# Patient Record
Sex: FEMALE | Race: OTHER | HISPANIC OR LATINO | Employment: FULL TIME | URBAN - METROPOLITAN AREA
[De-identification: names, ages, dates, MRNs, and addresses within clinical notes are randomized per-mention and may not be internally consistent; named-entity substitution may affect disease eponyms.]

---

## 2019-04-01 ENCOUNTER — APPOINTMENT (EMERGENCY)
Dept: RADIOLOGY | Facility: HOSPITAL | Age: 64
End: 2019-04-01
Payer: COMMERCIAL

## 2019-04-01 ENCOUNTER — HOSPITAL ENCOUNTER (EMERGENCY)
Facility: HOSPITAL | Age: 64
Discharge: HOME | End: 2019-04-01
Attending: EMERGENCY MEDICINE | Admitting: EMERGENCY MEDICINE
Payer: COMMERCIAL

## 2019-04-01 VITALS
TEMPERATURE: 100 F | WEIGHT: 178.57 LBS | HEIGHT: 60 IN | OXYGEN SATURATION: 96 % | BODY MASS INDEX: 35.06 KG/M2 | DIASTOLIC BLOOD PRESSURE: 81 MMHG | SYSTOLIC BLOOD PRESSURE: 197 MMHG | RESPIRATION RATE: 16 BRPM | HEART RATE: 100 BPM

## 2019-04-01 DIAGNOSIS — M75.31 CALCIFIC TENDONITIS OF RIGHT SHOULDER: Primary | ICD-10-CM

## 2019-04-01 PROCEDURE — 99283 EMERGENCY DEPT VISIT LOW MDM: CPT | Performed by: PHYSICIAN ASSISTANT

## 2019-04-01 PROCEDURE — 99283 EMERGENCY DEPT VISIT LOW MDM: CPT

## 2019-04-01 PROCEDURE — 93005 ELECTROCARDIOGRAM TRACING: CPT

## 2019-04-01 PROCEDURE — 73030 X-RAY EXAM OF SHOULDER: CPT

## 2019-04-01 PROCEDURE — 96374 THER/PROPH/DIAG INJ IV PUSH: CPT

## 2019-04-01 RX ORDER — NAPROXEN 500 MG/1
500 TABLET ORAL 2 TIMES DAILY WITH MEALS
Qty: 30 TABLET | Refills: 0 | Status: SHIPPED | OUTPATIENT
Start: 2019-04-01

## 2019-04-01 RX ORDER — KETOROLAC TROMETHAMINE 30 MG/ML
15 INJECTION, SOLUTION INTRAMUSCULAR; INTRAVENOUS ONCE
Status: COMPLETED | OUTPATIENT
Start: 2019-04-01 | End: 2019-04-01

## 2019-04-01 RX ADMIN — KETOROLAC TROMETHAMINE 15 MG: 30 INJECTION, SOLUTION INTRAMUSCULAR; INTRAVENOUS at 22:28

## 2019-04-02 LAB
ATRIAL RATE: 96 BPM
P AXIS: 58 DEGREES
PR INTERVAL: 182 MS
QRS AXIS: 41 DEGREES
QRSD INTERVAL: 80 MS
QT INTERVAL: 380 MS
QTC INTERVAL: 480 MS
T WAVE AXIS: 63 DEGREES
VENTRICULAR RATE: 96 BPM

## 2019-04-02 PROCEDURE — 93010 ELECTROCARDIOGRAM REPORT: CPT | Performed by: INTERNAL MEDICINE

## 2019-04-03 ENCOUNTER — OFFICE VISIT (OUTPATIENT)
Dept: OBGYN CLINIC | Facility: CLINIC | Age: 64
End: 2019-04-03
Payer: COMMERCIAL

## 2019-04-03 VITALS
HEIGHT: 60 IN | SYSTOLIC BLOOD PRESSURE: 141 MMHG | WEIGHT: 177 LBS | BODY MASS INDEX: 34.75 KG/M2 | DIASTOLIC BLOOD PRESSURE: 79 MMHG | HEART RATE: 96 BPM

## 2019-04-03 DIAGNOSIS — M75.81 ROTATOR CUFF TENDINITIS, RIGHT: Primary | ICD-10-CM

## 2019-04-03 DIAGNOSIS — M62.838 TRAPEZIUS MUSCLE SPASM: ICD-10-CM

## 2019-04-03 DIAGNOSIS — M75.21 BICEPS TENDINITIS OF RIGHT SHOULDER: ICD-10-CM

## 2019-04-03 PROCEDURE — 99243 OFF/OP CNSLTJ NEW/EST LOW 30: CPT | Performed by: FAMILY MEDICINE

## 2019-04-03 PROCEDURE — 20610 DRAIN/INJ JOINT/BURSA W/O US: CPT | Performed by: FAMILY MEDICINE

## 2019-04-03 RX ORDER — AZELASTINE HYDROCHLORIDE 0.5 MG/ML
1 SOLUTION/ DROPS OPHTHALMIC
COMMUNITY
Start: 2018-01-29

## 2019-04-03 RX ORDER — LANOLIN ALCOHOL/MO/W.PET/CERES
3 CREAM (GRAM) TOPICAL
COMMUNITY

## 2019-04-03 RX ORDER — ROSUVASTATIN CALCIUM 20 MG/1
TABLET, COATED ORAL
COMMUNITY
Start: 2019-02-13

## 2019-04-03 RX ORDER — LIDOCAINE HYDROCHLORIDE 10 MG/ML
2 INJECTION, SOLUTION INFILTRATION; PERINEURAL
Status: COMPLETED | OUTPATIENT
Start: 2019-04-03 | End: 2019-04-03

## 2019-04-03 RX ORDER — VALSARTAN AND HYDROCHLOROTHIAZIDE 160; 25 MG/1; MG/1
TABLET ORAL
COMMUNITY
Start: 2018-04-04

## 2019-04-03 RX ORDER — CYCLOBENZAPRINE HCL 10 MG
TABLET ORAL
COMMUNITY
Start: 2019-03-04

## 2019-04-03 RX ORDER — LIDOCAINE HYDROCHLORIDE 10 MG/ML
4 INJECTION, SOLUTION INFILTRATION; PERINEURAL
Status: COMPLETED | OUTPATIENT
Start: 2019-04-03 | End: 2019-04-03

## 2019-04-03 RX ORDER — TRIAMCINOLONE ACETONIDE 40 MG/ML
40 INJECTION, SUSPENSION INTRA-ARTICULAR; INTRAMUSCULAR
Status: COMPLETED | OUTPATIENT
Start: 2019-04-03 | End: 2019-04-03

## 2019-04-03 RX ADMIN — LIDOCAINE HYDROCHLORIDE 4 ML: 10 INJECTION, SOLUTION INFILTRATION; PERINEURAL at 10:46

## 2019-04-03 RX ADMIN — LIDOCAINE HYDROCHLORIDE 2 ML: 10 INJECTION, SOLUTION INFILTRATION; PERINEURAL at 10:46

## 2019-04-03 RX ADMIN — TRIAMCINOLONE ACETONIDE 40 MG: 40 INJECTION, SUSPENSION INTRA-ARTICULAR; INTRAMUSCULAR at 10:46

## 2023-04-25 ENCOUNTER — OFFICE VISIT (OUTPATIENT)
Dept: URGENT CARE | Facility: CLINIC | Age: 68
End: 2023-04-25

## 2023-04-25 VITALS
OXYGEN SATURATION: 99 % | BODY MASS INDEX: 34.16 KG/M2 | TEMPERATURE: 98 F | HEART RATE: 100 BPM | HEIGHT: 60 IN | RESPIRATION RATE: 18 BRPM | WEIGHT: 174 LBS

## 2023-04-25 DIAGNOSIS — B35.6 TINEA CRURIS: Primary | ICD-10-CM

## 2023-04-25 DIAGNOSIS — B37.9 CANDIDA ALBICANS INFECTION: ICD-10-CM

## 2023-04-25 DIAGNOSIS — R82.90 MALODOROUS URINE: ICD-10-CM

## 2023-04-25 PROBLEM — M54.2 NECK PAIN: Status: ACTIVE | Noted: 2017-02-01

## 2023-04-25 PROBLEM — I10 HYPERTENSION GOAL BP (BLOOD PRESSURE) < 130/80: Status: ACTIVE | Noted: 2022-02-14

## 2023-04-25 PROBLEM — I10 BENIGN ESSENTIAL HYPERTENSION: Status: ACTIVE | Noted: 2018-06-15

## 2023-04-25 PROBLEM — Z79.4 LONG TERM CURRENT USE OF INSULIN (HCC): Status: ACTIVE | Noted: 2018-06-15

## 2023-04-25 PROBLEM — M48.10 ANKYLOSING HYPEROSTOSIS: Status: ACTIVE | Noted: 2018-06-15

## 2023-04-25 PROBLEM — Z59.41 FOOD INSECURITY: Status: ACTIVE | Noted: 2021-08-09

## 2023-04-25 PROBLEM — M10.9 GOUT: Status: ACTIVE | Noted: 2018-06-15

## 2023-04-25 PROBLEM — M25.519 SHOULDER PAIN: Status: ACTIVE | Noted: 2017-01-19

## 2023-04-25 PROBLEM — M75.40 IMPINGEMENT SYNDROME OF SHOULDER REGION: Status: ACTIVE | Noted: 2017-02-01

## 2023-04-25 PROBLEM — F33.1 MODERATE EPISODE OF RECURRENT MAJOR DEPRESSIVE DISORDER (HCC): Status: ACTIVE | Noted: 2023-02-17

## 2023-04-25 PROBLEM — E78.2 MIXED HYPERLIPIDEMIA: Status: ACTIVE | Noted: 2020-10-12

## 2023-04-25 PROBLEM — F32.1 CURRENT MODERATE EPISODE OF MAJOR DEPRESSIVE DISORDER (HCC): Status: ACTIVE | Noted: 2021-05-25

## 2023-04-25 LAB
SL AMB  POCT GLUCOSE, UA: 2000
SL AMB LEUKOCYTE ESTERASE,UA: NEGATIVE
SL AMB POCT BILIRUBIN,UA: NEGATIVE
SL AMB POCT BLOOD,UA: NEGATIVE
SL AMB POCT CLARITY,UA: NORMAL
SL AMB POCT COLOR,UA: YELLOW
SL AMB POCT KETONES,UA: 15
SL AMB POCT NITRITE,UA: POSITIVE
SL AMB POCT PH,UA: 5
SL AMB POCT SPECIFIC GRAVITY,UA: 1.01
SL AMB POCT URINE PROTEIN: NEGATIVE
SL AMB POCT UROBILINOGEN: 1

## 2023-04-25 RX ORDER — FLUCONAZOLE 150 MG/1
TABLET ORAL
COMMUNITY
Start: 2023-02-27 | End: 2023-04-25 | Stop reason: ALTCHOICE

## 2023-04-25 RX ORDER — MONTELUKAST SODIUM 10 MG/1
10 TABLET ORAL DAILY
COMMUNITY
Start: 2023-02-17

## 2023-04-25 RX ORDER — FLUTICASONE PROPIONATE 50 MCG
2 SPRAY, SUSPENSION (ML) NASAL DAILY
COMMUNITY
Start: 2023-02-17

## 2023-04-25 RX ORDER — ESCITALOPRAM OXALATE 20 MG/1
1 TABLET ORAL DAILY
COMMUNITY
Start: 2023-04-24

## 2023-04-25 RX ORDER — LOTEPREDNOL ETABONATE 5 MG/ML
SUSPENSION/ DROPS OPHTHALMIC
COMMUNITY
Start: 2023-02-22

## 2023-04-25 RX ORDER — ATORVASTATIN CALCIUM 40 MG/1
40 TABLET, FILM COATED ORAL DAILY
COMMUNITY
Start: 2023-02-17

## 2023-04-25 RX ORDER — NEOMYCIN SULFATE, POLYMYXIN B SULFATE AND DEXAMETHASONE 3.5; 10000; 1 MG/ML; [USP'U]/ML; MG/ML
SUSPENSION/ DROPS OPHTHALMIC
COMMUNITY
Start: 2023-01-30

## 2023-04-25 RX ORDER — ALBUTEROL SULFATE 90 UG/1
2 AEROSOL, METERED RESPIRATORY (INHALATION) 4 TIMES DAILY
COMMUNITY
Start: 2023-04-10

## 2023-04-25 RX ORDER — LIFITEGRAST 50 MG/ML
SOLUTION/ DROPS OPHTHALMIC
COMMUNITY
Start: 2023-02-13

## 2023-04-25 RX ORDER — FLUCONAZOLE 150 MG/1
150 TABLET ORAL ONCE
Qty: 1 TABLET | Refills: 0 | Status: SHIPPED | OUTPATIENT
Start: 2023-04-25 | End: 2023-04-25

## 2023-04-25 RX ORDER — EMPAGLIFLOZIN AND METFORMIN HYDROCHLORIDE 5; 500 MG/1; MG/1
1 TABLET ORAL 2 TIMES DAILY
COMMUNITY
Start: 2022-11-07

## 2023-04-25 RX ORDER — ESTRADIOL 0.1 MG/G
2 CREAM VAGINAL DAILY
COMMUNITY
Start: 2023-03-24 | End: 2024-03-23

## 2023-04-25 RX ORDER — NYSTATIN 100000 [USP'U]/G
POWDER TOPICAL 2 TIMES DAILY
Qty: 30 G | Refills: 0 | Status: SHIPPED | OUTPATIENT
Start: 2023-04-25

## 2023-04-25 NOTE — PROGRESS NOTES
[address] [facility]        NAME: Emily Kirk is a 79 y o  female  : 1955    MRN: [id number]  DATE: 2023  TIME: 8:13 AM    Assessment and Plan   Tinea cruris [B35 6]  1  Tinea cruris  nystatin (MYCOSTATIN) powder    nystatin-triamcinolone (MYCOLOG-II) cream      2  Malodorous urine  POCT urine dip    Urine culture      3  Candida albicans infection  fluconazole (DIFLUCAN) 150 mg tablet        POC urine negative for UTI, but large amounts of glucose in urine  Patient does have a prior history of diabetes, managed by her PCP  Will send urine culture and start on Diflucan for presumed yeast infection  Will follow-up with urine culture results if positive  Patient Instructions     Apply cream under breasts and powder between groin folds up to twice daily  Take diflucan once for symptoms of yeast infection, will follow-up with urine culture if results abnormal  Follow-up with PCP in 3-5 days if no improvement of symptoms  Report to ER if symptoms worsen  Chief Complaint     Chief Complaint   Patient presents with   • Rash     4 days rash on chest and privatre area and a severe headache  History of Present Illness       79year old female presents for evaluation of rash under bilateral breasts and between groin folds for the past 3 days  Reports recently changing her detergent and noticing the rash develop shortly after  She denies prior allergies to soaps or detergents  She also reports developing malodorous urine, burning with urination, and a white discharge in the past few days  Denies risk for STDs, she is post-menopausal  She has not yet taken anything for symptoms  Rash  This is a new problem  The current episode started in the past 7 days  The problem is unchanged  The affected locations include the chest and groin  The problem is mild  The rash is characterized by dryness, itchiness and redness  It is unknown if there was an exposure to a precipitant   The rash first occurred at home  Associated symptoms include joint pain  Pertinent negatives include no anorexia, congestion, cough, decreased physical activity, decreased responsiveness, decreased sleep, drinking less, diarrhea, facial edema, fatigue, fever, itching, rhinorrhea, shortness of breath, sore throat or vomiting  Past treatments include nothing  The treatment provided no relief  There is no history of allergies, asthma, eczema or varicella  There were no sick contacts  Review of Systems   Review of Systems   Constitutional: Negative for activity change, appetite change, chills, decreased responsiveness, fatigue and fever  HENT: Negative for congestion, rhinorrhea and sore throat  Respiratory: Negative for cough and shortness of breath  Gastrointestinal: Negative for abdominal pain, anorexia, constipation, diarrhea, nausea and vomiting  Genitourinary: Positive for dysuria and vaginal discharge  Negative for decreased urine volume, urgency, vaginal bleeding and vaginal pain  Musculoskeletal: Positive for joint pain  Skin: Positive for color change and rash  Negative for itching, pallor and wound  Allergic/Immunologic: Negative for environmental allergies and food allergies  Neurological: Negative for dizziness, light-headedness, numbness and headaches           Current Medications       Current Outpatient Medications:   •  cyanocobalamin (VITAMIN B-12) 1000 MCG tablet, TAKE 1 TABLET BY MOUTH EVERY DAY, Disp: , Rfl:   •  Empagliflozin-metFORMIN HCl (Synjardy) 5-500 MG TABS, Take 1 tablet by mouth 2 (two) times a day, Disp: , Rfl:   •  lifitegrast (Xiidra) 5 % op solution, INSTILL 1 DROP INTO BOTH EYES TWICE A DAY, Disp: , Rfl:   •  melatonin 3 mg, Take 3 mg by mouth, Disp: , Rfl:   •  montelukast (SINGULAIR) 10 mg tablet, Take 10 mg by mouth daily, Disp: , Rfl:   •  naproxen (NAPROSYN) 500 mg tablet, Take 1 tablet (500 mg total) by mouth 2 (two) times a day with meals, Disp: 30 tablet, Rfl: 0  • nystatin (MYCOSTATIN) powder, Apply topically 2 (two) times a day, Disp: 30 g, Rfl: 0  •  nystatin-triamcinolone (MYCOLOG-II) cream, Apply topically 2 (two) times a day, Disp: 30 g, Rfl: 0  •  semaglutide, 1 mg/dose, (Ozempic) 4 mg/3 mL injection pen, Inject 1 mg under the skin, Disp: , Rfl:   •  albuterol (PROVENTIL HFA,VENTOLIN HFA) 90 mcg/act inhaler, Inhale 2 puffs 4 (four) times a day (Patient not taking: Reported on 4/25/2023), Disp: , Rfl:   •  atorvastatin (LIPITOR) 40 mg tablet, Take 40 mg by mouth daily (Patient not taking: Reported on 4/25/2023), Disp: , Rfl:   •  azelastine (OPTIVAR) 0 05 % ophthalmic solution, Apply 1 drop to eye (Patient not taking: Reported on 4/25/2023), Disp: , Rfl:   •  cyclobenzaprine (FLEXERIL) 10 mg tablet, TAKE 1 TABLET BY MOUTH AT BEDTIME AS NEEDED FOR MUSCLE SPASM (Patient not taking: Reported on 4/25/2023), Disp: , Rfl:   •  escitalopram (LEXAPRO) 20 mg tablet, Take 1 tablet by mouth daily (Patient not taking: Reported on 4/25/2023), Disp: , Rfl:   •  estradiol (ESTRACE) 0 1 mg/g vaginal cream, Insert 2 g into the vagina daily (Patient not taking: Reported on 4/25/2023), Disp: , Rfl:   •  fluticasone (FLONASE) 50 mcg/act nasal spray, 2 sprays into each nostril daily (Patient not taking: Reported on 4/25/2023), Disp: , Rfl:   •  glyBURIDE (DIABETA PO), glyburide (Patient not taking: Reported on 4/25/2023), Disp: , Rfl:   •  loteprednol etabonate (LOTEMAX) 0 5 % ophthalmic suspension, INSTILL 1 DROP INTO BOTH EYES 4 TIMES A DAY FOR 1 WEEK THEN TWICE A DAY FOR 1 WEEK (Patient not taking: Reported on 4/25/2023), Disp: , Rfl:   •  neomycin-polymyxin-dexamethasone (MAXITROL) ophthalmic suspension, INSTILL 1 DROP INTO BOTH EYES 4 TIMES A DAY FOR 1 WEEK (Patient not taking: Reported on 4/25/2023), Disp: , Rfl:   •  rosuvastatin (CRESTOR) 20 MG tablet, TAKE 1 TABLET (20 MG) BY MOUTH DAILY (Patient not taking: Reported on 4/25/2023), Disp: , Rfl:   •  valsartan-hydrochlorothiazide (DIOVAN-HCT) 160-25 MG per tablet, TAKE 1 TAB BY MOUTH DAILY  (Patient not taking: Reported on 2023), Disp: , Rfl:     Current Allergies     Allergies as of 2023   • (No Known Allergies)            The following portions of the patient's history were reviewed and updated as appropriate: allergies, current medications, past family history, past medical history, past social history, past surgical history and problem list      Past Medical History:   Diagnosis Date   • Diabetes mellitus ([facility] [address] )    • Hyperlipidemia    • Hypertension        Past Surgical History:   Procedure Laterality Date   •  SECTION     • HYSTERECTOMY     • TONSILLECTOMY         History reviewed  No pertinent family history  Medications have been verified  Objective   Pulse 100   Temp 98 °F (36 7 °C)   Resp 18   Ht 5' (1 524 m)   Wt 78 9 kg (174 lb)   SpO2 99%   BMI 33 98 kg/m²        Physical Exam     Physical Exam  Vitals and nursing note reviewed  Constitutional:       General: She is awake  Appearance: Normal appearance  She is overweight  HENT:      Head: Normocephalic and atraumatic  Cardiovascular:      Rate and Rhythm: Regular rhythm  Tachycardia present  Pulses: Normal pulses  Heart sounds: Normal heart sounds  Pulmonary:      Effort: Pulmonary effort is normal       Breath sounds: Normal breath sounds  Skin:     General: Skin is warm and dry  Findings: Erythema and rash present  No abrasion, abscess, acne, bruising, burn, ecchymosis, signs of injury, laceration, lesion, petechiae or wound  Rash is macular  Rash is not crusting, nodular, papular, purpuric, pustular, scaling, urticarial or vesicular  Neurological:      Mental Status: She is alert and oriented to person, place, and time  Psychiatric:         Mood and Affect: Mood normal          Behavior: Behavior normal  Behavior is cooperative  Thought Content:  Thought content normal          Judgment: Judgment normal

## 2023-04-25 NOTE — PATIENT INSTRUCTIONS
Apply cream under breasts and powder between groin folds up to twice daily  Take diflucan once for symptoms of yeast infection, will follow-up with urine culture if results abnormal  Follow-up with PCP in 3-5 days if no improvement of symptoms  Report to ER if symptoms worsen

## 2023-04-27 ENCOUNTER — TELEPHONE (OUTPATIENT)
Age: 68
End: 2023-04-27

## 2023-04-27 DIAGNOSIS — N30.00 ACUTE CYSTITIS WITHOUT HEMATURIA: Primary | ICD-10-CM

## 2023-04-27 LAB — BACTERIA UR CULT: ABNORMAL

## 2023-04-27 RX ORDER — CEPHALEXIN 500 MG/1
500 CAPSULE ORAL EVERY 8 HOURS SCHEDULED
Qty: 21 CAPSULE | Refills: 0 | Status: SHIPPED | OUTPATIENT
Start: 2023-04-27 | End: 2023-05-04

## 2023-12-08 ENCOUNTER — HOSPITAL ENCOUNTER (OUTPATIENT)
Facility: HOSPITAL | Age: 68
Setting detail: OBSERVATION
Discharge: HOME | End: 2023-12-09
Attending: EMERGENCY MEDICINE | Admitting: INTERNAL MEDICINE
Payer: COMMERCIAL

## 2023-12-08 ENCOUNTER — APPOINTMENT (OUTPATIENT)
Dept: NON INVASIVE DIAGNOSTICS | Facility: HOSPITAL | Age: 68
End: 2023-12-08
Payer: COMMERCIAL

## 2023-12-08 ENCOUNTER — APPOINTMENT (EMERGENCY)
Dept: CT IMAGING | Facility: HOSPITAL | Age: 68
End: 2023-12-08
Payer: COMMERCIAL

## 2023-12-08 ENCOUNTER — APPOINTMENT (OUTPATIENT)
Dept: MRI IMAGING | Facility: HOSPITAL | Age: 68
End: 2023-12-08
Payer: COMMERCIAL

## 2023-12-08 DIAGNOSIS — R20.0 NUMBNESS: Primary | ICD-10-CM

## 2023-12-08 DIAGNOSIS — E78.2 MIXED HYPERLIPIDEMIA: ICD-10-CM

## 2023-12-08 DIAGNOSIS — I10 HYPERTENSION GOAL BP (BLOOD PRESSURE) < 130/80: ICD-10-CM

## 2023-12-08 DIAGNOSIS — E11.36 TYPE 2 DIABETES MELLITUS WITH DIABETIC CATARACT (HCC): ICD-10-CM

## 2023-12-08 DIAGNOSIS — I65.23 CAROTID STENOSIS, BILATERAL: ICD-10-CM

## 2023-12-08 DIAGNOSIS — M10.9 GOUT: ICD-10-CM

## 2023-12-08 PROBLEM — R29.90 STROKE-LIKE SYMPTOMS: Status: ACTIVE | Noted: 2023-12-08

## 2023-12-08 LAB
2HR DELTA HS TROPONIN: -1 NG/L
4HR DELTA HS TROPONIN: -4 NG/L
ANION GAP SERPL CALCULATED.3IONS-SCNC: 9 MMOL/L
AORTIC ROOT: 2.2 CM
APICAL FOUR CHAMBER EJECTION FRACTION: 50 %
APTT PPP: 26 SECONDS (ref 23–37)
ASCENDING AORTA: 1.9 CM
ATRIAL RATE: 85 BPM
BUN SERPL-MCNC: 11 MG/DL (ref 5–25)
CALCIUM SERPL-MCNC: 9.4 MG/DL (ref 8.4–10.2)
CARDIAC TROPONIN I PNL SERPL HS: 4 NG/L
CARDIAC TROPONIN I PNL SERPL HS: 7 NG/L
CARDIAC TROPONIN I PNL SERPL HS: 8 NG/L
CHLORIDE SERPL-SCNC: 104 MMOL/L (ref 96–108)
CO2 SERPL-SCNC: 26 MMOL/L (ref 21–32)
CREAT SERPL-MCNC: 0.83 MG/DL (ref 0.6–1.3)
E WAVE DECELERATION TIME: 197 MS
E/A RATIO: 0.62
ERYTHROCYTE [DISTWIDTH] IN BLOOD BY AUTOMATED COUNT: 14.1 % (ref 11.6–15.1)
FLUAV RNA RESP QL NAA+PROBE: NEGATIVE
FLUBV RNA RESP QL NAA+PROBE: NEGATIVE
FRACTIONAL SHORTENING: 27 (ref 28–44)
GFR SERPL CREATININE-BSD FRML MDRD: 72 ML/MIN/1.73SQ M
GLUCOSE SERPL-MCNC: 127 MG/DL (ref 65–140)
GLUCOSE SERPL-MCNC: 161 MG/DL (ref 65–140)
GLUCOSE SERPL-MCNC: 178 MG/DL (ref 65–140)
GLUCOSE SERPL-MCNC: 184 MG/DL (ref 65–140)
HCT VFR BLD AUTO: 45.8 % (ref 34.8–46.1)
HGB BLD-MCNC: 14 G/DL (ref 11.5–15.4)
INR PPP: 0.96 (ref 0.84–1.19)
INTERVENTRICULAR SEPTUM IN DIASTOLE (PARASTERNAL SHORT AXIS VIEW): 0.9 CM
INTERVENTRICULAR SEPTUM: 0.9 CM (ref 0.6–1.1)
LA/AORTA RATIO 2D: 1.41
LAAS-AP2: 13.5 CM2
LAAS-AP4: 17.9 CM2
LEFT ATRIUM SIZE: 3.1 CM
LEFT ATRIUM VOLUME (MOD BIPLANE): 38 ML
LEFT ATRIUM VOLUME INDEX (MOD BIPLANE): 21.6 ML/M2
LEFT INTERNAL DIMENSION IN SYSTOLE: 3.3 CM (ref 2.1–4)
LEFT VENTRICULAR INTERNAL DIMENSION IN DIASTOLE: 4.5 CM (ref 3.5–6)
LEFT VENTRICULAR POSTERIOR WALL IN END DIASTOLE: 0.9 CM
LEFT VENTRICULAR STROKE VOLUME: 46 ML
LVSV (TEICH): 46 ML
MCH RBC QN AUTO: 25.4 PG (ref 26.8–34.3)
MCHC RBC AUTO-ENTMCNC: 30.6 G/DL (ref 31.4–37.4)
MCV RBC AUTO: 83 FL (ref 82–98)
MV E'TISSUE VEL-SEP: 8 CM/S
MV PEAK A VEL: 1.38 M/S
MV PEAK E VEL: 85 CM/S
MV STENOSIS PRESSURE HALF TIME: 58 MS
MV VALVE AREA P 1/2 METHOD: 3.79
P AXIS: 57 DEGREES
PLATELET # BLD AUTO: 353 THOUSANDS/UL (ref 149–390)
PMV BLD AUTO: 9.8 FL (ref 8.9–12.7)
POTASSIUM SERPL-SCNC: 4.3 MMOL/L (ref 3.5–5.3)
PR INTERVAL: 190 MS
PROTHROMBIN TIME: 13.3 SECONDS (ref 11.6–14.5)
QRS AXIS: 102 DEGREES
QRSD INTERVAL: 76 MS
QT INTERVAL: 388 MS
QTC INTERVAL: 461 MS
RBC # BLD AUTO: 5.52 MILLION/UL (ref 3.81–5.12)
RIGHT VENTRICLE ID DIMENSION: 3.3 CM
RSV RNA RESP QL NAA+PROBE: NEGATIVE
SARS-COV-2 RNA RESP QL NAA+PROBE: NEGATIVE
SL CV LV EF: 45
SL CV PED ECHO LEFT VENTRICLE DIASTOLIC VOLUME (MOD BIPLANE) 2D: 91 ML
SL CV PED ECHO LEFT VENTRICLE SYSTOLIC VOLUME (MOD BIPLANE) 2D: 45 ML
SODIUM SERPL-SCNC: 139 MMOL/L (ref 135–147)
T WAVE AXIS: 57 DEGREES
TRICUSPID ANNULAR PLANE SYSTOLIC EXCURSION: 2 CM
VENTRICULAR RATE: 85 BPM
WBC # BLD AUTO: 6.82 THOUSAND/UL (ref 4.31–10.16)

## 2023-12-08 PROCEDURE — 70496 CT ANGIOGRAPHY HEAD: CPT

## 2023-12-08 PROCEDURE — 99285 EMERGENCY DEPT VISIT HI MDM: CPT

## 2023-12-08 PROCEDURE — 82948 REAGENT STRIP/BLOOD GLUCOSE: CPT

## 2023-12-08 PROCEDURE — 85027 COMPLETE CBC AUTOMATED: CPT | Performed by: NURSE PRACTITIONER

## 2023-12-08 PROCEDURE — 85610 PROTHROMBIN TIME: CPT | Performed by: NURSE PRACTITIONER

## 2023-12-08 PROCEDURE — 93306 TTE W/DOPPLER COMPLETE: CPT | Performed by: INTERNAL MEDICINE

## 2023-12-08 PROCEDURE — 84484 ASSAY OF TROPONIN QUANT: CPT | Performed by: NURSE PRACTITIONER

## 2023-12-08 PROCEDURE — 36415 COLL VENOUS BLD VENIPUNCTURE: CPT | Performed by: NURSE PRACTITIONER

## 2023-12-08 PROCEDURE — 70498 CT ANGIOGRAPHY NECK: CPT

## 2023-12-08 PROCEDURE — C8929 TTE W OR WO FOL WCON,DOPPLER: HCPCS

## 2023-12-08 PROCEDURE — 80048 BASIC METABOLIC PNL TOTAL CA: CPT | Performed by: NURSE PRACTITIONER

## 2023-12-08 PROCEDURE — 0241U HB NFCT DS VIR RESP RNA 4 TRGT: CPT | Performed by: NURSE PRACTITIONER

## 2023-12-08 PROCEDURE — 99291 CRITICAL CARE FIRST HOUR: CPT | Performed by: STUDENT IN AN ORGANIZED HEALTH CARE EDUCATION/TRAINING PROGRAM

## 2023-12-08 PROCEDURE — 84484 ASSAY OF TROPONIN QUANT: CPT | Performed by: INTERNAL MEDICINE

## 2023-12-08 PROCEDURE — 99223 1ST HOSP IP/OBS HIGH 75: CPT | Performed by: INTERNAL MEDICINE

## 2023-12-08 PROCEDURE — 93005 ELECTROCARDIOGRAM TRACING: CPT

## 2023-12-08 PROCEDURE — 85730 THROMBOPLASTIN TIME PARTIAL: CPT | Performed by: NURSE PRACTITIONER

## 2023-12-08 PROCEDURE — 99285 EMERGENCY DEPT VISIT HI MDM: CPT | Performed by: NURSE PRACTITIONER

## 2023-12-08 RX ORDER — HYDROXYZINE HYDROCHLORIDE 25 MG/1
50 TABLET, FILM COATED ORAL
Status: DISCONTINUED | OUTPATIENT
Start: 2023-12-08 | End: 2023-12-09 | Stop reason: HOSPADM

## 2023-12-08 RX ORDER — INSULIN LISPRO 100 [IU]/ML
1-5 INJECTION, SOLUTION INTRAVENOUS; SUBCUTANEOUS
Status: DISCONTINUED | OUTPATIENT
Start: 2023-12-08 | End: 2023-12-09 | Stop reason: HOSPADM

## 2023-12-08 RX ORDER — MAGNESIUM HYDROXIDE/ALUMINUM HYDROXICE/SIMETHICONE 120; 1200; 1200 MG/30ML; MG/30ML; MG/30ML
30 SUSPENSION ORAL EVERY 6 HOURS PRN
Status: DISCONTINUED | OUTPATIENT
Start: 2023-12-08 | End: 2023-12-09 | Stop reason: HOSPADM

## 2023-12-08 RX ORDER — ASPIRIN 81 MG/1
81 TABLET, CHEWABLE ORAL DAILY
Status: DISCONTINUED | OUTPATIENT
Start: 2023-12-09 | End: 2023-12-09 | Stop reason: HOSPADM

## 2023-12-08 RX ORDER — ATORVASTATIN CALCIUM 40 MG/1
40 TABLET, FILM COATED ORAL EVERY EVENING
Status: DISCONTINUED | OUTPATIENT
Start: 2023-12-08 | End: 2023-12-09 | Stop reason: HOSPADM

## 2023-12-08 RX ORDER — ONDANSETRON 2 MG/ML
4 INJECTION INTRAMUSCULAR; INTRAVENOUS EVERY 6 HOURS PRN
Status: DISCONTINUED | OUTPATIENT
Start: 2023-12-08 | End: 2023-12-09 | Stop reason: HOSPADM

## 2023-12-08 RX ORDER — ENOXAPARIN SODIUM 100 MG/ML
40 INJECTION SUBCUTANEOUS DAILY
Status: DISCONTINUED | OUTPATIENT
Start: 2023-12-08 | End: 2023-12-09 | Stop reason: HOSPADM

## 2023-12-08 RX ORDER — CLOPIDOGREL BISULFATE 75 MG/1
75 TABLET ORAL DAILY
Status: DISCONTINUED | OUTPATIENT
Start: 2023-12-09 | End: 2023-12-09

## 2023-12-08 RX ORDER — ACETAMINOPHEN 325 MG/1
650 TABLET ORAL EVERY 6 HOURS PRN
Status: DISCONTINUED | OUTPATIENT
Start: 2023-12-08 | End: 2023-12-09 | Stop reason: HOSPADM

## 2023-12-08 RX ORDER — CLOPIDOGREL BISULFATE 75 MG/1
300 TABLET ORAL ONCE
Status: COMPLETED | OUTPATIENT
Start: 2023-12-08 | End: 2023-12-08

## 2023-12-08 RX ORDER — ASPIRIN 325 MG
325 TABLET ORAL ONCE
Status: COMPLETED | OUTPATIENT
Start: 2023-12-08 | End: 2023-12-08

## 2023-12-08 RX ORDER — HYDROXYZINE 50 MG/1
50 TABLET, FILM COATED ORAL
COMMUNITY
Start: 2023-10-20

## 2023-12-08 RX ADMIN — ENOXAPARIN SODIUM 40 MG: 40 INJECTION SUBCUTANEOUS at 13:26

## 2023-12-08 RX ADMIN — CLOPIDOGREL 300 MG: 75 TABLET ORAL at 10:57

## 2023-12-08 RX ADMIN — INSULIN LISPRO 1 UNITS: 100 INJECTION, SOLUTION INTRAVENOUS; SUBCUTANEOUS at 22:35

## 2023-12-08 RX ADMIN — PERFLUTREN 2 ML/MIN: 6.52 INJECTION, SUSPENSION INTRAVENOUS at 15:53

## 2023-12-08 RX ADMIN — ASPIRIN 325 MG ORAL TABLET 325 MG: 325 PILL ORAL at 10:57

## 2023-12-08 RX ADMIN — IOHEXOL 85 ML: 350 INJECTION, SOLUTION INTRAVENOUS at 09:59

## 2023-12-08 RX ADMIN — ATORVASTATIN CALCIUM 40 MG: 40 TABLET, FILM COATED ORAL at 17:23

## 2023-12-08 RX ADMIN — HYDROXYZINE HYDROCHLORIDE 50 MG: 25 TABLET, FILM COATED ORAL at 22:36

## 2023-12-08 RX ADMIN — CYANOCOBALAMIN TAB 500 MCG 1000 MCG: 500 TAB at 13:26

## 2023-12-08 NOTE — ED NOTES
Waiting a bed assignment pt aware none yet   Caro Porter, RN  12/08/23 [address]  12/08/23 [id number]

## 2023-12-08 NOTE — ASSESSMENT & PLAN NOTE
This is a 71-year-old female with significant history of hypertension, diabetes and dyslipidemia came in with significant history of pins and needle sensation on the left side of the face when she got up this morning around 8 AM.  The numbness in her legs and left hand seem better however she still continues to have mild numbness in the left upper part of her hand and on the left side of the face  Initially had some slurring of speech for about a minute but this resolved totally  No significant motor weakness according to the patient  Hospitalized in observation status with NIH stroke scale of 1  CT head and CT angiogram are negative for any acute infarct  Stroke pathway initiated and the patient is on dual antiplatelet therapy for 21 days, with plan for monotherapy after that and high intensity statins

## 2023-12-08 NOTE — ASSESSMENT & PLAN NOTE
Lab Results   Component Value Date    HGBA1C 7.2 (H) 02/13/2023       Recent Labs     12/08/23  0944   POCGLU 178*       Blood Sugar Average: Last 72 hrs:  (P) 178    Hold oral medications and GLP-1 analog injections, will give insulin sliding scale while hospitalized

## 2023-12-08 NOTE — ASSESSMENT & PLAN NOTE
Patient was on valsartan hydrochlorothiazide at home  Given concern for acute stroke, permissive hypertension being allowed and will hold off oral antihypertensive medications at this point of time  However if the symptoms resolve and the MRI is negative, will need to reinitiate antihypertensive medications on 12/9/2023    Blood pressure (!) 177/93, pulse 99, temperature 98.4 °F (36.9 °C), temperature source Oral, resp. rate 20, SpO2 98 %.

## 2023-12-08 NOTE — H&P
[address]  H&P  Name: Chirag Doyle 76 y.o. female I MRN: [id number]  Unit/Bed#: ED 29 I Date of Admission: 12/8/2023   Date of Service: 12/8/2023 I Hospital Day: 0      Assessment/Plan   * Stroke-like symptoms  Assessment & Plan  This is a 61-year-old female with significant history of hypertension, diabetes and dyslipidemia came in with significant history of pins and needle sensation on the left side of the face when she got up this morning around 8 AM.  The numbness in her legs and left hand seem better however she still continues to have mild numbness in the left upper part of her hand and on the left side of the face  Initially had some slurring of speech for about a minute but this resolved totally  No significant motor weakness according to the patient  Hospitalized in observation status with NIH stroke scale of 1  CT head and CT angiogram are negative for any acute infarct  Stroke pathway initiated and the patient is on dual antiplatelet therapy for 21 days, with plan for monotherapy after that and high intensity statins    Benign essential hypertension  Assessment & Plan  Patient was on valsartan hydrochlorothiazide at home  Given concern for acute stroke, permissive hypertension being allowed and will hold off oral antihypertensive medications at this point of time  However if the symptoms resolve and the MRI is negative, will need to reinitiate antihypertensive medications on 12/9/2023    Blood pressure (!) 177/93, pulse 99, temperature 98.4 °F (36.9 °C), temperature source Oral, resp. rate 20, SpO2 98 %.       Type 2 diabetes mellitus with diabetic cataract [facility])  Assessment & Plan  Lab Results   Component Value Date    HGBA1C 7.2 (H) 02/13/2023       Recent Labs     12/08/23  0944   POCGLU 178*       Blood Sugar Average: Last 72 hrs:  (P) 178    Hold oral medications and GLP-1 analog injections, will give insulin sliding scale while hospitalized    Gout  Assessment & Plan  Patient does have a history of gout and arthritis but patient states that she is on no medications for the same and she feels quite okay  Will give Tylenol as needed             VTE Prophylaxis: Enoxaparin (Lovenox)  Code Status: Level 1 - Full Code  Anticipated Length of Stay:  Patient will be admitted on an Observation basis with an anticipated length of stay of  less than 2 midnights. Justification for Hospital Stay: Stroke-like symptoms  Total Time for Visit, including Counseling / Coordination of Care: 75 mins. Greater than 50% of this total time spent on direct patient counseling and coordination of care. Chief Complaint:     Chief Complaint   Patient presents with    Numbness     Pt c/o pins and needles feeling on her left arm which started this morning, pt also c/o lightheadedness      History of Present Illness:    Hardeep Michelle is a 76 y.o. female who presents with with left-sided numbness. Patient states that she got up from sleep at 8 AM this morning and the left side of her face left hand and left leg felt numb. She has past medical history of hypertension, diabetes and dyslipidemia and has not been taking her medications properly. Patient states that over time the numbness in the leg got better and the hands decreased but is still present but the face numbness persists. She did have slurring of speech for 1 minute. She had no motor deficits. Patient has history of noncompliance with her medication regimen. Patient does not have any chest pain, palpitations or diaphoresis.   No fever or chills at this point of time    Review of Systems:  Negative except as above  History obtained from the patient  General ROS: positive for  - fatigue  Psychological ROS: negative  Ophthalmic ROS: negative  Respiratory ROS: no cough, shortness of breath, or wheezing  Cardiovascular ROS: no chest pain or dyspnea on exertion  Gastrointestinal ROS: no abdominal pain, change in bowel habits, or black or bloody stools  Genito-Urinary ROS: no dysuria, trouble voiding, or hematuria  Musculoskeletal ROS: negative  Neurological ROS: positive for -left hemisensory symptoms  Hematological ROS- No active bleeding  Otherwise, all other 12 point review of systems normal.        Past Medical and Surgical History:   Past Medical History:   Diagnosis Date    Diabetes mellitus ([address])     Hyperlipidemia     Hypertension      Past Surgical History:   Procedure Laterality Date     SECTION      HYSTERECTOMY      TONSILLECTOMY       Meds/Allergies: Allergies: No Known Allergies  Prior to Admission Medications   Prescriptions Last Dose Informant Patient Reported? Taking?    Empagliflozin-metFORMIN HCl (Synjardy) 5-500 MG TABS   Yes No   Sig: Take 1 tablet by mouth 2 (two) times a day   albuterol (PROVENTIL HFA,VENTOLIN HFA) 90 mcg/act inhaler   Yes No   Sig: Inhale 2 puffs 4 (four) times a day   Patient not taking: Reported on 2023   atorvastatin (LIPITOR) 40 mg tablet   Yes No   Sig: Take 40 mg by mouth daily   Patient not taking: Reported on 2023   azelastine (OPTIVAR) 0.05 % ophthalmic solution  Self Yes No   Sig: Apply 1 drop to eye   Patient not taking: Reported on 2023   cyanocobalamin (VITAMIN B-12) 1000 MCG tablet  Self Yes No   Sig: TAKE 1 TABLET BY MOUTH EVERY DAY   cyclobenzaprine (FLEXERIL) 10 mg tablet  Self Yes No   Sig: TAKE 1 TABLET BY MOUTH AT BEDTIME AS NEEDED FOR MUSCLE SPASM   Patient not taking: Reported on 2023   escitalopram (LEXAPRO) 20 mg tablet   Yes No   Sig: Take 1 tablet by mouth daily   Patient not taking: Reported on 2023   estradiol (ESTRACE) 0.1 mg/g vaginal cream   Yes No   Sig: Insert 2 g into the vagina daily   Patient not taking: Reported on 2023   fluticasone (FLONASE) 50 mcg/act nasal spray   Yes No   Si sprays into each nostril daily   Patient not taking: Reported on 2023   glyBURIDE (DIABETA PO)   Yes No   Sig: glyburide   Patient not taking: Reported on 4/25/2023   lifitegrast (Xiidra) 5 % op solution   Yes No   Sig: INSTILL 1 DROP INTO BOTH EYES TWICE A DAY   loteprednol etabonate (LOTEMAX) 0.5 % ophthalmic suspension   Yes No   Sig: INSTILL 1 DROP INTO BOTH EYES 4 TIMES A DAY FOR 1 WEEK THEN TWICE A DAY FOR 1 WEEK   Patient not taking: Reported on 4/25/2023   melatonin 3 mg  Self Yes No   Sig: Take 3 mg by mouth   montelukast (SINGULAIR) 10 mg tablet   Yes No   Sig: Take 10 mg by mouth daily   naproxen (NAPROSYN) 500 mg tablet  Self No No   Sig: Take 1 tablet (500 mg total) by mouth 2 (two) times a day with meals   neomycin-polymyxin-dexamethasone (MAXITROL) ophthalmic suspension   Yes No   Sig: INSTILL 1 DROP INTO BOTH EYES 4 TIMES A DAY FOR 1 WEEK   Patient not taking: Reported on 4/25/2023   nystatin (MYCOSTATIN) powder   No No   Sig: Apply topically 2 (two) times a day   nystatin-triamcinolone (MYCOLOG-II) cream   No No   Sig: Apply topically 2 (two) times a day   rosuvastatin (CRESTOR) 20 MG tablet  Self Yes No   Sig: TAKE 1 TABLET (20 MG) BY MOUTH DAILY   Patient not taking: Reported on 4/25/2023   semaglutide, 1 mg/dose, (Ozempic) 4 mg/3 mL injection pen   Yes No   Sig: Inject 1 mg under the skin   valsartan-hydrochlorothiazide (DIOVAN-HCT) 160-25 MG per tablet  Self Yes No   Sig: TAKE 1 TAB BY MOUTH DAILY.    Patient not taking: Reported on 4/25/2023      Facility-Administered Medications: None     Social History:     Social History     Socioeconomic History    Marital status: /Civil Union     Spouse name: Not on file    Number of children: Not on file    Years of education: Not on file    Highest education level: Not on file   Occupational History    Not on file   Tobacco Use    Smoking status: Never    Smokeless tobacco: Never   Substance and Sexual Activity    Alcohol use: Not Currently    Drug use: Never    Sexual activity: Not on file   Other Topics Concern    Not on file   Social History Narrative    Not on file     Social Determinants of Health     Financial Resource Strain: Not on file   Food Insecurity: Not on file   Transportation Needs: Not on file   Physical Activity: Not on file   Stress: Not on file   Social Connections: Not on file   Intimate Partner Violence: Not on file   Housing Stability: Not on file     Patient Pre-hospital Living Situation: Lives at home  Patient Pre-hospital Level of Mobility: Was independent and mobile  Patient Pre-hospital Diet Restrictions: No restriction    Family History:  History reviewed. No pertinent family history. Physical Exam:   Vitals:   Blood Pressure: (!) 177/93 (12/08/23 1430)  Pulse: 99 (12/08/23 1430)  Temperature: 98.4 °F (36.9 °C) (12/08/23 0934)  Temp Source: Oral (12/08/23 0934)  Respirations: 20 (12/08/23 1430)  SpO2: 98 % (12/08/23 1507)    General appearance: alert, fatigued, appears stated age, and cooperative  Constitutional- looks a little weak  HEENT - atraumatic and normocephalic  Neck- supple  Skin - no fresh rash  Extremities no fresh focal deformities  Cardiovascular- S1-S2 heard  Respiratory- bilateral air entry present, no crackles or rhonchi  Skin - no fresh rash  Abdomen - normal bowel sounds present, no rebound tenderness  CNS- No fresh focal deficits except for left facial hemisensory loss and some numbness in the left hand  Psych- no acute psychosis     Lab Results: I have personally reviewed pertinent reports.     Results from last 7 days   Lab Units 12/08/23  1006   WBC Thousand/uL 6.82   HEMOGLOBIN g/dL 14.0   HEMATOCRIT % 45.8   PLATELETS Thousands/uL 353     Results from last 7 days   Lab Units 12/08/23  1006   SODIUM mmol/L 139   POTASSIUM mmol/L 4.3   CHLORIDE mmol/L 104   CO2 mmol/L 26   ANION GAP mmol/L 9   BUN mg/dL 11   CREATININE mg/dL 0.83   CALCIUM mg/dL 9.4   EGFR ml/min/1.73sq m 72   GLUCOSE RANDOM mg/dL 184*     Results from last 7 days   Lab Units 12/08/23  1006   INR  0.96                                Results from last 7 days   Lab Units 12/08/23  1006   INFLUENZA A PCR  Negative   INFLUENZA B PCR  Negative   RSV PCR  Negative     Imaging: I have personally reviewed pertinent reports. CT stroke alert brain    Result Date: 12/8/2023  Impression: No acute intracranial CT abnormality. Findings were directly discussed with Waldo Dykes at 10:06 a.m. Workstation performed: [id number]     CTA stroke alert (head/neck)    Result Date: 12/8/2023  Impression: 1. No intracranial large vessel occlusion. 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 2.  Anatomic variant separate origins of left internal and external carotid arteries. Also normal variant retropharyngeal course of internal carotid arteries. 3.  No hemodynamically significant stenosis of the cervical carotid and vertebral arteries. Findings were directly discussed with Waldo Dykes at 10:06 a.m. Workstation performed: [id number]       EKG, Pathology, and Other Studies Reviewed on Admission:   EKG  Result Date: 12/08/23  Personally reviewed strips with impression of: No acute ST or T wave changes    Epic Records Reviewed: Yes    Tasneem Cortés MD  [facility]    ** Please Note: This note has been constructed using a voice recognition system.  **

## 2023-12-08 NOTE — ED PROVIDER NOTES
History  Chief Complaint   Patient presents with    Numbness     Pt c/o pins and needles feeling on her left arm which started this morning, pt also c/o lightheadedness      55-year-old female presenting here with a chief complaint of waking up with left-sided numbness and tingling sensation involving her entire left side of her body including her face. She reports she went up to walk felt unsteady and lightheaded. She presents here with interval improvement in her symptoms but not complete resolution. Will initiate stroke alert initial NIH of 1 with some mild sensory deficit on the left          Prior to Admission Medications   Prescriptions Last Dose Informant Patient Reported? Taking?    Empagliflozin-metFORMIN HCl (Synjardy) 5-500 MG TABS   Yes No   Sig: Take 1 tablet by mouth 2 (two) times a day   albuterol (PROVENTIL HFA,VENTOLIN HFA) 90 mcg/act inhaler   Yes No   Sig: Inhale 2 puffs 4 (four) times a day   Patient not taking: Reported on 2023   atorvastatin (LIPITOR) 40 mg tablet   Yes No   Sig: Take 40 mg by mouth daily   Patient not taking: Reported on 2023   azelastine (OPTIVAR) 0.05 % ophthalmic solution  Self Yes No   Sig: Apply 1 drop to eye   Patient not taking: Reported on 2023   cyanocobalamin (VITAMIN B-12) 1000 MCG tablet  Self Yes No   Sig: TAKE 1 TABLET BY MOUTH EVERY DAY   cyclobenzaprine (FLEXERIL) 10 mg tablet  Self Yes No   Sig: TAKE 1 TABLET BY MOUTH AT BEDTIME AS NEEDED FOR MUSCLE SPASM   Patient not taking: Reported on 2023   escitalopram (LEXAPRO) 20 mg tablet   Yes No   Sig: Take 1 tablet by mouth daily   Patient not taking: Reported on 2023   estradiol (ESTRACE) 0.1 mg/g vaginal cream   Yes No   Sig: Insert 2 g into the vagina daily   Patient not taking: Reported on 2023   fluticasone (FLONASE) 50 mcg/act nasal spray   Yes No   Si sprays into each nostril daily   Patient not taking: Reported on 2023   glyBURIDE (DIABETA PO)   Yes No   Sig: glyburide   Patient not taking: Reported on 2023   lifitegrast (Laurance Axton) 5 % op solution   Yes No   Sig: INSTILL 1 DROP INTO BOTH EYES TWICE A DAY   loteprednol etabonate (LOTEMAX) 0.5 % ophthalmic suspension   Yes No   Sig: INSTILL 1 DROP INTO BOTH EYES 4 TIMES A DAY FOR 1 WEEK THEN TWICE A DAY FOR 1 WEEK   Patient not taking: Reported on 2023   melatonin 3 mg  Self Yes No   Sig: Take 3 mg by mouth   montelukast (SINGULAIR) 10 mg tablet   Yes No   Sig: Take 10 mg by mouth daily   naproxen (NAPROSYN) 500 mg tablet  Self No No   Sig: Take 1 tablet (500 mg total) by mouth 2 (two) times a day with meals   neomycin-polymyxin-dexamethasone (MAXITROL) ophthalmic suspension   Yes No   Sig: INSTILL 1 DROP INTO BOTH EYES 4 TIMES A DAY FOR 1 WEEK   Patient not taking: Reported on 2023   nystatin (MYCOSTATIN) powder   No No   Sig: Apply topically 2 (two) times a day   nystatin-triamcinolone (MYCOLOG-II) cream   No No   Sig: Apply topically 2 (two) times a day   rosuvastatin (CRESTOR) 20 MG tablet  Self Yes No   Sig: TAKE 1 TABLET (20 MG) BY MOUTH DAILY   Patient not taking: Reported on 2023   semaglutide, 1 mg/dose, (Ozempic) 4 mg/3 mL injection pen   Yes No   Sig: Inject 1 mg under the skin   valsartan-hydrochlorothiazide (DIOVAN-HCT) 160-25 MG per tablet  Self Yes No   Sig: TAKE 1 TAB BY MOUTH DAILY. Patient not taking: Reported on 2023      Facility-Administered Medications: None       Past Medical History:   Diagnosis Date    Diabetes mellitus ([address])     Hyperlipidemia     Hypertension        Past Surgical History:   Procedure Laterality Date     SECTION      HYSTERECTOMY      TONSILLECTOMY         History reviewed. No pertinent family history. I have reviewed and agree with the history as documented.     E-Cigarette/Vaping     E-Cigarette/Vaping Substances     Social History     Tobacco Use    Smoking status: Never    Smokeless tobacco: Never   Substance Use Topics    Alcohol use: Not Currently    Drug use: Never       Review of Systems   Constitutional:  Negative for diaphoresis, fatigue and fever. HENT:  Negative for congestion, ear pain, nosebleeds and sore throat. Eyes:  Negative for photophobia, pain, discharge and visual disturbance. Respiratory:  Negative for cough, choking, chest tightness, shortness of breath and wheezing. Cardiovascular:  Negative for chest pain and palpitations. Gastrointestinal:  Negative for abdominal distention, abdominal pain, diarrhea and vomiting. Genitourinary:  Negative for dysuria, flank pain and frequency. Musculoskeletal:  Negative for back pain, gait problem and joint swelling. Skin:  Negative for color change and rash. Neurological:  Positive for numbness. Negative for dizziness, syncope and headaches. Psychiatric/Behavioral:  Negative for behavioral problems and confusion. The patient is not nervous/anxious. All other systems reviewed and are negative. Physical Exam  Physical Exam  Vitals and nursing note reviewed. Constitutional:       General: She is not in acute distress. Appearance: She is well-developed. She is not ill-appearing or toxic-appearing. HENT:      Head: Normocephalic and atraumatic. Nose: No rhinorrhea. Mouth/Throat:      Mouth: Mucous membranes are moist.      Dentition: Normal dentition. Eyes:      General: No visual field deficit. Right eye: No discharge. Left eye: No discharge. Cardiovascular:      Rate and Rhythm: Normal rate and regular rhythm. Pulmonary:      Effort: Pulmonary effort is normal. No accessory muscle usage or respiratory distress. Abdominal:      General: There is no distension. Tenderness: There is no guarding. Musculoskeletal:         General: Normal range of motion. Cervical back: Normal range of motion and neck supple. No rigidity. Skin:     General: Skin is warm and dry.    Neurological:      Mental Status: She is alert and oriented to person, place, and time. GCS: GCS eye subscore is 4. GCS verbal subscore is 5. GCS motor subscore is 6. Cranial Nerves: No dysarthria or facial asymmetry. Motor: No weakness or pronator drift. Coordination: Coordination normal. Heel to Shin Test normal.   Psychiatric:         Behavior: Behavior is cooperative.          Vital Signs  ED Triage Vitals [12/08/23 0934]   Temperature Pulse Respirations Blood Pressure SpO2   98.4 °F (36.9 °C) 90 18 (!) 181/86 97 %      Temp Source Heart Rate Source Patient Position - Orthostatic VS BP Location FiO2 (%)   Oral Monitor Sitting Left arm --      Pain Score       --           Vitals:    12/08/23 0934 12/08/23 0950 12/08/23 1000 12/08/23 1015   BP: (!) 181/86 (!) 177/80 131/59 167/73   Pulse: 90 85 93 93   Patient Position - Orthostatic VS: Sitting Lying Lying Lying         Visual Acuity  Visual Acuity      Flowsheet Row Most Recent Value   L Pupil Size (mm) 3   R Pupil Size (mm) 3            ED Medications  Medications   aspirin tablet 325 mg (has no administration in time range)   clopidogrel (PLAVIX) tablet 300 mg (has no administration in time range)   iohexol (OMNIPAQUE) 350 MG/ML injection (MULTI-DOSE) 85 mL (85 mL Intravenous Given 12/8/23 0959)       Diagnostic Studies  Results Reviewed       Procedure Component Value Units Date/Time    HS Troponin I 2hr [[id number]]     Lab Status: No result Specimen: Blood     HS Troponin 0hr (reflex protocol) [[id number]]  (Normal) Collected: 12/08/23 1006    Lab Status: Final result Specimen: Blood from Arm, Right Updated: 12/08/23 1040     hs TnI 0hr 8 ng/L     Basic metabolic panel [[id number]]  (Abnormal) Collected: 12/08/23 1006    Lab Status: Final result Specimen: Blood from Arm, Right Updated: 12/08/23 1031     Sodium 139 mmol/L      Potassium 4.3 mmol/L      Chloride 104 mmol/L      CO2 26 mmol/L      ANION GAP 9 mmol/L      BUN 11 mg/dL      Creatinine 0.83 mg/dL      Glucose 184 mg/dL Calcium 9.4 mg/dL      eGFR 72 ml/min/1.73sq m     Narrative:      [facility] guidelines for Chronic Kidney Disease (CKD):     Stage 1 with normal or high GFR (GFR > 90 mL/min/1.73 square meters)    Stage 2 Mild CKD (GFR = 60-89 mL/min/1.73 square meters)    Stage 3A Moderate CKD (GFR = 45-59 mL/min/1.73 square meters)    Stage 3B Moderate CKD (GFR = 30-44 mL/min/1.73 square meters)    Stage 4 Severe CKD (GFR = 15-29 mL/min/1.73 square meters)    Stage 5 End Stage CKD (GFR <15 mL/min/1.73 square meters)  Note: GFR calculation is accurate only with a steady state creatinine    Protime-INR [[id number]]  (Normal) Collected: 12/08/23 1006    Lab Status: Final result Specimen: Blood from Arm, Right Updated: 12/08/23 1026     Protime 13.3 seconds      INR 0.96    APTT [[id number]]  (Normal) Collected: 12/08/23 1006    Lab Status: Final result Specimen: Blood from Arm, Right Updated: 12/08/23 1026     PTT 26 seconds     CBC and Platelet [[id number]]  (Abnormal) Collected: 12/08/23 1006    Lab Status: Final result Specimen: Blood from Arm, Right Updated: 12/08/23 1011     WBC 6.82 Thousand/uL      RBC 5.52 Million/uL      Hemoglobin 14.0 g/dL      Hematocrit 45.8 %      MCV 83 fL      MCH 25.4 pg      MCHC 30.6 g/dL      RDW 14.1 %      Platelets 831 Thousands/uL      MPV 9.8 fL     FLU/RSV/COVID - if FLU/RSV clinically relevant [[id number]] Collected: 12/08/23 1006    Lab Status: In process Specimen: Nares from Nose Updated: 12/08/23 1008    Fingerstick Glucose (POCT) [[id number]]  (Abnormal) Collected: 12/08/23 0944    Lab Status: Final result Updated: 12/08/23 0945     POC Glucose 178 mg/dl                    CTA stroke alert (head/neck)   Final Result by Harvinder Holden MD (12/08 1034)      1. No intracranial large vessel occlusion. 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 2.  Anatomic variant separate origins of left internal and external carotid arteries.  Also normal variant retropharyngeal course of internal carotid arteries. 3.  No hemodynamically significant stenosis of the cervical carotid and vertebral arteries. Findings were directly discussed with Rexie Cheadle at 10:06 a.m. Workstation performed: [id number]         CT stroke alert brain   Final Result by Ela Anedrson MD (12/08 1034)      No acute intracranial CT abnormality. Findings were directly discussed with Rexie Cheadle at 10:06 a.m. Workstation performed: [id number]                    Procedures  Procedures         ED Course                  Stroke Assessment       Row Name 12/08/23 7579             NIH Stroke Scale    Interval Baseline      Level of Consciousness (1a.) 0      LOC Questions (1b.) 0      LOC Commands (1c.) 0      Best Gaze (2.) 0      Visual (3.) 0      Facial Palsy (4.) 0      Motor Arm, Left (5a.) 0      Motor Arm, Right (5b.) 0      Motor Leg, Left (6a.) 0      Motor Leg, Right (6b.) 0      Limb Ataxia (7.) 0      Sensory (8.) 1      Best Language (9.) 0      Dysarthria (10.) 0      Extinction and Inattention (11.) (Formerly Neglect) 0      Total 1                    Flowsheet Row Most Recent Value   Thrombolytic Decision Options    Thrombolytic Decision Patient not a candidate. Patient is not a candidate options Symptoms resolved/clearly non disabling. SBIRT 22yo+      Flowsheet Row Most Recent Value   Initial Alcohol Screen: US AUDIT-C     1. How often do you have a drink containing alcohol? 0 Filed at: 12/08/2023 0934   2. How many drinks containing alcohol do you have on a typical day you are drinking? 0 Filed at: 12/08/2023 0934   3b. FEMALE Any Age, or MALE 65+: How often do you have 4 or more drinks on one occassion? 0 Filed at: 12/08/2023 0934   Audit-C Score 0 Filed at: 12/08/2023 9867   JESSICA: How many times in the past year have you. ..     Used an illegal drug or used a prescription medication for non-medical reasons? Never Filed at: 12/08/2023 4052                      Medical Decision Making  Patient evaluated by neurology. Stroke symptoms are improving and are clearly not disabling at this point. Will bring in along stroke pathway    Amount and/or Complexity of Data Reviewed  Labs: ordered. Radiology: ordered. Risk  Prescription drug management. Disposition  Final diagnoses:   Numbness     Time reflects when diagnosis was documented in both MDM as applicable and the Disposition within this note       Time User Action Codes Description Comment    12/8/2023  9:50 AM Dave Carrasco Add [R20.0] Numbness           ED Disposition       ED Disposition   Admit    Condition   Stable    Date/Time   Fri Dec 8, 2023 1040    Comment   Case was discussed with John Spear and the patient's admission status was agreed to be Admission Status: observation status to the service of Dr. John Spear . Follow-up Information    None         Patient's Medications   Discharge Prescriptions    No medications on file       No discharge procedures on file.     PDMP Review       None            ED Provider  Electronically Signed by             JOCELIN Fortune  12/08/23 1043

## 2023-12-08 NOTE — SPEECH THERAPY NOTE
Speech Language/Pathology      Patient passed nursing dysphagia screen; presently tolerating oral diet. Need for formal evaluation of swallow function is not indicated at this time. Please re-order should status change or concerns arise.  D/w RN via TT     Jana Carpenter, [address] [facility], [address] [place] [address]  Speech-Language Pathologist  [place] #[id number]  [place] #[id number]

## 2023-12-08 NOTE — ASSESSMENT & PLAN NOTE
Patient does have a history of gout and arthritis but patient states that she is on no medications for the same and she feels quite okay  Will give Tylenol as needed

## 2023-12-08 NOTE — CONSULTS
Consultation - Stroke   Jordin Zaidi 76 y.o. female MRN: [id number]  Unit/Bed#: ED 28 Encounter: [id number]      Assessment/Plan     Stroke-like symptoms  Assessment & Plan  Jordin Zaidi is a 76 y.o.  female with HTN, DM, HLD, and medication non-compliance who presented to [facility] ED 12/8/23 with stroke like symptoms. Patient reports that she woke up this morning around 0800 with "pins and needles" on her entire left side; face, arm, and leg. She additionally felt lightheaded and off balance without focal weakness. She also felt that her speech was slurred for about 1 minute and resolved spontaneously without intervention. No vision changes reported. Stroke alert initiated with NIHSS 1 for left sided sensory deficit. /80, . CTH/CTA without acute intracranial findings. Not a candidate for TNK or thrombectomy. Admitted on stroke pathway for additional work-up    Imaging:  CTH:No acute intracranial CT abnormality. CTA:1. No intracranial large vessel occlusion. 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 2.  Anatomic variant separate origins of left internal and external carotid arteries. Also normal variant retropharyngeal course of internal carotid arteries. 3.  No hemodynamically significant stenosis of the cervical carotid and vertebral arteries. Recommendations:  Stroke vs TIA  Admit on stroke pathway  Load with asa 325mg and plavix 300mg x1 now followed by asa 81mg and plavix 75mg daily starting 12/9/23  MRI brain  Echo  Lipid Panel  Hemoglobin A1c  TSH  DAPT with asa 81mg and plavix 75mg daily for 21 days then asa monotherapy  Atorvastatin 40 mg daily  Permissive HTN, labetalol if SBP >200  Euglycemic, normothermic goal  Continue telemetry  PT/OT/ST  Stroke education  Frequent neuro checks. Continue to monitor and notify neurology with any changes.   STAT CT head for any acute change in neuro exam  - Medical management and supportive care per primary team. Correction of any metabolic or infectious disturbances. Thrombolytic Decision: Patient not a candidate. Unclear time of onset outside appropriate time window. and Symptoms resolved/clearly non disabling. Recommendations for outpatient neurological follow up have yet to be determined. Reason for Consult / Principal Problem: stroke alert  Hx and PE limited by: na  Patient last known well: 2023 pm  Stroke alert called: [facility]  Neurology time of arrival: 0942  HPI: Stephanie Ddoge is a 76 y.o.  female with HTN, DM, HLD, and medication non-compliance who presented to [facility] ED 23 with stroke like symptoms. Patient reports that she woke up this morning around 0800 with "pins and needles" on her entire left side; face, arm, and leg. She additionally felt lightheaded and off balance without focal weakness. She also felt that her speech was slurred for about 1 minute and resolved spontaneously without intervention. No vision changes reported. Stroke alert initiated with NIHSS 1 for left sided sensory deficit. /80, . CTH/CTA without acute intracranial findings. Not a candidate for TNK or thrombectomy.  Admitted on stroke pathway for additional work-up    At present subjective pins/needles left face/arm/leg improving, but not resolved    Inpatient consult to Neurology  Consult performed by: JOCELIN Hartmann  Consult ordered by: JOCELIN Sherman      Review of Systems  See HPI    Historical Information   Past Medical History:   Diagnosis Date    Diabetes mellitus ([address])     Hyperlipidemia     Hypertension      Past Surgical History:   Procedure Laterality Date     SECTION      HYSTERECTOMY      TONSILLECTOMY       Social History   Social History     Substance and Sexual Activity   Alcohol Use Not Currently     Social History     Substance and Sexual Activity   Drug Use Never     E-Cigarette/Vaping     E-Cigarette/Vaping Substances     Social History     Tobacco Use   Smoking Status Never   Smokeless Tobacco Never     Family History: History reviewed. No pertinent family history. Review of previous medical records was completed. Meds/Allergies   all current active meds have been reviewed    No Known Allergies    Objective   Vitals:Blood pressure (!) 177/93, pulse 99, temperature 98.4 °F (36.9 °C), temperature source Oral, resp. rate 20, SpO2 98 %. ,There is no height or weight on file to calculate BMI. No intake or output data in the 24 hours ending 12/08/23 1514    Invasive Devices: Invasive Devices       Peripheral Intravenous Line  Duration             Peripheral IV 12/08/23 Proximal;Right;Ventral (anterior) Forearm <1 day                    Physical Exam  Vitals reviewed. Constitutional:       General: She is not in acute distress. HENT:      Head: Normocephalic and atraumatic. Pulmonary:      Effort: Pulmonary effort is normal.   Neurological:      Mental Status: She is alert and oriented to person, place, and time. Cranial Nerves: Cranial nerves 2-12 are intact. Motor: Motor strength is normal.  Psychiatric:         Speech: Speech normal.       Neurologic Exam     Mental Status   Oriented to person, place, and time. Attention: normal. Concentration: normal.   Speech: speech is normal   Level of consciousness: alert  Able to name object. Able to read. Able to repeat. Cranial Nerves   Cranial nerves II through XII intact. Motor Exam     Strength   Strength 5/5 throughout. LUE pronation without drift     Sensory Exam   Increased sharp left arm/leg; hyperasthesia on left with all sensory modalities tested  Vibration equal     Gait, Coordination, and Reflexes     Tremor   Resting tremor: absent    NIHSS:  1a.Level of Consciousness: 0 = Alert   1b. LOC Questions: 0 = Answers both correctly   1c. LOC Commands: 0 = Obeys both correctly   2. Best Gaze: 0 = Normal   3. Visual: 0 = No visual field loss   4. Facial Palsy: 0=Normal symmetric movement   5a.  Motor Right Arm: 0=No drift, limb holds 90 (or 45) degrees for full 10 seconds   5b. Motor Left Arm: 0=No drift, limb holds 90 (or 45) degrees for full 10 seconds   6a. Motor Right Le=No drift, limb holds 90 (or 45) degrees for full 10 seconds   6b. Motor Left Le=No drift, limb holds 90 (or 45) degrees for full 10 seconds   7. Limb Ataxia:  0=Absent   8. Sensory: 1=Mild to moderate sensory loss; patient feels pinprick is less sharp or is dull on the affected side; there is a loss of superficial pain with pinprick but patient is aware She is being touched   9. Best Language:  0=No aphasia, normal   10. Dysarthria: 0=Normal articulation   11. Extinction and Inattention (formerly Neglect): 0=No abnormality   Total Score: 1        Time NIHSS was completed: 1015    Modified [place] Score:  0 (No baseline symptoms/disability)    Lab Results: I have personally reviewed pertinent reports. Imaging Studies: I have personally reviewed pertinent reports. and I have personally reviewed pertinent films in PACS  EKG, Pathology, and Other Studies: I have personally reviewed pertinent reports. Code Status: Level 1 - Full Code    Counseling / Coordination of Care  CC time 30 minutes. Exam completed by Dr. Froylan Tineo. Images and plan reviewed with Dr. Froylan Tineo.  Pland discussed with patient,  at bedside and primary service

## 2023-12-08 NOTE — ASSESSMENT & PLAN NOTE
12/9/2023: Seen again today by same neurology team is this right-hand-dominant 76 y.o. female with no prior neurologic history however she does have a traditional history of HTN, DM, HLD, and self-reported medication non-compliance who presented to [facility] ED 12/8/23 with stroke like symptoms. Patient reports that she woke up this morning around 0800 with "pins and needles" on her entire left side; face, arm, and leg. She additionally felt lightheaded and off balance without focal weakness. She also felt that her speech was slurred for about 1 minute and resolved spontaneously without intervention. No vision changes reported. Stroke alert initiated with NIHSS 1 for left sided sensory deficit. /80, . CTH/CTA without acute intracranial findings. Not a candidate for TNK or thrombectomy. Admitted on stroke pathway for additional work-up  On exam today she reports a diminished asymmetry in her face for the sensory symptoms however she reports she still has much of the same dysesthesia in the left arm and the left leg and foot. She otherwise reports that she feels well and her exam was otherwise nonfocal.  Workup and imaging:  CTH:No acute intracranial CT abnormality. CTA:1. No intracranial large vessel occlusion. 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 2.  Anatomic variant separate origins of left internal and external carotid arteries. Also normal variant retropharyngeal course of internal carotid arteries. 3.  No hemodynamically significant stenosis of the cervical carotid and vertebral arteries. MRI: Notes minimal small vessel disease she had a small right midline pontine infarct appreciated. Recommendations:  Loaded with asa 325mg and plavix 300mg, now to be on daily asa 81mg and 21 days only of plavix 75mg daily   Medical management and supportive care per primary outpatient team.   She needs to schedule an outpatient neurology appointment.   She can be seen by our [facility] neurology office nonurgently. I also discussed with this patient in a home blood pressure management program as well as maintaining her prescribed meds on a daily basis.

## 2023-12-09 VITALS
DIASTOLIC BLOOD PRESSURE: 90 MMHG | SYSTOLIC BLOOD PRESSURE: 166 MMHG | TEMPERATURE: 98.3 F | OXYGEN SATURATION: 94 % | WEIGHT: 174 LBS | HEART RATE: 92 BPM | RESPIRATION RATE: 17 BRPM | HEIGHT: 60 IN | BODY MASS INDEX: 34.16 KG/M2

## 2023-12-09 PROBLEM — I63.50 RIGHT PONTINE STROKE (HCC): Status: ACTIVE | Noted: 2023-12-08

## 2023-12-09 LAB
ANION GAP SERPL CALCULATED.3IONS-SCNC: 8 MMOL/L
BASOPHILS # BLD AUTO: 0.07 THOUSANDS/ÂΜL (ref 0–0.1)
BASOPHILS NFR BLD AUTO: 1 % (ref 0–1)
BUN SERPL-MCNC: 16 MG/DL (ref 5–25)
CALCIUM SERPL-MCNC: 8.9 MG/DL (ref 8.4–10.2)
CHLORIDE SERPL-SCNC: 105 MMOL/L (ref 96–108)
CHOLEST SERPL-MCNC: 225 MG/DL
CO2 SERPL-SCNC: 24 MMOL/L (ref 21–32)
CREAT SERPL-MCNC: 0.78 MG/DL (ref 0.6–1.3)
EOSINOPHIL # BLD AUTO: 0.19 THOUSAND/ÂΜL (ref 0–0.61)
EOSINOPHIL NFR BLD AUTO: 3 % (ref 0–6)
ERYTHROCYTE [DISTWIDTH] IN BLOOD BY AUTOMATED COUNT: 14.1 % (ref 11.6–15.1)
EST. AVERAGE GLUCOSE BLD GHB EST-MCNC: 189 MG/DL
GFR SERPL CREATININE-BSD FRML MDRD: 78 ML/MIN/1.73SQ M
GLUCOSE P FAST SERPL-MCNC: 164 MG/DL (ref 65–99)
GLUCOSE SERPL-MCNC: 164 MG/DL (ref 65–140)
GLUCOSE SERPL-MCNC: 181 MG/DL (ref 65–140)
GLUCOSE SERPL-MCNC: 286 MG/DL (ref 65–140)
HBA1C MFR BLD: 8.2 %
HCT VFR BLD AUTO: 39.9 % (ref 34.8–46.1)
HDLC SERPL-MCNC: 40 MG/DL
HGB BLD-MCNC: 12.4 G/DL (ref 11.5–15.4)
IMM GRANULOCYTES # BLD AUTO: 0.02 THOUSAND/UL (ref 0–0.2)
IMM GRANULOCYTES NFR BLD AUTO: 0 % (ref 0–2)
LDLC SERPL CALC-MCNC: 152 MG/DL (ref 0–100)
LYMPHOCYTES # BLD AUTO: 2.39 THOUSANDS/ÂΜL (ref 0.6–4.47)
LYMPHOCYTES NFR BLD AUTO: 34 % (ref 14–44)
MCH RBC QN AUTO: 25.7 PG (ref 26.8–34.3)
MCHC RBC AUTO-ENTMCNC: 31.1 G/DL (ref 31.4–37.4)
MCV RBC AUTO: 83 FL (ref 82–98)
MONOCYTES # BLD AUTO: 0.51 THOUSAND/ÂΜL (ref 0.17–1.22)
MONOCYTES NFR BLD AUTO: 7 % (ref 4–12)
NEUTROPHILS # BLD AUTO: 3.85 THOUSANDS/ÂΜL (ref 1.85–7.62)
NEUTS SEG NFR BLD AUTO: 55 % (ref 43–75)
NRBC BLD AUTO-RTO: 0 /100 WBCS
PLATELET # BLD AUTO: 297 THOUSANDS/UL (ref 149–390)
PMV BLD AUTO: 9.7 FL (ref 8.9–12.7)
POTASSIUM SERPL-SCNC: 3.7 MMOL/L (ref 3.5–5.3)
RBC # BLD AUTO: 4.83 MILLION/UL (ref 3.81–5.12)
SODIUM SERPL-SCNC: 137 MMOL/L (ref 135–147)
TRIGL SERPL-MCNC: 165 MG/DL
URATE SERPL-MCNC: 5.7 MG/DL (ref 2–7.5)
WBC # BLD AUTO: 7.03 THOUSAND/UL (ref 4.31–10.16)

## 2023-12-09 PROCEDURE — 85025 COMPLETE CBC W/AUTO DIFF WBC: CPT | Performed by: INTERNAL MEDICINE

## 2023-12-09 PROCEDURE — 97166 OT EVAL MOD COMPLEX 45 MIN: CPT

## 2023-12-09 PROCEDURE — 80048 BASIC METABOLIC PNL TOTAL CA: CPT | Performed by: INTERNAL MEDICINE

## 2023-12-09 PROCEDURE — 84550 ASSAY OF BLOOD/URIC ACID: CPT | Performed by: INTERNAL MEDICINE

## 2023-12-09 PROCEDURE — 83036 HEMOGLOBIN GLYCOSYLATED A1C: CPT | Performed by: INTERNAL MEDICINE

## 2023-12-09 PROCEDURE — NC001 PR NO CHARGE: Performed by: STUDENT IN AN ORGANIZED HEALTH CARE EDUCATION/TRAINING PROGRAM

## 2023-12-09 PROCEDURE — 99239 HOSP IP/OBS DSCHRG MGMT >30: CPT | Performed by: NURSE PRACTITIONER

## 2023-12-09 PROCEDURE — 80061 LIPID PANEL: CPT | Performed by: INTERNAL MEDICINE

## 2023-12-09 PROCEDURE — 99215 OFFICE O/P EST HI 40 MIN: CPT | Performed by: STUDENT IN AN ORGANIZED HEALTH CARE EDUCATION/TRAINING PROGRAM

## 2023-12-09 PROCEDURE — 82948 REAGENT STRIP/BLOOD GLUCOSE: CPT

## 2023-12-09 PROCEDURE — 97162 PT EVAL MOD COMPLEX 30 MIN: CPT

## 2023-12-09 RX ORDER — CLOPIDOGREL BISULFATE 75 MG/1
75 TABLET ORAL DAILY
Qty: 21 TABLET | Refills: 0 | Status: SHIPPED | OUTPATIENT
Start: 2023-12-10 | End: 2023-12-31

## 2023-12-09 RX ORDER — LISINOPRIL 10 MG/1
10 TABLET ORAL DAILY
Status: DISCONTINUED | OUTPATIENT
Start: 2023-12-09 | End: 2023-12-09

## 2023-12-09 RX ORDER — ATORVASTATIN CALCIUM 40 MG/1
40 TABLET, FILM COATED ORAL DAILY
Qty: 40 TABLET | Refills: 1 | Status: SHIPPED | OUTPATIENT
Start: 2023-12-09

## 2023-12-09 RX ORDER — LOSARTAN POTASSIUM 50 MG/1
100 TABLET ORAL DAILY
Status: DISCONTINUED | OUTPATIENT
Start: 2023-12-09 | End: 2023-12-09 | Stop reason: HOSPADM

## 2023-12-09 RX ORDER — CLOPIDOGREL BISULFATE 75 MG/1
75 TABLET ORAL DAILY
Status: DISCONTINUED | OUTPATIENT
Start: 2023-12-10 | End: 2023-12-09 | Stop reason: HOSPADM

## 2023-12-09 RX ORDER — ASPIRIN 81 MG/1
81 TABLET, CHEWABLE ORAL DAILY
Refills: 0
Start: 2023-12-10

## 2023-12-09 RX ORDER — VALSARTAN AND HYDROCHLOROTHIAZIDE 160; 25 MG/1; MG/1
1 TABLET ORAL DAILY
Qty: 30 TABLET | Refills: 0 | Status: SHIPPED | OUTPATIENT
Start: 2023-12-09

## 2023-12-09 RX ADMIN — INSULIN LISPRO 2 UNITS: 100 INJECTION, SOLUTION INTRAVENOUS; SUBCUTANEOUS at 13:37

## 2023-12-09 RX ADMIN — INSULIN LISPRO 1 UNITS: 100 INJECTION, SOLUTION INTRAVENOUS; SUBCUTANEOUS at 08:22

## 2023-12-09 RX ADMIN — ASPIRIN 81 MG: 81 TABLET, CHEWABLE ORAL at 08:21

## 2023-12-09 RX ADMIN — ENOXAPARIN SODIUM 40 MG: 40 INJECTION SUBCUTANEOUS at 08:22

## 2023-12-09 RX ADMIN — CLOPIDOGREL 75 MG: 75 TABLET ORAL at 08:21

## 2023-12-09 RX ADMIN — LOSARTAN POTASSIUM 100 MG: 50 TABLET, FILM COATED ORAL at 13:54

## 2023-12-09 RX ADMIN — CYANOCOBALAMIN TAB 500 MCG 1000 MCG: 500 TAB at 08:21

## 2023-12-09 NOTE — DISCHARGE SUMMARY
[address]  Discharge- Stephanie Dodge 1955, 76 y.o. female MRN: [id number]  Unit/Bed#: [facility] Encounter: [id number]  Primary Care Provider: Raheem Black MD   Date and time admitted to hospital: 12/8/2023  9:38 AM    * Small right pontine stroke with residual dysesthesias  Assessment & Plan  This is a 35-year-old female with significant history of hypertension, diabetes and dyslipidemia came in with significant history of pins and needle sensation on the left side of the face when she got up this morning around 8 AM.    Symptoms of numbness and slurred speech present on admission improving  Initiated on stroke pathway  CTA head/neck noting moderate to severe stenosis of bilateral ICA; no evidence of stroke CT head negative  Neurology consulted  Continue DAPT and statin; 21 days asa and plavix 21 days then asa continue statin; tight bp control.  Resume home bp med and follow up with pcp   NIH scale of 1  MRI brain pending per neurology reviewed imaging patient stable from their perspective for discharge  Recommend follow up with pcp, vascular,and neurology   PT/OT/ST evaluation good for discharge home with out patient PT/OT    Type 2 diabetes mellitus with diabetic cataract [facility])  Assessment & Plan  Lab Results   Component Value Date    HGBA1C 7.2 (H) 02/13/2023       Recent Labs     12/08/23  0944 12/08/23  1619 12/08/23  2141 12/09/23  0740   POCGLU 178* 127 161* 181*         Blood Sugar Average: Last 72 hrs:  (P) 161.75    Hold oral medications and GLP-1 analog injections,   Continue SSI and blood glucose monitoring while in hospital     Gout  Assessment & Plan  Patient does have a history of gout and arthritis but patient states that she is on no medications for the same and she feels quite okay  Continue Tylenol as needed    Benign essential hypertension  Assessment & Plan  Patient was on valsartan hydrochlorothiazide at home  Resume home diovan will give dose of auto sub losartan 100 mg prior to discharge  Out patient follow up with pcp     Blood pressure 164/69, pulse 95, temperature 98.3 °F (36.8 °C), resp. rate 17, height 5' (1.524 m), weight 78.9 kg (174 lb), SpO2 94 %. Medical Problems       Resolved Problems  Date Reviewed: 12/9/2023   None       Discharging Physician / Practitioner: JOCELIN Mayes  PCP: Clarice Vicente MD  Admission Date:   Admission Orders (From admission, onward)       Ordered        12/08/23 1041  Place in Observation  Once                          Discharge Date: 12/09/23    Consultations During Hospital Stay:  Neurology    Procedures Performed:   None    Significant Findings / Test Results:   Strokelike symptoms per neurology reviewed MRI imaging okay to discharge from their perspective    Incidental Findings:   Bilateral ICA moderate to severe stenosis  I reviewed the above mentioned incidental findings with the patient and/or family and they expressed understanding. Test Results Pending at Discharge (will require follow up):   Pending final read for MRI brain read by neurology prior to discharge     Outpatient Tests Requested:  Prior neurology  Per vascular    Complications: None    Reason for Admission: Strokelike symptoms    Hospital Course:   Olivia Wasserman is a 76 y.o. female patient who originally presented to the hospital on 12/8/2023 due to past medical history hypertension gout diabetes senting with numbness tingling of left side of her face arm and leg started yesterday morning around 8 AM prompting her to come to the ED where she was called as a stroke alert CTA head and neck no evidence of acute infarct have evidence to moderate to severe bilateral ICA stenosis. CTA stroke alert head negative. Is admitted under stroke pathway per neurology started on dual antiplatelet therapy and a statin.   RI brain that was ordered reviewed by neurology cleared patient for discharge recommended dual antiplatelet therapy for 21 days then aspirin alone thereafter continue with statin. Patient will need outpatient follow-up with PCP for tight blood pressure control, vascular and neurology. Patient deemed medically stable for discharge. Please see above list of diagnoses and related plan for additional information. Condition at Discharge: good    Discharge Day Visit / Exam:   Subjective: Patient reporting improvement of her symptoms however does continue to have some mild numbness on the left side of her body when walking states she it is like sandpaper. Vitals: Blood Pressure: 164/69 (12/09/23 0741)  Pulse: 95 (12/09/23 0741)  Temperature: 98.3 °F (36.8 °C) (12/09/23 0741)  Temp Source: Oral (12/09/23 0300)  Respirations: 17 (12/09/23 0300)  Height: 5' (152.4 cm) (12/08/23 1552)  Weight - Scale: 78.9 kg (174 lb) (12/08/23 1552)  SpO2: 94 % (12/09/23 0741)  Exam:   Physical Exam  Vitals and nursing note reviewed. Constitutional:       General: She is not in acute distress. Appearance: She is well-developed. HENT:      Head: Normocephalic and atraumatic. Eyes:      Conjunctiva/sclera: Conjunctivae normal.   Cardiovascular:      Rate and Rhythm: Normal rate and regular rhythm. Heart sounds: No murmur heard. Pulmonary:      Effort: Pulmonary effort is normal. No respiratory distress. Breath sounds: Normal breath sounds. Abdominal:      Palpations: Abdomen is soft. Tenderness: There is no abdominal tenderness. Musculoskeletal:         General: No swelling. Cervical back: Neck supple. Skin:     General: Skin is warm and dry. Capillary Refill: Capillary refill takes less than 2 seconds. Neurological:      Mental Status: She is alert and oriented to person, place, and time. Psychiatric:         Mood and Affect: Mood normal.          Discussion with Family: Updated  () at bedside.     Discharge instructions/Information to patient and family:   See after visit summary for information provided to patient and family. Provisions for Follow-Up Care:  See after visit summary for information related to follow-up care and any pertinent home health orders. Mobility at time of Discharge:   Basic Mobility Inpatient Raw Score: 23  JH-HLM Goal: 7: Walk 25 feet or more  JH-HLM Achieved: 7: Walk 25 feet or more  HLM Goal achieved. Continue to encourage appropriate mobility. Disposition:   Home    Planned Readmission: none     Discharge Statement:  I spent 40 minutes discharging the patient. This time was spent on the day of discharge. I had direct contact with the patient on the day of discharge. Greater than 50% of the total time was spent examining patient, answering all patient questions, arranging and discussing plan of care with patient as well as directly providing post-discharge instructions. Additional time then spent on discharge activities. Discharge Medications:  See after visit summary for reconciled discharge medications provided to patient and/or family.       **Please Note: This note may have been constructed using a voice recognition system**

## 2023-12-09 NOTE — ASSESSMENT & PLAN NOTE
Patient does have a history of gout and arthritis but patient states that she is on no medications for the same and she feels quite okay  Continue Tylenol as needed

## 2023-12-09 NOTE — ASSESSMENT & PLAN NOTE
I also discussed with this patient starting a home blood pressure managing program on a daily basis. She can discuss her readings with her PCP our goal is 140/80 or under for the most part.

## 2023-12-09 NOTE — ASSESSMENT & PLAN NOTE
Patient was on valsartan hydrochlorothiazide at home  Resume home diovan will give dose of auto sub losartan 100 mg prior to discharge  Out patient follow up with pcp     Blood pressure 164/69, pulse 95, temperature 98.3 °F (36.8 °C), resp. rate 17, height 5' (1.524 m), weight 78.9 kg (174 lb), SpO2 94 %.

## 2023-12-09 NOTE — PLAN OF CARE
Problem: Neurological Deficit  Goal: Neurological status is stable or improving  Description: Interventions:  - Monitor and assess patient's level of consciousness, motor function, sensory function, and level of assistance needed for ADLs. - Monitor and report changes from baseline. Collaborate with interdisciplinary team to initiate plan and implement interventions as ordered. - Provide and maintain a safe environment. - Consider seizure precautions. - Consider fall precautions. - Consider aspiration precautions. - Consider bleeding precautions.   Outcome: Progressing     Problem: SAFETY ADULT  Goal: Patient will remain free of falls  Description: INTERVENTIONS:  - Educate patient/family on patient safety including physical limitations  - Instruct patient to call for assistance with activity   - Consult OT/PT to assist with strengthening/mobility   - Keep Call bell within reach  - Keep bed low and locked with side rails adjusted as appropriate  - Keep care items and personal belongings within reach  - Initiate and maintain comfort rounds  - Make Fall Risk Sign visible to staff  - Offer Toileting every  Hours, in advance of need  - Initiate/Maintain alarm  - Obtain necessary fall risk management equipment:   - Apply yellow socks and bracelet for high fall risk patients  - Consider moving patient to room near nurses station  Outcome: Progressing     Problem: INFECTION - ADULT  Goal: Absence or prevention of progression during hospitalization  Description: INTERVENTIONS:  - Assess and monitor for signs and symptoms of infection  - Monitor lab/diagnostic results  - Monitor all insertion sites, i.e. indwelling lines, tubes, and drains  - Monitor endotracheal if appropriate and nasal secretions for changes in amount and color  - Monument appropriate cooling/warming therapies per order  - Administer medications as ordered  - Instruct and encourage patient and family to use good hand hygiene technique  - Identify and instruct in appropriate isolation precautions for identified infection/condition  Outcome: Progressing     Problem: PAIN - ADULT  Goal: Verbalizes/displays adequate comfort level or baseline comfort level  Description: Interventions:  - Encourage patient to monitor pain and request assistance  - Assess pain using appropriate pain scale  - Administer analgesics based on type and severity of pain and evaluate response  - Implement non-pharmacological measures as appropriate and evaluate response  - Consider cultural and social influences on pain and pain management  - Notify physician/advanced practitioner if interventions unsuccessful or patient reports new pain  Outcome: Progressing

## 2023-12-09 NOTE — PLAN OF CARE
Problem: OCCUPATIONAL THERAPY ADULT  Goal: Performs self-care activities at highest level of function for planned discharge setting. See evaluation for individualized goals. Description: Treatment Interventions: ADL retraining, Functional transfer training, UE strengthening/ROM, Endurance training, Patient/family training, Neuromuscular reeducation          See flowsheet documentation for full assessment, interventions and recommendations. Note: Limitation: Decreased ADL status, Decreased UE strength, Decreased endurance, Decreased self-care trans, Decreased high-level ADLs, Decreased sensation  Prognosis: Good  Assessment: Patient is a 76 y.o. female seen for OT evaluation s/p admit to [facility]  on 12/8/2023 w/Stroke-like symptoms (numbness). Commorbidities affecting patient's functional performance at time of assessment include: HTN, gout, and DM2. Orders placed for OT evaluation and treatment and up and OOB as tolerated . Performed at least two patient identifiers during session including name and wristband. Prior to admission, Patient reporting being independent with ADLs/IADLs, ambulatory with no AD, and lives with . Personal factors affecting patient at time of initial evaluation include: steps to enter, difficulty performing ADLs, and difficulty performing IADLs. Upon evaluation, patient requires supervision assist for UB ADLs, minimal  assist for LB ADLs, transfers and functional ambulation in room and bathroom with supervision and contact guard assist, with the use of  no AD . Patient is oriented x 4. Occupational performance is affected by the following deficits: decreased activity tolerance, impaired sensation, increased pain, and delayed righting and equilibrium reactions. Patient to benefit from continued Occupational Therapy treatment while in the hospital to address deficits as defined above and maximize level of functional independence with ADLs and functional mobility. Occupational Performance areas to address include: bathing/ shower, dressing, toilet hygiene, transfer to all surfaces, functional ambulation, medication routine/ management, IADLS: Household maintenance, IADLs: safety procedures, and IADLs: meal prep/ clean up. From OT standpoint, recommendation at time of d/c would be Level III (minimim resource intensity).      Rehab Resource Intensity Level, OT: III (Minimum Resource Intensity)        Erin WHITEHEAD, OTR/L

## 2023-12-09 NOTE — UTILIZATION REVIEW
Initial Clinical Review    Admission: Date/Time/Statement:   Admission Orders (From admission, onward)       Ordered        12/08/23 1041  Place in Observation  Once                          Orders Placed This Encounter   Procedures    Place in Observation     Standing Status:   Standing     Number of Occurrences:   1     Order Specific Question:   Level of Care     Answer:   Med Surg [16]     ED Arrival Information       Expected   -    Arrival   12/8/2023 09:24    Acuity   Emergent              Means of arrival   Wheelchair    Escorted by   Family Member    Service   Hospitalist    Admission type   Emergency              Arrival complaint   NUMBNESS             Chief Complaint   Patient presents with    Numbness     Pt c/o pins and needles feeling on her left arm which started this morning, pt also c/o lightheadedness        Initial Presentation: 76 y.o. female to ED presents for Left-sided numbness. Pt states that she got up from sleep at 8 AM this morning and the left side of her face left hand and left leg felt numb. States  that over time the numbness in the leg got better and the hands decreased but is still present but the face numbness persists. She did have slurring of speech for 1 minute. PMH for HTN, DM, and dyslipidemia and has not been taking her medications properly. Noncompliance with her med regimen. Admit Observation level of care for Stroke-like symptoms. NIH of 1. Stroke workup. Pt on dual antiplatelet therapy for 21 days, with plan for monotherapy after that and high intensity statins. MRI Brain. Neurology consult. 12/8  Neurology cons; Stroke-like symptoms. Per pt, she woke this am around 0800 with "pins and needles" on her entire left side; face, arm, and leg. She additionally felt lightheaded and off balance without focal weakness. She also felt that her speech was slurred for about 1 minute and resolved spontaneously without intervention. NIHSS 1 for left sided sensory deficit.  BP 177/80. Not a candidate for TNK or thrombectomy. Stroke workup. Stroke vs TIA. MRI Brain. Echo. Lipid Panel. HgbA1c. TSH. DAPT with asa 81mg and plavix 75mg daily for 21 days then asa monotherapy. Statin daily. Tele monitoring. Permissive HTN, labetalol if SBP >200. Frequent neuro checks. ED Triage Vitals   Temperature Pulse Respirations Blood Pressure SpO2   12/08/23 0934 12/08/23 0934 12/08/23 0934 12/08/23 0934 12/08/23 0934   98.4 °F (36.9 °C) 90 18 (!) 181/86 97 %      Temp Source Heart Rate Source Patient Position - Orthostatic VS BP Location FiO2 (%)   12/08/23 0934 12/08/23 0934 12/08/23 0934 12/08/23 0934 --   Oral Monitor Sitting Left arm       Pain Score       12/08/23 1127       No Pain          Wt Readings from Last 1 Encounters:   12/08/23 78.9 kg (174 lb)     Additional Vital Signs:   12/09/23 0836 -- -- -- -- -- -- None (Room air) --   12/09/23 07:41:16 98.3 °F (36.8 °C) 95 -- 164/69 101 94 % None (Room air) --   12/09/23 0700 -- -- -- -- -- -- None (Room air) --   12/09/23 0300 98.2 °F (36.8 °C) 90 17 175/77 Abnormal  110 93 % None (Room air) Lying   12/09/23 0100 97.5 °F (36.4 °C) 83 18 163/80 108 94 % None (Room air)      12/08/23 20:32:55 98.1 °F (36.7 °C) 98 19 157/112 Abnormal  127 95 % None (Room air) Lying   12/08/23 2000 -- -- -- 195/87 Abnormal  125 -- -- --   12/08/23 1930 -- 96 22 170/76 109 94 % -- --   12/08/23 1900 -- 89 21 138/65 93 93 % None (Room air) Lying   12/08/23 1836 -- 102 18 145/96 -- 97 % None (Room air)      Pertinent Labs/Diagnostic Test Results:   CTA stroke alert (head/neck)   Final Result by Harvinder Holden MD (12/08 1034)      1. No intracranial large vessel occlusion. 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 2.  Anatomic variant separate origins of left internal and external carotid arteries. Also normal variant retropharyngeal course of internal carotid arteries.    3.  No hemodynamically significant stenosis of the cervical carotid and vertebral arteries. Findings were directly discussed with Tess Martínez at 10:06 a.m. Workstation performed: [id number]         CT stroke alert brain   Final Result by Harvinder Holden MD (12/08 1034)      No acute intracranial CT abnormality. Findings were directly discussed with Tess Martínez at 10:06 a.m.          Workstation performed: [id number]         MRI brain wo contrast    (Results Pending)     Results from last 7 days   Lab Units 12/08/23  1006   SARS-COV-2  Negative     Results from last 7 days   Lab Units 12/09/23  0449 12/08/23  1006   WBC Thousand/uL 7.03 6.82   HEMOGLOBIN g/dL 12.4 14.0   HEMATOCRIT % 39.9 45.8   PLATELETS Thousands/uL 297 353   NEUTROS ABS Thousands/µL 3.85  --          Results from last 7 days   Lab Units 12/09/23  0449 12/08/23  1006   SODIUM mmol/L 137 139   POTASSIUM mmol/L 3.7 4.3   CHLORIDE mmol/L 105 104   CO2 mmol/L 24 26   ANION GAP mmol/L 8 9   BUN mg/dL 16 11   CREATININE mg/dL 0.78 0.83   EGFR ml/min/1.73sq m 78 72   CALCIUM mg/dL 8.9 9.4         Results from last 7 days   Lab Units 12/09/23  0740 12/08/23  2141 12/08/23  1619 12/08/23  0944   POC GLUCOSE mg/dl 181* 161* 127 178*     Results from last 7 days   Lab Units 12/09/23  0449 12/08/23  1006   GLUCOSE RANDOM mg/dL 164* 184*       Results from last 7 days   Lab Units 12/08/23  1458 12/08/23  1224 12/08/23  1006   HS TNI 0HR ng/L  --   --  8   HS TNI 2HR ng/L  --  7  --    HSTNI D2 ng/L  --  -1  --    HS TNI 4HR ng/L 4  --   --    HSTNI D4 ng/L -4  --   --          Results from last 7 days   Lab Units 12/08/23  1006   PROTIME seconds 13.3   INR  0.96   PTT seconds 26       Results from last 7 days   Lab Units 12/08/23  1006   INFLUENZA A PCR  Negative   INFLUENZA B PCR  Negative   RSV PCR  Negative       ED Treatment:   Medication Administration from 12/08/2023 0924 to 12/08/2023 2032         Date/Time Order Dose Route Action     12/08/2023 0959 EST iohexol (OMNIPAQUE) 350 MG/ML injection (MULTI-DOSE) 85 mL 85 mL Intravenous Given     12/08/2023 1057 EST aspirin tablet 325 mg 325 mg Oral Given     12/08/2023 1057 EST clopidogrel (PLAVIX) tablet 300 mg 300 mg Oral Given     12/08/2023 1326 EST cyanocobalamin (VITAMIN B-12) tablet 1,000 mcg 1,000 mcg Oral Given     12/08/2023 1723 EST atorvastatin (LIPITOR) tablet 40 mg 40 mg Oral Given     12/08/2023 1326 EST enoxaparin (LOVENOX) subcutaneous injection 40 mg 40 mg Subcutaneous Given     12/08/2023 1553 EST perflutren lipid microsphere (DEFINITY) injection 2 mL/min Intravenous Given          Past Medical History:   Diagnosis Date    Diabetes mellitus ([address])     Hyperlipidemia     Hypertension      Present on Admission:   Stroke-like symptoms   Benign essential hypertension   Gout   Type 2 diabetes mellitus with diabetic cataract ([facility])      Admitting Diagnosis: Numbness [R20.0]  Gout [M10.9]  Type 2 diabetes mellitus with diabetic cataract ([address]) [E11.36]  Age/Sex: 76 y.o. female    Admission Orders:  Scheduled Medications:  aspirin, 81 mg, Oral, Daily  atorvastatin, 40 mg, Oral, QPM  clopidogrel, 75 mg, Oral, Daily  cyanocobalamin, 1,000 mcg, Oral, Daily  enoxaparin, 40 mg, Subcutaneous, Daily  hydrOXYzine HCL, 50 mg, Oral, HS  insulin lispro, 1-5 Units, Subcutaneous, TID AC  insulin lispro, 1-5 Units, Subcutaneous, HS      Continuous IV Infusions: None     PRN Meds:  acetaminophen, 650 mg, Oral, Q6H PRN  aluminum-magnesium hydroxide-simethicone, 30 mL, Oral, Q6H PRN  magnesium hydroxide, 30 mL, Oral, Daily PRN  ondansetron, 4 mg, Intravenous, Q6H PRN        IP CONSULT TO NEUROLOGY  IP CONSULT TO CASE MANAGEMENT  IP CONSULT TO NUTRITION SERVICES    Network Utilization Review Department  ATTENTION: Please call with any questions or concerns to [phone] and carefully listen to the prompts so that you are directed to the right person.  All voicemails are confidential.   For Discharge needs, contact Care Management DC Support Team at [phone] opt. [phone]  Send all requests for admission clinical reviews, approved or denied determinations and any other requests to dedicated fax number below belonging to the campus where the patient is receiving treatment.  List of dedicated fax numbers for the Facilities:  [place] DENIALS (Administrative/Medical Necessity) [phone]   DISCHARGE SUPPORT TEAM (NETWORK) [id number] [facility] ([facility]/P[facility]s) [phone]   [address] [facility] [address] [facility] [phone]   [facility] [address] [place] [address] [address] [address] [facility] [address] [phone]

## 2023-12-09 NOTE — OCCUPATIONAL THERAPY NOTE
Occupational Therapy Evaluation      Misa Sheth    2023    Patient Active Problem List   Diagnosis    Calcific tendonitis of right shoulder    Ankylosing hyperostosis    Benign essential hypertension    Current moderate episode of major depressive disorder (HCC)    Food insecurity    Gout    Hypertension goal BP (blood pressure) < 130/80    Impingement syndrome of shoulder region    Long term current use of insulin (HCC)    Mixed hyperlipidemia    Moderate episode of recurrent major depressive disorder (HCC)    Neck pain    Shoulder pain    Type 2 diabetes mellitus with diabetic cataract ([address])    Stroke-like symptoms       Past Medical History:   Diagnosis Date    Diabetes mellitus ([address])     Hyperlipidemia     Hypertension        Past Surgical History:   Procedure Laterality Date     SECTION      HYSTERECTOMY      TONSILLECTOMY          23   OT Last Visit   OT Visit Date 23   Note Type   Note type Evaluation   Additional Comments Pt agreeable to OT eval. Upon arrival pt supine in bed with HOB elevated. Pain Assessment   Pain Assessment Tool 0-10   Pain Score 4   Pain Location/Orientation Location: Head   Pain Onset/Description Onset: Ongoing; Descriptor: Headache   Hospital Pain Intervention(s) Ambulation/increased activity;Repositioned;Medication (See MAR)   Restrictions/Precautions   Weight Bearing Precautions Per Order No   Other Precautions Telemetry; Fall Risk;Pain   Home Living   Type of [address] [place] [address] Two level;Bed/bath upstairs;Stairs to enter without rails  (1 MARKO; FOS to 2nd floor)   Bathroom Shower/Tub Walk-in shower   Bathroom Toilet Standard   [address]   (no AD used at baseline)   Prior Function   Level of [place] Independent with ADLs; Independent with functional mobility; Independent with IADLS   Lives With Spouse   Receives Help From Family   IADLs Independent with driving; Independent with medication management; Independent with meal prep   Falls in the last 6 months 0   Vocational Part time employment   Lifestyle   Autonomy Patient reporting being independent with ADLs/IADLs, ambulatory with no AD, and lives with    Reciprocal Relationships    Service to Others Works at [address] 6  Modified independent   [address] [facility] 6  [address] [place]   [address]  [address] [facility] 4  [address] [facility] 5  [address] [facility] 4  [address]  5  Supervision/Setup   Additional Comments ADL levels based on functional performance during OT eval.   Bed Mobility   Supine to Sit 5  Supervision   Additional items Assist x 1;HOB elevated; Bedrails   Sit to Supine   (DNT: pt seated at EOB at end of session)   Additional Comments Pt denied lightheaded/dizziness with sitting at EOB   Transfers   Sit to Stand 5  Supervision   Additional items Assist x 1; Increased time required;Verbal cues   Stand to Sit 5  Supervision   Additional items Assist x 1;Bedrails; Increased time required   Functional Mobility   Functional Mobility   (CGA)   Additional Comments Pt ambulated community distance in hallway with no overt SOB. Pt grossly unsteady d/t LLE sensation impairment. Pt reported (+) dizziness naviagating stairs but symptoms passed quickly   Balance   Static Sitting Good   Dynamic Sitting Fair +   Static Standing Fair +   Dynamic Standing Fair   Activity Tolerance   Activity Tolerance Patient tolerated treatment well   Medical Staff Made Aware Pt seen as a co-eval with PT due to the patient's co-morbidities and clinically unstable presentation indicated by chart review.    RUE Assessment   RUE Assessment WFL  (full AROM, 4+/5 MMT)   LUE Assessment   LUE Assessment WFL  (full AROM, 4+/5 MMT)   Hand Function   Gross Motor Coordination Functional Fine Motor Coordination Functional   Sensation   Light Touch Partial deficits in the LUE;Severe deficits in the LLE  ("pins & needles" and "yucky" feeling throughout L side)   Vision-Basic Assessment   Current Vision Wears glasses only for reading   Patient Visual Report   (no acute visual changes reported)   Vision - Complex Assessment   Acuity Able to read clock/calendar on wall without difficulty; Able to read employee name badge without difficulty   Cognition   Overall Cognitive Status [facility]   Arousal/Participation Alert; Responsive; Cooperative   Attention Within functional limits   Orientation Level Oriented X4   Memory Within functional limits   Following Commands Follows all commands and directions without difficulty   Assessment   Limitation Decreased ADL status; Decreased UE strength;Decreased endurance;Decreased self-care trans;Decreased high-level ADLs; Decreased sensation   Prognosis Good   Assessment Patient is a 76 y.o. female seen for OT evaluation s/p admit to [facility]  on 12/8/2023 w/Stroke-like symptoms (numbness). Commorbidities affecting patient's functional performance at time of assessment include: HTN, gout, and DM2. Orders placed for OT evaluation and treatment and up and OOB as tolerated . Performed at least two patient identifiers during session including name and wristband. Prior to admission, Patient reporting being independent with ADLs/IADLs, ambulatory with no AD, and lives with . Personal factors affecting patient at time of initial evaluation include: steps to enter, difficulty performing ADLs, and difficulty performing IADLs. Upon evaluation, patient requires supervision assist for UB ADLs, minimal  assist for LB ADLs, transfers and functional ambulation in room and bathroom with supervision and contact guard assist, with the use of  no AD . Patient is oriented x 4.   Occupational performance is affected by the following deficits: decreased activity tolerance, impaired sensation, increased pain, and delayed righting and equilibrium reactions. Patient to benefit from continued Occupational Therapy treatment while in the hospital to address deficits as defined above and maximize level of functional independence with ADLs and functional mobility. Occupational Performance areas to address include: bathing/ shower, dressing, toilet hygiene, transfer to all surfaces, functional ambulation, medication routine/ management, IADLS: Household maintenance, IADLs: safety procedures, and IADLs: meal prep/ clean up. From OT standpoint, recommendation at time of d/c would be Level III (minimim resource intensity). Goals   Patient Goals to get better   Plan   Treatment Interventions ADL retraining;Functional transfer training;UE strengthening/ROM; Endurance training;Patient/family training;Neuromuscular reeducation   Goal Expiration Date 12/19/23   OT Treatment Day 0   OT Frequency 1-2x/wk   Discharge Recommendation   Rehab Resource Intensity Level, OT III (Minimum Resource Intensity)   AM-PAC Daily Activity Inpatient   Lower Body Dressing 3   Bathing 3   Toileting 3   Upper Body Dressing 4   Grooming 4   Eating 4   Daily Activity Raw Score 21   Daily Activity Standardized Score (Calc for Raw Score >=11) 44.27   AM-PAC Applied Cognition Inpatient   Following a Speech/Presentation 4   Understanding Ordinary Conversation 4   Taking Medications 4   Remembering Where Things Are Placed or Put Away 4   Remembering List of 4-5 Errands 4   Taking Care of Complicated Tasks 4   Applied Cognition Raw Score 24   Applied Cognition Standardized Score 62.21   Modified Murdock Scale   Modified Murdock Scale 3   End of Consult   Patient Position at End of Consult Seated edge of bed; All needs within reach   Nurse Communication Nurse aware of consult     GOALS:    *ADL transfers with (I) for inc'd independence with ADLs/purposeful tasks    *UB ADL with (I) for inc'd independence with self cares    *LB ADL with (I) using AE prn for inc'd independence with self cares    *Toileting with (I) for clothing management and hygiene for return to PLOF with personal care    *Increase stand tolerance x 5  m for inc'd tolerance with standing purposeful tasks    *Participate in 10m UE therex to increase overall stamina/activity tolerance for purposeful tasks    *Bed mobility- (I) for inc'd independence to manage own comfort and initiate EOB & OOB purposeful tasks    *Patient will verbalize 3 safety awareness/ principles to prevent falls in the home setting. *Patient will increase OOB/sitting tolerance to 2-4 hours per day to increase participation in self-care and leisure tasks with no s/s of exertion.      Camelia Woo, OTD, OTR/L

## 2023-12-09 NOTE — ASSESSMENT & PLAN NOTE
Lab Results   Component Value Date    HGBA1C 7.2 (H) 02/13/2023       Recent Labs     12/08/23  0944 12/08/23  1619 12/08/23  2141 12/09/23  0740   POCGLU 178* 127 161* 181*         Blood Sugar Average: Last 72 hrs:  (P) 161.75    Hold oral medications and GLP-1 analog injections,   Continue SSI and blood glucose monitoring while in hospital

## 2023-12-09 NOTE — PLAN OF CARE
Problem: PHYSICAL THERAPY ADULT  Goal: Performs mobility at highest level of function for planned discharge setting. See evaluation for individualized goals. Description: Treatment/Interventions: Functional transfer training, LE strengthening/ROM, Elevations, Therapeutic exercise, Endurance training, Gait training, Patient/family training, Spoke to nursing, OT  Equipment Recommended: Alexandru Parker       See flowsheet documentation for full assessment, interventions and recommendations. Outcome: Progressing  Note: Prognosis: Good  Problem List: Impaired balance, Decreased mobility, Impaired sensation, Pain  Assessment: Pt is 76 y.o. female seen for PT evaluation s/p admit to [address] [place] on 12/8/2023 w/ Right pontine stroke ([address] PT consulted to assess pt's functional mobility and d/c needs. Order placed for PT eval and tx, w/  activity  order. Comorbidities affecting pt's physical performance at time of assessment include: HTN, T2 DM, small R pontine stroke with residual dysesthesias. PTA, pt was independent w/ all functional mobility w/ no AD . Personal factors affecting pt at time of IE include: lives in one story house, stairs to enter home, and good support at home . Please find objective findings from PT assessment regarding body systems outlined above with impairments and limitations including weakness, impaired balance, gait deviations, altered sensation, and impairment with stair navigation recommending step to pattern on descent. The following objective measures performed on IE also reveal limitations: -PAC 6-Clicks: 24/98. Pt's clinical presentation is currently evolving seen in pt's presentation. Pt to benefit from continued PT tx to address deficits as defined above and maximize level of functional independent mobility and consistency. From PT/mobility standpoint, recommendation at time of d/c would be Minimum Resource Intensity pending progress in order to facilitate return to PLOF.   Barriers to Discharge: Inaccessible home environment, Decreased caregiver support     Rehab Resource Intensity Level, PT: III (Minimum Resource Intensity)    See flowsheet documentation for full assessment.

## 2023-12-09 NOTE — ASSESSMENT & PLAN NOTE
This is a 80-year-old female with significant history of hypertension, diabetes and dyslipidemia came in with significant history of pins and needle sensation on the left side of the face when she got up this morning around 8 AM.    Symptoms of numbness and slurred speech present on admission improving  Initiated on stroke pathway  CTA head/neck noting moderate to severe stenosis of bilateral ICA; no evidence of stroke CT head negative  Neurology consulted  Continue DAPT and statin; 21 days asa and plavix 21 days then asa continue statin; tight bp control.  Resume home bp med and follow up with pcp   NIH scale of 1  MRI brain pending per neurology reviewed imaging patient stable from their perspective for discharge  Recommend follow up with pcp, vascular,and neurology   PT/OT/ST evaluation good for discharge home with out patient PT/OT

## 2023-12-09 NOTE — PROGRESS NOTES
[address] [facility]  Neurology progress Note - Name: Cande Valencia  MRN: [id number] Unit/Bed#: -01 I   Date of Admission: 12/8/2023  Date of Service: 12/9/2023 I Hospital Day: 0    Assessment/Plan   * Small right pontine stroke with residual dysesthesias  Assessment & Plan  12/9/2023: Seen again today by same neurology team is this right-hand-dominant 76 y.o. female with no prior neurologic history however she does have a traditional history of HTN, DM, HLD, and self-reported medication non-compliance who presented to [facility] ED 12/8/23 with stroke like symptoms. Patient reports that she woke up this morning around 0800 with "pins and needles" on her entire left side; face, arm, and leg. She additionally felt lightheaded and off balance without focal weakness. She also felt that her speech was slurred for about 1 minute and resolved spontaneously without intervention. No vision changes reported. Stroke alert initiated with NIHSS 1 for left sided sensory deficit. /80, . CTH/CTA without acute intracranial findings. Not a candidate for TNK or thrombectomy. Admitted on stroke pathway for additional work-up  On exam today she reports a diminished asymmetry in her face for the sensory symptoms however she reports she still has much of the same dysesthesia in the left arm and the left leg and foot. She otherwise reports that she feels well and her exam was otherwise nonfocal.  Workup and imaging:  CTH:No acute intracranial CT abnormality. CTA:1. No intracranial large vessel occlusion. 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 2.  Anatomic variant separate origins of left internal and external carotid arteries. Also normal variant retropharyngeal course of internal carotid arteries. 3.  No hemodynamically significant stenosis of the cervical carotid and vertebral arteries.   MRI: Notes minimal small vessel disease she had a small right midline pontine infarct appreciated. Recommendations:  Loaded with asa 325mg and plavix 300mg, now to be on daily asa 81mg and 21 days only of plavix 75mg daily   Medical management and supportive care per primary outpatient team.   She needs to schedule an outpatient neurology appointment. She can be seen by our [facility] neurology office nonurgently. I also discussed with this patient in a home blood pressure management program as well as maintaining her prescribed meds on a daily basis. Type 2 diabetes mellitus with diabetic cataract ([address])  Assessment & Plan  Her A1c is mildly elevated. We discussed with her risk factor control medication and management. Benign essential hypertension  Assessment & Plan  I also discussed with this patient starting a home blood pressure managing program on a daily basis. She can discuss her readings with her PCP our goal is 140/80 or under for the most part. This patient needs to be seen by our outpatient neurology service. She can be seen here in our [place] office by any of our providers at that location within the next month or 2. She should remain on her aspirin and Lipitor daily with antihypertensives as well. She should also be on 21 days of Plavix. She should go back and see her PCP as well as make an appointment with vascular surgery as well. Subjective/Objective     Subjective: I feel really good today in the funny sensation has left my face but it still weird when I walk and I can still feel it in my arm    ROS: As noted above the patient reports she still has the dysesthesia in the left leg arm as well as only minimally in the face. She reports she otherwise feels well and denied all with a guided query.     Current Facility-Administered Medications   Medication Dose Route Frequency    acetaminophen  650 mg Oral Q6H PRN    aluminum-magnesium hydroxide-simethicone  30 mL Oral Q6H PRN    aspirin  81 mg Oral Daily    atorvastatin  40 mg Oral QPM clopidogrel  75 mg Oral Daily    cyanocobalamin  1,000 mcg Oral Daily    enoxaparin  40 mg Subcutaneous Daily    hydrOXYzine HCL  50 mg Oral HS    insulin lispro  1-5 Units Subcutaneous TID AC    insulin lispro  1-5 Units Subcutaneous HS    losartan  100 mg Oral Daily    magnesium hydroxide  30 mL Oral Daily PRN    ondansetron  4 mg Intravenous Q6H PRN       acetaminophen    aluminum-magnesium hydroxide-simethicone    magnesium hydroxide    ondansetron    Vitals: Blood pressure 164/69, pulse 95, temperature 98.3 °F (36.8 °C), resp. rate 17, height 5' (1.524 m), weight 78.9 kg (174 lb), SpO2 94 %. ,Body mass index is 33.98 kg/m². Physical Exam:     Erlinda Reagan seen in: in bed,  at the bedside as well  General appearance: alert,   Neck, Lungs, Heart, & abdomen: WNL  Extremities: atraumatic, no cyanosis or edema    Neurologic:   Mental status: Alert, oriented, thought content appropriate, she spontaneously conversant no language or speech dysfunction. CN: exam EOM's I, Gaze conjugate. Non lateralizing sensory & motor exam, (PP not tested on face). Reminder CNVIII-XII normal.   Motor: full power age appropriate x 4 limbs  Sensory: grossly intact  X 4 limbs, PP tested symmetrically however she still has the overlay of the sandpaper particularly more in her left leg than her left arm and she reports he dysesthesia also in her face has resolved. Cerebellar: no ataxia or past pointing w pronation from a modified Romberg position. Finger taps age appropriate. Gait: Fluid smooth, no LOB w cadance or directional change. DTR's: Age appropriate,  Plantars: Not tested on today's exam      Lab Results: I have personally reviewed pertinent reports.   , CBC:   Results from last 7 days   Lab Units 12/09/23  0449 12/08/23  1006   WBC Thousand/uL 7.03 6.82   RBC Million/uL 4.83 5.52*   HEMOGLOBIN g/dL 12.4 14.0   HEMATOCRIT % 39.9 45.8   MCV fL 83 83   PLATELETS Thousands/uL 297 353   , BMP/CMP:   Results from last 7 days Lab Units 12/09/23  0449 12/08/23  1006   SODIUM mmol/L 137 139   POTASSIUM mmol/L 3.7 4.3   CHLORIDE mmol/L 105 104   CO2 mmol/L 24 26   BUN mg/dL 16 11   CREATININE mg/dL 0.78 0.83   CALCIUM mg/dL 8.9 9.4   EGFR ml/min/1.73sq m 78 72   , Vitamin B12:   , HgBA1C:   , TSH:   , Coagulation:   Results from last 7 days   Lab Units 12/08/23  1006   INR  0.96   , Lipid Profile:   Results from last 7 days   Lab Units 12/09/23  0449   HDL mg/dL 40*   LDL CALC mg/dL 152*   TRIGLYCERIDES mg/dL 165*        Imaging Studies: I have personally reviewed pertinent films in PACS and and note the small right pontine infarct appreciated on the diffusion weighted imaging. She has very little vessel disease noted on the other films. This was reviewed also with the patient at the bedside. We also did discuss her carotid stenosis and she will be following up with vascular surgery as an outpatient    EEG, Echo, Pathology, and Other Studies: I have personally reviewed pertinent films in PACS she is noted to have an ejection fraction of only 45%. Counseling / Coordination of Care  Total time spent today approximately 50-60 total minutes. Greater than 50% of total time was spent with the patient and / or family counseling and / or coordination of care. A description of the counseling / coordination of care: All of the above was discussed and reviewed with this patient at the bedside as well as her . They both had several questions about medications follow-up care blood pressure readings at home etc.  I believe all of her questions were answered at this time.

## 2023-12-09 NOTE — UTILIZATION REVIEW
NOTIFICATION OF OBSERVATION ADMISSION   AUTHORIZATION REQUEST   SERVICING FACILITY:   [address] [place], [place] [address]Nasim Lord[address]  Tax ID: [id number]  NPI: [id number] ATTENDING PROVIDER:  Attending Name and NPI#: Juan Coats, [id number] [place] [address] [[id number]]  Address: Sindi Ochoa[address]  Phone: [phone]     ADMISSION INFORMATION:  Place of Service: On [facility] Code: 22 CPT Code:   Admitting Diagnosis Code/Description:  Numbness [R20.0]  Gout [M10.9]  Type 2 diabetes mellitus with diabetic cataract [facility]) [E11.36]  Observation Admission Date/Time: 12/8/23 @ 10:41 am  Discharge Date/Time: No discharge date for patient encounter. UTILIZATION REVIEW CONTACT:  Surya Langley Utilization   Network Utilization Review Department  Phone: [phone]  Fax [phone]  Email: [email or web address]  Contact for approvals/pending authorizations, clinical reviews, and discharge. PHYSICIAN ADVISORY SERVICES:  Medical Necessity Denial & Nrbg-ck-Ey[facility] Review  Phone: [phone]  Fax: [phone]  Email: [email or web address]     DISCHARGE SUPPORT TEAM:  For Patients Discharge Needs & Updates  Phone: [phone] opt. 2 Fax: [phone]  Email: [email or web address]

## 2023-12-09 NOTE — PHYSICAL THERAPY NOTE
Physical Therapy Evaluation     Patient's Name: Cezar Simeon    Admitting Diagnosis  Numbness [R20.0]  Gout [M10.9]  Type 2 diabetes mellitus with diabetic cataract ([address]) [E11.36]    Problem List  Patient Active Problem List   Diagnosis    Calcific tendonitis of right shoulder    Ankylosing hyperostosis    Benign essential hypertension    Current moderate episode of major depressive disorder (HCC)    Food insecurity    Gout    Hypertension goal BP (blood pressure) < 130/80    Impingement syndrome of shoulder region    Long term current use of insulin (HCC)    Mixed hyperlipidemia    Moderate episode of recurrent major depressive disorder (HCC)    Neck pain    Shoulder pain    Type 2 diabetes mellitus with diabetic cataract ([address])    Small right pontine stroke with residual dysesthesias       Past Medical History  Past Medical History:   Diagnosis Date    Diabetes mellitus ([address])     Hyperlipidemia     Hypertension        Past Surgical History  Past Surgical History:   Procedure Laterality Date     SECTION      HYSTERECTOMY      TONSILLECTOMY              23 09   PT Last Visit   PT Visit Date 23   Note Type   Note type Evaluation   Pain Assessment   Pain Assessment Tool 0-10   Pain Score 4   Pain Location/Orientation Location: Head   Restrictions/Precautions   Weight Bearing Precautions Per Order No   Other Precautions Telemetry; Fall Risk;Pain   Home Living   Type of [address] [place] [address] Two level;Bed/bath upstairs;Stairs to enter without rails  (1 MARKO)   Bathroom Shower/Tub Walk-in shower   Bathroom Toilet Standard   Bathroom Equipment Built-in shower seat   Additional Comments no AD at baseline   Prior Function   Level of [place] Independent with ADLs; Independent with functional mobility; Independent with IADLS   Lives With Spouse   Receives Help From Family   IADLs Independent with driving; Independent with medication management; Independent with meal prep   Falls in the last 6 months 0   Vocational Part time employment  ([employer])   Comments no changes in vision, equilibrium was off   Cognition   Overall Cognitive Status WFL   Arousal/Participation Alert   Attention Within functional limits   Orientation Level Oriented X4   Memory Within functional limits   Following Commands Follows all commands and directions without difficulty   Subjective   Subjective My leg just feels Yucky . RLE Assessment   RLE Assessment X   Strength RLE   R Hip Flexion 4+/5   R Knee Extension 4+/5   R Ankle Dorsiflexion 4+/5   LLE Assessment   LLE Assessment X   Strength LLE   L Hip Flexion 4+/5   L Knee Extension 4+/5   L Ankle Dorsiflexion 4+/5   Coordination   Sensation   (numbness in finger tips and LLE, "yucky scratchy feeling")   Light Touch   RLE Light Touch Grossly intact   LLE Light Touch Impaired   Bed Mobility   Supine to Sit 5  Supervision   Additional items HOB elevated   Additional Comments Pt denied lightheadedness/dizziness with sitting at EOB or walking. Justed noted impaired sensation on LLE that felt like it was throwing her off   Transfers   Sit to Stand 5  Supervision   Additional items Assist x 1; Increased time required;Verbal cues   Stand to Sit 5  Supervision   Additional items Assist x 1; Increased time required;Verbal cues   Additional Comments Pt. ambulated without AD, Decreased L stance during ambulation. Otherwise no s/sx of LOB througout IE. Ambulation/Elevation   Gait pattern Decreased foot clearance;Decreased L stance;Decreased heel strike;Decreased toe off;Step through pattern   Gait Assistance 5  Supervision   Additional items Assist x 1;Verbal cues   Assistive Device   (no AD)   Distance 90', 90'   Stair Management Assistance 5  Supervision   Additional items Assist x 1;Verbal cues; Tactile cues   Stair Management Technique One rail R  (alternating ascent, step to descent)   Number of Stairs 12   Balance   Static Sitting Good   Dynamic Sitting Fair +   Static Standing Fair + Dynamic Standing Fair   Ambulatory Fair   Activity Tolerance   Activity Tolerance Patient tolerated treatment well   Medical Staff Made Aware Pt seen as a co-eval with OT due to the patient's co-morbidities and clinically unstable presentation indicated by chart review. Assessment   Prognosis Good   Problem List Impaired balance;Decreased mobility; Impaired sensation;Pain   Assessment Pt is 76 y.o. female seen for PT evaluation s/p admit to [address] on 12/8/2023 w/ Right pontine stroke ([address] PT consulted to assess pt's functional mobility and d/c needs. Order placed for PT eval and tx, w/  activity  order. Comorbidities affecting pt's physical performance at time of assessment include: HTN, T2 DM, small R pontine stroke with residual dysesthesias. PTA, pt was independent w/ all functional mobility w/ no AD . Personal factors affecting pt at time of IE include: lives in one story house, stairs to enter home, and good support at home . Please find objective findings from PT assessment regarding body systems outlined above with impairments and limitations including weakness, impaired balance, gait deviations, altered sensation, and impairment with stair navigation recommending step to pattern on descent. The following objective measures performed on IE also reveal limitations: AM-PAC 6-Clicks: 56/02. Pt's clinical presentation is currently evolving seen in pt's presentation. Pt to benefit from continued PT tx to address deficits as defined above and maximize level of functional independent mobility and consistency. From PT/mobility standpoint, recommendation at time of d/c would be Minimum Resource Intensity pending progress in order to facilitate return to PLOF. Barriers to Discharge Inaccessible home environment;Decreased caregiver support   Goals   Patient Goals to get better   STG Expiration Date 12/23/23   Short Term Goal #1 1.  Pt will complete bed mobility with Mod I to increase functional mobility. 2. Pt will complete sit to stand transfers with Mod I to increase functional mobility. 3. Pt will ambulate 150 ft with no AD with Mod I without LOB 4. Pt will increase B/L LE strength by 1 grade to facilitate improved functional mobility with decreased risk of falls. 5. Pt will increase standing balance to fair in order to decrease risk of falls. 6. Pt. Will navigate stairs with Mod I with good sequencing and safety without cues. PT Treatment Day 0   Plan   Treatment/Interventions Functional transfer training;LE strengthening/ROM; Elevations; Therapeutic exercise; Endurance training;Gait training;Patient/family training;Spoke to nursing;OT   PT Frequency 2-3x/wk   Discharge Recommendation   Rehab Resource Intensity Level, PT III (Minimum Resource Intensity)   Equipment Recommended Walker   AM-PAC Basic Mobility Inpatient   Turning in Flat Bed Without Bedrails 4   Lying on Back to Sitting on Edge of Flat Bed Without Bedrails 4   Moving Bed to Chair 4   Standing Up From Chair Using Arms 4   Walk in Room 4   Climb 3-5 Stairs With Railing 3   Basic Mobility Inpatient Raw Score 23   Basic Mobility Standardized Score 50.88   Highest Level Of Mobility   JH-HLM Goal 7: Walk 25 feet or more   JH-HLM Achieved 7: Walk 25 feet or more       Dre Johnson, PT

## 2023-12-09 NOTE — CASE MANAGEMENT
Case Management Assessment & Discharge Planning Note    Patient name Chirag Doyle  Location [address] [facility] [place] 205/-83 MRN [id number]  : 1955 Date 2023       Current Admission Date: 2023  Current Admission Diagnosis:Stroke-like symptoms   Patient Active Problem List    Diagnosis Date Noted    Stroke-like symptoms 2023    Moderate episode of recurrent major depressive disorder ([address]) 2023    Hypertension goal BP (blood pressure) < 130/80 2022    Food insecurity 2021    Current moderate episode of major depressive disorder ([address]) 2021    Mixed hyperlipidemia 10/12/2020    Calcific tendonitis of right shoulder 2019    Ankylosing hyperostosis 06/15/2018    Benign essential hypertension 06/15/2018    Gout 06/15/2018    Long term current use of insulin ([address]) 06/15/2018    Impingement syndrome of shoulder region 2017    Neck pain 2017    Shoulder pain 2017    Type 2 diabetes mellitus with diabetic cataract ([address]) 10/20/2015      LOS (days): 0  Geometric Mean LOS (GMLOS) (days):   Days to GMLOS:     OBJECTIVE:              Current admission status: Observation       Preferred Pharmacy:   [facility] [address] [place] [address]  Phone: [phone] Fax: [phone]    Primary Care Provider: Flora Rey MD    Primary Insurance: [facility]  Secondary Insurance:     ASSESSMENT:  Alex Proxies    There are no active Health Care Proxies on file.        Advance Directives  Does patient have a Health Care POA?: No  Was patient offered paperwork?: Yes (CM supplied info)  Does patient currently have a Health Care decision maker?: Yes, please see Health Care Proxy section  Does patient have Advance Directives?: No  Was patient offered paperwork?: Yes (CM supplied info)  Primary Contact:  Saul Tian Notice Signed:  (not on workqueue)    Readmission Root Cause  30 Day Readmission: No    Patient Information  Admitted from[de-identified] Home  Mental Status: Alert  During Assessment patient was accompanied by: Spouse  Assessment information provided by[de-identified] Patient, Spouse  Primary Caregiver: Self  Support Systems: Spouse/significant other  [place] of Residence: [address] [place] [address] do you live in?: [facility] entry access options. Select all that apply.: Stairs  Number of steps to enter home. : 1  Do the steps have railings?: No  Type of Current Residence: 2 story home  Upon entering residence, is there a bedroom on the main floor (no further steps)?: No  A bedroom is located on the following floor levels of residence (select all that apply):: 2nd Floor  Upon entering residence, is there a bathroom on the main floor (no further steps)?: Yes  Number of steps to 2nd floor from main floor: One Flight  Living Arrangements: Lives w/ Spouse/significant other  Is patient a ?: No    Activities of Daily Living Prior to Admission  Functional Status: Independent  Completes ADLs independently?: Yes  Ambulates independently?: Yes  Does patient use assisted devices?: No  Does patient currently own DME?: No  Does patient have a history of Outpatient Therapy (PT/OT)?: No  Does the patient have a history of Short-Term Rehab?: No  Does patient have a history of HHC?: No  Does patient currently have [address] [facility]?: No         Patient Information Continued  Income Source: Employed  Does patient have prescription coverage?: Yes  Does patient receive dialysis treatments?: No  Does patient have a history of substance abuse?: No  Does patient have a history of Mental Health Diagnosis?: No    PHQ 2/9 Screening   Reviewed PHQ 2/9 Depression Screening Score?: No    Means of Transportation  Means of Transport to [facility][de-identified] Drives Self      Housing Stability: Not on file   Food Insecurity: Not on file   Transportation Needs: Not on file   Utilities: Not on file       DISCHARGE DETAILS:    Discharge planning discussed with[de-identified] patient and  Jacquelyn Davis  Freedom of Choice: Yes  Comments - Freedom of Choice: Pt works and does not plan on being on being homebound-no anticipated d/c needs  CM contacted family/caregiver?: Yes  Were Treatment Team discharge recommendations reviewed with patient/caregiver?: Yes  Did patient/caregiver verbalize understanding of patient care needs?: Yes  Were patient/caregiver advised of the risks associated with not following Treatment Team discharge recommendations?: Yes    Contacts  Patient Contacts: Jacquelyn Davis  Relationship to Patient[de-identified] Family  Contact Method:  In Person  Reason/Outcome: Continuity of Care, Discharge [facility]         Is the patient interested in [address] at discharge?: No    DME Referral Provided  Referral made for DME?: No    Other Referral/Resources/Interventions Provided:  Referral Comments: No anticipated d/c needs    Would you like to participate in our [facility] service program?  : No - Declined    Treatment Team Recommendation: Home  Discharge Destination Plan[de-identified] Home  Transport at Discharge : Family

## 2023-12-12 ENCOUNTER — TELEPHONE (OUTPATIENT)
Dept: NEUROLOGY | Facility: CLINIC | Age: 68
End: 2023-12-12

## 2023-12-12 NOTE — TELEPHONE ENCOUNTER
Hello,     Can you please advise which Speciality/Team and if patient can be seen by an Resident, AP and or Attending  only? Thank you for your time,     Richard Molina           She needs to schedule an outpatient neurology appointment.   She can be seen by our [facility] neurology office nonurgently       HFU/ SL / Small right pontine stroke with residual dysesthesias     DC- HOME- 12/09/2023

## 2023-12-12 NOTE — UTILIZATION REVIEW
NOTIFICATION OF ADMISSION DISCHARGE   This is a Notification of Discharge from [address] [facility]. Please be advised that this patient has been discharge from our facility. Below you will find the admission and discharge date and time including the patient’s disposition. UTILIZATION REVIEW CONTACT:  Dilan Simons  Utilization   Network Utilization Review Department  Phone: [phone] x carefully listen to the prompts. All voicemails are confidential.  Email: [email or web address]     ADMISSION INFORMATION  PRESENTATION DATE: 12/8/2023  9:38 AM  OBERVATION ADMISSION DATE: 12/8/23  INPATIENT ADMISSION DATE: N/A N/A   DISCHARGE DATE: 12/9/2023  5:15 PM   DISPOSITION:Home/Self Care    Network Utilization Review Department  ATTENTION: Please call with any questions or concerns to [phone] and carefully listen to the prompts so that you are directed to the right person. All voicemails are confidential.   For Discharge needs, contact Care Management DC Support Team at [phone] opt. 2  Send all requests for admission clinical reviews, approved or denied determinations and any other requests to dedicated fax number below belonging to the campus where the patient is receiving treatment.  List of dedicated fax numbers for the Facilities:  [place] DENIALS (Administrative/Medical Necessity) [phone]   DISCHARGE SUPPORT TEAM (Network) [phone] [facility] (Maternity/NICU/Pediatrics) [phone] [address] [facility] [address] [facility] [address] [place] [address] [address] [phone]

## 2023-12-12 NOTE — TELEPHONE ENCOUNTER
Patient is now scheduled with DR. Mari Estrada, HFU Slot 1 pn, 4/5/2024. Placed on the waiting list .[address], OFFERED A SOONER APPT BUT WAS ON A TUESDAY DECLINED         Olivia Palomino PA-C   to Me       12/12/23  2:19 PM   Patient can followup with neurovascular AP, resident, or attending physician.

## 2023-12-22 ENCOUNTER — EVALUATION (OUTPATIENT)
Age: 68
End: 2023-12-22
Payer: COMMERCIAL

## 2023-12-22 DIAGNOSIS — R20.0 NUMBNESS: Primary | ICD-10-CM

## 2023-12-22 PROCEDURE — 97530 THERAPEUTIC ACTIVITIES: CPT

## 2023-12-22 PROCEDURE — 97166 OT EVAL MOD COMPLEX 45 MIN: CPT

## 2023-12-22 NOTE — PROGRESS NOTES
"OCCUPATIONAL THERAPY INITIAL EVALUATION    Today's Date: 2023  Patient Name: Theresa Valerio  : 1955  MRN: [id number]  Referring Provider: Sherri Ochoa CRNP  Dx: Numbness [R20.0]    SKILLED ANALYSIS:  Pt is a right hand dominant 68 year old female presenting to OP OT s/p numbness secondary to stroke like symptoms.  Pt currently reports difficulty with sensation in face, L UE and LLE as well as distal weakness, memory difficulties and unsteady balance. Pt is demonstrating deficits based on clinical observation and the following assessments: JOHN hand  strength, pinch strength, MoCA, 9 hole peg test and L UE MMT. Post assessments pt demonstrating the following: decreased hand  strength, pincer, tripod and lateral pinch strength, L UE strength, fine motor coordination and cognitive skills. Recommend OP OT 1-2x/wk for 12 weeks with focus on aforementioned deficits to maximize functional performance and improve QOL.  Findings and recommendations discussed with pt, and they are in agreement. Educated pt on charges of insurance, POC, goal creation, and OP OT services.     POC expires Auth Status Total   Visits  Start date  Expiration date PT/OT + Visit Limit? Co-Insurance   3/22/24 required  23  BOMN No                                           Visit/Unit Tracking  AUTH Status:  Date               Visits  Authed:  Used 1               Remaining                  PLAN OF CARE START:23  PLAN OF CARE END: 3/22/2024  PROGRESS NOTE DUE:   FREQUENCY: 1-2 x's per week  PRECAUTIONS   DIAGNOSIS: Numbness (stroke like symptoms)  VISITS: 1 eval    Subjective    Mechanism of Injury  As per hospital neurology: \"2023: Seen again today by same neurology team is this right-hand-dominant 68 y.o. female with no prior neurologic history however she does have a traditional history of HTN, DM, HLD, and self-reported medication non-compliance who presented to [facility] ED 23 with stroke like " "symptoms. Patient reports that she woke up this morning around 0800 with \"pins and needles\" on her entire left side; face, arm, and leg. She additionally felt lightheaded and off balance without focal weakness. She also felt that her speech was slurred for about 1 minute and resolved spontaneously without intervention. No vision changes reported. Stroke alert initiated with NIHSS 1 for left sided sensory deficit. /80, . CTH/CTA without acute intracranial findings. Not a candidate for TNK or thrombectomy. Admitted on stroke pathway for additional work-up  On exam today she reports a diminished asymmetry in her face for the sensory symptoms however she reports she still has much of the same dysesthesia in the left arm and the left leg and foot.  She otherwise reports that she feels well and her exam was otherwise nonfocal.\"    Occupational Profile    Pt reports difficulties with strength, losing train of thought and balance. Pt is scheduled to return to work next week at [employer] as a  although hasn't driven since being in the hospital. Pt is independent in ADLs and iADL's. Pt reports sporadic back pain and left scapular discomfort.     PATIENT GOAL: \"To strengthen left side and gain confidence\"    HISTORY OF PRESENT ILLNESS:   Pt is a 68 y.o. female who was referred to Occupational Therapy s/p  Numbness [R20.0].     PMH:   Past Medical History:   Diagnosis Date    Diabetes mellitus (HCC)     Hyperlipidemia     Hypertension        Past Surgical Hx:   Past Surgical History:   Procedure Laterality Date     SECTION      HYSTERECTOMY      TONSILLECTOMY          Pain: Discomfort 2/10  Location:       Objective    Impairment Observations:    SARA WESTON Comments Status on IE           UPPER EXTREMITY FUNCTION   Intact Intact Dominant Hand: right include date     /PINCH STRENGTH              Dynamometer     - Gross Grasp 35 lbs 20 lbs abnormal  R UE NORM 50 lbs  L UE NORM 41 lbs    Pinch " Meter      - Pincer 6 lbs 5 lbs abnormal  UE NORM 11lbs     - Tripod 7 lbs 6 lbs abnormal  UE NORM 14lbs     - Lateral 6 lbs  8 lbs abnormal  UE NORM 14lbs      AROM (Seated)         WFL      MMT              Shoulder FF 5/5 5/5     Shoulder Ext 5/5 4-/5     Shoulder Abduction 5/5 4+/5     Shoulder Adduction 5/5 4+/5     Elbow Flex 5/5 4+/5     Elbow Ext 5/5 4+/5     Wrist Flex 5/5 4-/5     Wrist Ext 5/5 4+/5     Gross Grasp 4/5 4-/5       SENSATION         COORDINATION       Opposition WFL WFL     Finger to Nose WFL WFL     Rapid Alternating Movement WFL WFL     9 Hole Peg Test-  assesses dexterity/fine motor coordination  23 seconds 28 seconds abnormal    Pt demonstrates decreased FMC compared to norms for age/sex (20 seconds)     Fxnl Dexterity Test-assesses patient's ability to use the hand for daily tasks requiring a 3-jaw carlos prehension between the fingers and the thumb  32 seconds 35 seconds normal    Pt demonstrates decreased dexterity compared to norms for age/sex (35 seconds)          [place] Cognitive Assessment (MoCA)       SCORE Comments STATUS ON IE                                         COGNITIVE FXN                       MOCA (8.1)        Education Level = 12 years      Visuospatial/executive functioning 4/5      Naming 3/3      Memory: 1st trial:       Memory: 2nd trial:       Attention/concentration 2/2      List of letters:        Serial Seven Subtraction: 1/3      Language/sentence repetition: 1/2      Language Fluency:        Abstract/Correlational Thinkin/      Delayed Recall:       Orientation:       Memory Index Score 7/15                                         Total Score 30        MoCA Scoring        Normal: 26+         Mild Cognitive Impairment: 18-25          Moderate Cognitive Impairment: 10-17         Severe Cognitive Impairment: <10   Mild Cognitive Impairment          SHORT TERM GOALS (6-8 weeks)    GOAL  STATUS ON IE  GOAL STATUS    Pt will improve LUE   strength from 20 lbs to 25 lbs as evidenced by the JOHN hand  assessment to improve performance within daily routines and life roles.    20 lbs Established    Pt will improve L UE tripod pinch strength from 6 lbs to 8 lbs as evidenced by the JOHN pinch strength assessment to improve performance within daily routines and life roles.    6 lbs Estblished    Pt will improve R UE tripod pinch strength from 7 lbs to 9 lbs as evidenced by the JOHN pinch strength assessment to improve performance within daily routines and life roles.  7 lbs Established    Pt will improve L UE  fine motor coordination from 28s to WFLs as evidenced by the 9HPT to improve performance with with ADL and iADL occupations.    28 s Established    Pt will improve L UE proximal strength from 4-/5 to 5/5 to improve performance within return to work and IADL occupations.  4- Established    Pt will improve L UE distal strength from 4-/5 to 5/5 to improve performance within return to work and IADL occupations.  4- Established    Pt will improve delayed recall section of the MoCA from 1 to 3 to improve cognitive performance within daily routines and life roles.    1 Established    LONG TERM GOALS( > 8 weeks)    GOAL  STATUS ON IE  GOAL STATUS    Pt will improve LUE  strength from 20 lbs to 35 lbs as evidenced by the JOHN hand  assessment to improve performance within daily routines and life roles.  20 lbs Established    Pt will improve L UE tripod pinch strength from 6 lbs to 10  lbs as evidenced by the JOHN pinch strength assessment to improve performance within daily routines and life roles.  6 lbs Established    Pt will improve R UE tripod pinch strength from 7 lbs to 11 lbs as evidenced by the JOHN pinch strength assessment to improve performance within daily routines and life roles.  7 lbs Established    Pt will improve delayed recall section of the MoCA from 1 to 4 to improve cognitive performance within daily routines and  life roles.  1 Established    Pt will improve MoCA score from 20/30 to 26/30 to improve performance within return to work and iADL occupations.  20 Established            OTHER PLANNED THERAPY INTERVENTIONS:   Supine, seated, and in stance neuro re-ed  Tricep AG  NMES/FES  FMC/prehension  Timed Trials  Manual tx  Hand to target  Sensory re-ed  Seated functional reach: crossing midline  Supine place and hold  WBearing strategies   Closed chain activities  Open chain activities  Internal and external memory aides  Multimatrix for saccades/ visual clutter/attention  Hypersensitivity strategies education  Multi-modal environment  Sustained/alternating/divided attention

## 2024-01-04 ENCOUNTER — OFFICE VISIT (OUTPATIENT)
Dept: INTERNAL MEDICINE CLINIC | Facility: CLINIC | Age: 69
End: 2024-01-04
Payer: COMMERCIAL

## 2024-01-04 VITALS
DIASTOLIC BLOOD PRESSURE: 68 MMHG | BODY MASS INDEX: 34.63 KG/M2 | OXYGEN SATURATION: 96 % | WEIGHT: 176.4 LBS | HEART RATE: 101 BPM | SYSTOLIC BLOOD PRESSURE: 128 MMHG | HEIGHT: 60 IN

## 2024-01-04 DIAGNOSIS — I10 BENIGN ESSENTIAL HYPERTENSION: Primary | ICD-10-CM

## 2024-01-04 DIAGNOSIS — E66.9 OBESITY (BMI 30.0-34.9): ICD-10-CM

## 2024-01-04 DIAGNOSIS — E11.9 TYPE 2 DIABETES MELLITUS WITHOUT COMPLICATION, WITHOUT LONG-TERM CURRENT USE OF INSULIN (HCC): ICD-10-CM

## 2024-01-04 DIAGNOSIS — H91.8X2 OTHER SPECIFIED HEARING LOSS OF LEFT EAR, UNSPECIFIED HEARING STATUS ON CONTRALATERAL SIDE: ICD-10-CM

## 2024-01-04 DIAGNOSIS — H91.93 DECREASED HEARING OF BOTH EARS: ICD-10-CM

## 2024-01-04 DIAGNOSIS — E78.2 MIXED HYPERLIPIDEMIA: ICD-10-CM

## 2024-01-04 DIAGNOSIS — Z12.11 SCREENING FOR COLON CANCER: ICD-10-CM

## 2024-01-04 DIAGNOSIS — Z11.59 NEED FOR HEPATITIS C SCREENING TEST: ICD-10-CM

## 2024-01-04 DIAGNOSIS — E11.9 TYPE 2 DIABETES MELLITUS WITHOUT COMPLICATION, WITHOUT LONG-TERM CURRENT USE OF INSULIN (HCC): Primary | ICD-10-CM

## 2024-01-04 PROCEDURE — 99204 OFFICE O/P NEW MOD 45 MIN: CPT | Performed by: PHYSICIAN ASSISTANT

## 2024-01-04 RX ORDER — BLOOD SUGAR DIAGNOSTIC
STRIP MISCELLANEOUS
Qty: 100 STRIP | Refills: 5 | Status: SHIPPED | OUTPATIENT
Start: 2024-01-04

## 2024-01-04 RX ORDER — AMLODIPINE BESYLATE 5 MG/1
5 TABLET ORAL DAILY
Qty: 90 TABLET | Refills: 5 | Status: SHIPPED | OUTPATIENT
Start: 2024-01-04 | End: 2024-01-04

## 2024-01-04 RX ORDER — BLOOD-GLUCOSE METER
EACH MISCELLANEOUS
Qty: 1 KIT | Refills: 2 | Status: SHIPPED | OUTPATIENT
Start: 2024-01-04

## 2024-01-04 RX ORDER — AMLODIPINE BESYLATE 5 MG/1
5 TABLET ORAL
Qty: 90 TABLET | Refills: 5 | Status: SHIPPED | OUTPATIENT
Start: 2024-01-04

## 2024-01-04 RX ORDER — EMPAGLIFLOZIN, METFORMIN HYDROCHLORIDE 10; 1000 MG/1; MG/1
10-1000 TABLET, EXTENDED RELEASE ORAL DAILY
Qty: 90 TABLET | Refills: 3 | Status: SHIPPED | OUTPATIENT
Start: 2024-01-04

## 2024-01-04 RX ORDER — LANCETS 33 GAUGE
EACH MISCELLANEOUS
Qty: 100 EACH | Refills: 5 | Status: SHIPPED | OUTPATIENT
Start: 2024-01-04

## 2024-01-04 NOTE — PROGRESS NOTES
Diabetic Foot Exam    Patient's shoes and socks removed.    Right Foot/Ankle   Right Foot Inspection  Skin Exam: skin normal. Skin not intact, no dry skin, no warmth, no callus, no erythema, no maceration, no abnormal color, no pre-ulcer, no ulcer and no callus.     Toe Exam: No swelling, no tenderness, erythema and  no right toe deformity    Sensory   Monofilament testing: intact    Left Foot/Ankle  Left Foot Inspection  Skin Exam: skin normal. Skin not intact, no dry skin, no warmth, no erythema, no maceration, normal color, no pre-ulcer, no ulcer and no callus.     Toe Exam: No swelling, no tenderness, no erythema and no left toe deformity.     Sensory   Monofilament testing: intact    Assign Risk Category  No deformity present  No loss of protective sensation  No weak pulses  Risk: 0

## 2024-01-04 NOTE — PROGRESS NOTES
Assessment/Plan:   Encounter to establish as a new patient:  Previously following with Vandana, recently hospitalized at [facility] and would like to continue care in network  Reviewed recent labs from hospital admission  Will order routine labs for follow-up visit    Type 2 diabetes melitis:  Controlled, last A1c at 8.2  Current regimen includes Ozempic 1 mg weekly, and Synjardy 5-500 mg daily  Patient does not wish to increase Ozempic at this time. Will increase Synjardy to  mg daily  Diabetic foot exam performed in office today  She reports she follows with Ophthalmology, will try to obtain records.  Recheck A1c in 3 months    Essential hypertension:  Patient reports blood pressure runs high in the morning   She has been taking valsartan/hydrochlorothiazide 160-25 mg daily, she increased valsartan to 160-25 mg twice daily dosing with no improvement. She has noticed increased hair loss after increasing valsartan/hctz and would like to try a different medication.   Continue valsartan/hydrochlorothiazide 160-25 mg every morning, and add amlodipine 5 mg nightly  Patient will monitor blood sugar for 1 week and contact the office with readings    Mixed hyperlipidemia:  Last lipid panel on 12/9/2023 shows:  Cholesterol 225  Triglycerides 165  HDL 40    Patient reports noncompliance with atorvastatin prior to lipid panel.  She has since restarted atorvastatin 40 mg daily  Recheck Lipid panel in 3 months    Small right pontine stroke with residual dyesthesias  Patient presented to the ED with left sided numbness and tingling of the face and upper extremity and slurred speech  MRI shows small focus of acute/recent infarction in the right hemipons  She completed 21 days of Plavix and aspirin, and now is on aspirin only.  Tight blood pressure control, glucose control, continue statin and aspirin  Patient will follow-up with neurology next month    Obesity:  BMI 34.45  She is on Ozempic 1mg for DM. She does  "not wish to increase this. We will increase her synjardy.   Discussed dietary changes - patient will try mediterranean diet. She will cut back on refined sugars and carbohydrates.     Quality Measures:   Depression Screening and Follow-up Plan: Patient was screened for depression during today's encounter. They screened negative with a PHQ-9 score of 0.       Return in about 3 months (around 4/4/2024) for Next scheduled follow up.    No problem-specific Assessment & Plan notes found for this encounter.       Diagnoses and all orders for this visit:    Benign essential hypertension  -     amLODIPine (NORVASC) 5 mg tablet; Take 1 tablet (5 mg total) by mouth daily    Type 2 diabetes mellitus without complication, without long-term current use of insulin ([facility])  -     Cancel: Hemoglobin A1C; Future  -     Basic metabolic panel; Future  -     Empagliflozin-metFORMIN HCl ER (Synjardy XR)  MG TB24; Take 10-1,000 mL by mouth in the morning  -     Hemoglobin A1C; Future    Mixed hyperlipidemia  -     Lipid panel; Future    Need for hepatitis C screening test  -     Hepatitis C Antibody; Future    Screening for colon cancer  -     Cologuard    Other specified hearing loss of left ear, unspecified hearing status on contralateral side    Decreased hearing of both ears  -     Ambulatory Referral to Otolaryngology; Future          Subjective:      Patient ID: Theresa Valerio is a 68 y.o. female.    Patient is a 68-year-old female with a past medical history significant for type 2 diabetes mellitus, essential hypertension, hyperlipidemia, obesity, and recent small right pontine stroke.  Patient reports she was not taking her statin prior to her stroke. She did not have side effects, but felt \"good\" so she wasn't taking it. She reports she has been eating more candy. She does not take her blood sugar at home. She does take her blood pressure and reports that is has been running high in the morning but normalizes throughout " the day.        ALLERGIES:  No Known Allergies    CURRENT MEDICATIONS:    Current Outpatient Medications:     albuterol (PROVENTIL HFA,VENTOLIN HFA) 90 mcg/act inhaler, Inhale 2 puffs 4 (four) times a day As needed, Disp: , Rfl:     aspirin 81 mg chewable tablet, Chew 1 tablet (81 mg total) daily Please buy over the counter Do not start before December 10, 2023., Disp: , Rfl: 0    atorvastatin (LIPITOR) 40 mg tablet, Take 1 tablet (40 mg total) by mouth daily, Disp: 40 tablet, Rfl: 1    cyanocobalamin (VITAMIN B-12) 1000 MCG tablet, TAKE 1 TABLET BY MOUTH EVERY DAY, Disp: , Rfl:     Empagliflozin-metFORMIN HCl (Synjardy) 5-500 MG TABS, Take 1 tablet by mouth 2 (two) times a day, Disp: , Rfl:     hydrOXYzine HCL (ATARAX) 50 mg tablet, Take 50 mg by mouth daily at bedtime, Disp: , Rfl:     lifitegrast (Xiidra) 5 % op solution, INSTILL 1 DROP INTO BOTH EYES TWICE A DAY, Disp: , Rfl:     montelukast (SINGULAIR) 10 mg tablet, Take 10 mg by mouth daily, Disp: , Rfl:     naproxen (NAPROSYN) 500 mg tablet, Take 1 tablet (500 mg total) by mouth 2 (two) times a day with meals, Disp: 30 tablet, Rfl: 0    nystatin-triamcinolone (MYCOLOG-II) cream, Apply topically 2 (two) times a day, Disp: 30 g, Rfl: 0    semaglutide, 1 mg/dose, (Ozempic) 4 mg/3 mL injection pen, Inject 1 mg under the skin, Disp: , Rfl:     valsartan-hydrochlorothiazide (DIOVAN-HCT) 160-25 MG per tablet, Take 1 tablet by mouth daily, Disp: 30 tablet, Rfl: 0    clopidogrel (PLAVIX) 75 mg tablet, Take 1 tablet (75 mg total) by mouth daily for 21 doses Do not start before December 10, 2023. (Patient not taking: Reported on 1/4/2024), Disp: 21 tablet, Rfl: 0    melatonin 3 mg, Take 3 mg by mouth (Patient not taking: Reported on 1/4/2024), Disp: , Rfl:     nystatin (MYCOSTATIN) powder, Apply topically 2 (two) times a day, Disp: 30 g, Rfl: 0    ACTIVE PROBLEM LIST:  Patient Active Problem List   Diagnosis    Calcific tendonitis of right shoulder    Ankylosing  hyperostosis    Benign essential hypertension    Current moderate episode of major depressive disorder (HCC)    Food insecurity    Gout    Hypertension goal BP (blood pressure) < 130/80    Impingement syndrome of shoulder region    Long term current use of insulin (HCC)    Mixed hyperlipidemia    Moderate episode of recurrent major depressive disorder (HCC)    Neck pain    Shoulder pain    Type 2 diabetes mellitus without complication, without long-term current use of insulin (HCC)    Small right pontine stroke with residual dysesthesias       PAST MEDICAL HISTORY:  Past Medical History:   Diagnosis Date    Diabetes mellitus (HCC)     Hyperlipidemia     Hypertension        PAST SURGICAL HISTORY:  Past Surgical History:   Procedure Laterality Date     SECTION      HYSTERECTOMY      TONSILLECTOMY         FAMILY HISTORY:  History reviewed. No pertinent family history.    SOCIAL HISTORY:  Social History     Socioeconomic History    Marital status: /Civil Union     Spouse name: Not on file    Number of children: Not on file    Years of education: Not on file    Highest education level: Not on file   Occupational History    Not on file   Tobacco Use    Smoking status: Never    Smokeless tobacco: Never   Substance and Sexual Activity    Alcohol use: Not Currently    Drug use: Never    Sexual activity: Not on file   Other Topics Concern    Not on file   Social History Narrative    Not on file     Social Determinants of Health     Financial Resource Strain: Not on file   Food Insecurity: Food Insecurity Present (5/10/2021)    Received from [facility]    Hunger Vital Sign     Worried About Running Out of Food in the Last Year: Sometimes true     Ran Out of Food in the Last Year: Never true   Transportation Needs: Not on file   Physical Activity: Not on file   Stress: Not on file   Social Connections: Not on file   Intimate Partner Violence: Not on file   Housing Stability: Not on file       Review of Systems    Neurological:  Positive for weakness and numbness.         Objective:  Vitals:    01/04/24 0730   BP: 128/68   BP Location: Left arm   Patient Position: Sitting   Cuff Size: Large   Pulse: 101   SpO2: 96%   Weight: 80 kg (176 lb 6.4 oz)   Height: 5' (1.524 m)     Body mass index is 34.45 kg/m².     Physical Exam  Constitutional:       Appearance: She is obese.   HENT:      Right Ear: Tympanic membrane normal.      Left Ear: Tympanic membrane normal.      Nose: Nose normal.   Cardiovascular:      Rate and Rhythm: Normal rate and regular rhythm.      Heart sounds: Normal heart sounds.   Pulmonary:      Effort: Pulmonary effort is normal.      Breath sounds: Normal breath sounds.   Skin:     General: Skin is warm and dry.   Neurological:      Mental Status: She is alert.   Psychiatric:         Mood and Affect: Mood normal.           RESULTS:  Hemoglobin A1C   Date/Time Value Ref Range Status   12/09/2023 04:49 AM 8.2 (H) Normal 4.0-5.6%; PreDiabetic 5.7-6.4%; Diabetic >=6.5%; Glycemic control for adults with diabetes <7.0% % Final   02/13/2023 08:22 AM 7.2 (H) 4.0 - 5.6 % Final     Comment:     The use of HbA1c to monitor glycemic status is based on normal hemoglobin and HbA composition. This test should not be used in patients with abnormal hemoglobin that affects the half life of the red blood cell or the in vivo glycation rates.      Cholesterol   Date/Time Value Ref Range Status   12/09/2023 04:49  (H) See Comment mg/dL Final     Comment:     Cholesterol:         Pediatric <18 Years        Desirable          <170 mg/dL      Borderline High    170-199 mg/dL      High               >=200 mg/dL        Adult >=18 Years            Desirable        <200 mg/dL      Borderline High  200-239 mg/dL      High             >239 mg/dL       Triglycerides   Date/Time Value Ref Range Status   12/09/2023 04:49  (H) See Comment mg/dL Final     Comment:     Triglyceride:     0-9Y            <75mg/dL     10Y-17Y          <90 mg/dL       >=18Y     Normal          <150 mg/dL     Borderline High 150-199 mg/dL     High            200-499 mg/dL        Very High       >499 mg/dL    Specimen collection should occur prior to Metamizole administration due to the potential for falsely depressed results.     HDL, Direct   Date/Time Value Ref Range Status   12/09/2023 04:49 AM 40 (L) >=50 mg/dL Final     LDL Calculated   Date/Time Value Ref Range Status   12/09/2023 04:49  (H) 0 - 100 mg/dL Final     Comment:     LDL Cholesterol:     Optimal           <100 mg/dl     Near Optimal      100-129 mg/dl     Above Optimal       Borderline High 130-159 mg/dl       High            160-189 mg/dl       Very High       >189 mg/dl         This screening LDL is a calculated result.   It does not have the accuracy of the Direct Measured LDL in the monitoring of patients with hyperlipidemia and/or statin therapy.   Direct Measure LDL (WSL634) must be ordered separately in these patients.     Hemoglobin   Date/Time Value Ref Range Status   12/09/2023 04:49 AM 12.4 11.5 - 15.4 g/dL Final     Hematocrit   Date/Time Value Ref Range Status   12/09/2023 04:49 AM 39.9 34.8 - 46.1 % Final     Platelets   Date/Time Value Ref Range Status   12/09/2023 04:49  149 - 390 Thousands/uL Final     Sodium   Date/Time Value Ref Range Status   12/09/2023 04:49  135 - 147 mmol/L Final     BUN   Date/Time Value Ref Range Status   12/09/2023 04:49 AM 16 5 - 25 mg/dL Final     Creatinine   Date/Time Value Ref Range Status   12/09/2023 04:49 AM 0.78 0.60 - 1.30 mg/dL Final     Comment:     Standardized to IDMS reference method      In chart    This note was created with voice recognition software.  Phonic, grammatical and spelling errors may be present within the note as a result.

## 2024-01-08 ENCOUNTER — OFFICE VISIT (OUTPATIENT)
Age: 69
End: 2024-01-08
Payer: COMMERCIAL

## 2024-01-08 ENCOUNTER — EVALUATION (OUTPATIENT)
Age: 69
End: 2024-01-08
Payer: COMMERCIAL

## 2024-01-08 DIAGNOSIS — R20.0 NUMBNESS: Primary | ICD-10-CM

## 2024-01-08 PROCEDURE — 97112 NEUROMUSCULAR REEDUCATION: CPT

## 2024-01-08 PROCEDURE — 97530 THERAPEUTIC ACTIVITIES: CPT

## 2024-01-08 PROCEDURE — 97161 PT EVAL LOW COMPLEX 20 MIN: CPT

## 2024-01-08 NOTE — PROGRESS NOTES
OCCUPATIONAL THERAPY INITIAL EVALUATION    Today's Date: 2024  Patient Name: Theresa Valerio  : 1955  MRN: [id number]  Referring Provider: Sherri Ochoa CRNP  Dx: Numbness [R20.0]      POC expires Auth Status Total   Visits  Start date  Expiration date PT/OT + Visit Limit? Co-Insurance   3/22/24 required  23  BOMN No                                           Visit/Unit Tracking  AUTH Status:  Date              Visits  Authed:  Used 1 1              Remaining                  PLAN OF CARE START:23  PLAN OF CARE END: 3/22/2024  PROGRESS NOTE DUE:   FREQUENCY: 1-2 x's per week  PRECAUTIONS   DIAGNOSIS: Numbness (stroke like symptoms)  VISITS: 2        Daily Note     Today's date: 2024  Patient name: Tehresa Valerio  : 1955  MRN: [id number]  Referring provider: Sherri Ochoa CRNP  Dx:   Encounter Diagnosis   Name Primary?    Numbness Yes                      Subjective: Pt reports she recently saw primary care to f/u about BP medication.       Objective: See treatment below.    *Pt seated at table completing multimatrix block transport. Pt focusing on alternating attention, distal pinch, working memory and sequencing moving lettered blocks on top of shape blocks following visual cue card (four high) in the forwards order. Pt with min dropping noted during block stacking and 3 lbs wrist weight added to increase proprioceptive input to L UE. Increased time to task with min verbal cues.    Access Code: [email or web address]  URL: [email or web address]  Date: 2024  Prepared by: Yanira Andre    *Exercises  - Putty Squeezes  - 1 x daily - 7 x weekly - 1 sets - 10 reps  - Thumb MCP and IP Flexion with Putty  - 1 x daily - 7 x weekly - 1 sets - 10 reps  - Finger Lumbricals with Putty  - 1 x daily - 7 x weekly - 1 sets - 10 reps  - Key Pinch with Putty  - 1 x daily - 7 x weekly - 1 sets - 10 reps  - Seated Finger Extension with Putty  - 1 x daily - 7 x weekly - 1 sets -  10 reps  - Seated Finger Abduction with Putty  - 1 x daily - 7 x weekly - 1 sets - 10 reps  - Finger Adduction with Putty  - 1 x daily - 7 x weekly - 1 sets - 10 reps  - Thumb Opposition with Putty  - 1 x daily - 7 x weekly - 1 sets - 10 reps      Assessment: Tolerated treatment well. Theraputty HEP provided this session with frequency and duration education. Pt would benefit from continued OT services to address decreased hand  strength, pincer, tripod and lateral pinch strength, L UE strength, fine motor coordination and cognitive skills.       Plan: Continued skilled OT per POC.

## 2024-01-08 NOTE — PROGRESS NOTES
"                                                                    PT Evaluation          POC expires Auth Status Total   Visits  Start date  Expiration date PT/OT + Visit Limit? Co-Insurance   24 none bomn 24 bomn No                                           Visit/Unit Tracking  AUTH Status: none Date IE - 24              Visits  Authed: bomn Used 1               Remaining                           Today's date: 2024  Patient name: Theresa Valerio  : 1955  MRN: [id number]  Referring provider: Sherri Ochoa CRNP  Dx:   Encounter Diagnosis     ICD-10-CM    1. Numbness  R20.0             Assessment  Assessment details: Patient is a 68 y.o. Female who presents to skilled outpatient PT after a CVA who is having continued L sided numbness and imbalance. She has experienced a disturbance in her sensation and the numbness fluctuates on her L side. She does have pain along the medial border of her scapula that is intermittent and is reported to feel like a \"coal\" is on her back, and this does wake her up at night. She has also had a significant reduction in her sleeping hours and has not been able to work since the CVA. She is at an increased risk for falls as per the 5xSTS and FGA. She does not have appropriate righting reactions during functional mobility and this also increases her risk of a fall. She also has the following impairments: abnormal coordination, abnormal gait, activity intolerance, impaired balance, impaired physical strength, lacks appropriate HEP, pain with function, safety issue, abnormal movement, and abnormal muscle firing. She can benefit from skilled PT interventions to address her deficits and promote the maximal level of function and to promote a full return to her PLOF.      Outcome Measures Initial Eval  24 SD   5xSTS 14.09 sec    TUG  - Regular   9.48 sec    10 meter 1.04 m/s    TEMPLE NV/56    FGA               6MWT NV ft                  Impairments: " Abnormal coordination, Abnormal gait, Activity intolerance, Impaired balance, Impaired physical strength, Lacks appropriate HEP, Pain with function, Safety issue, Abnormal movement, Abnormal muscle firing  Understanding of Dx/Px/POC: Good  Prognosis: Good    Patient verbalized understanding of POC.       Please contact me if you have any questions or recommendations. Thank you for the referral and the opportunity to share in Theresa Valerio's care.      Plan  Plan details: Skilled PT to address pt goals of improving balance and promoting a return to work  Patient would benefit from: Skilled PT  Planned modality interventions: Biofeedback, Cryotherapy, TENS, Thermotherapy  Planned therapy interventions: Abdominal trunk stabilization, ADL training, Balance, Balance/WB training, Breathing training, Body mechanics training, Coordination, Functional ROM exercises, Gait training, HEP, Joint Mobilization, Manual Therapy, Villalta taping, Motor coordination training, Neuromuscular re-education, Patient education, Postural training, Strengthening, Stretching, Therapeutic activities, Therapeutic exercises, Therapeutic training, Transfer training, Activity modification, Work reintegration  Frequency:  1-3x/wk  Duration in weeks: 12  Plan of Care beginning date: 1/8/24  Plan of Care expiration date: 12 weeks - 4/1/2024  Treatment plan discussed with: Patient       Goals  Short Term Goals (4 weeks):    - Patient will complete Temple  - Patient will complete 6 MWT  - Patient will be independent in basic HEP 2-3 weeks  - Patient will improve 5xSTS score by 2.3 seconds to demonstrate increased power production  - Patient will increase FGA score by 4 points to meet MDC    Long Term Goals (12 weeks):  - Patient will be independent in a comprehensive home exercise program  - Patient will improve gait speed by 0.18 m/s to increase speed for crossing streets/community access  - Patient will improve TEMPLE by 6 points in order to  "demonstrate a reduced fall risk  - Patient will improve scoring on FGA by 4 points to demonstrate a decreased fall risk  - Patient will improve 6 Minute Walk Test score by 190 feet to promote increased endurance  - Patient will report going on walks at least 3 days per week to promote increased endurance  - Patient will be able to ascend/descend stairs reciprocally with 1 UE assist to improve safety and functional mobility    Patient reported goals  - Patient stated she wants to make her balance \"better\"  - Patient would like to return to work    Cut off score    All date taken from [facility] Neuro Section or Rehab Measures      Velásquez/56  MDC: 6 pts  Age Norms:  60-69: M - 55   F - 55  70-79: M - 54   F - 53  80-89: M - 53   F - 50 5xSTS: Landon et al 2010  MDC: 2.3 sec  Age Norms:  60-69: 11.1 sec  70-79: 12.6 sec  80-89: 14.8 sec   TUG  MDC: 4.14 sec  Cut off score:  >13.5 sec community dwelling adults  >32.2 frail elderly  <20 I for basic transfers  >30 dependent on transfers 10 Meter Walk Test: Kolton et al 2011  MDC: 0.18 m/s  20-29: M - 1.35 m   F - 1.34 m  30-39: M - 1.43 m   F - 1.34 m  40-49: M - 1.43 m   F - 1.39 m  50-59: M - 1.43 m   F - 1.31 m  60-69: M - 1.34 m   F - 1.24 m  70-79: M - 1.26 m   F - 1.13 m  80-89: M - 0.97 m   F - 0.94 m    Household Ambulator < 0.4 m/s  Limited Community Ambulator 0.4 - 0.8 m/s  Community Ambulator 0.8 - 1.2 m/s  Safely cross the street > 1.2 m/s   FGA  MCID: 4 pts  Geriatrics/community < 22/30 fall risk  Geriatrics/community < 20/30 unexplained falls    DGI  MDC: vestibular - 4 pts  MDC: geriatric/community - 3 pts  Falls risk <19/24 mCTSIB  Norm: 20-60 yrs  Eyes open firm: norm sway 0.21-0.48  Eyes closed firm: norm sway 0.48-0.99  Eyes open foam: norm sway 0.38-0.71  Eyes closed foam: norm sway 0.70-2.22   6 Minute Walk Test  MDC: 190.98 ft  MCID: 164 ft    Age Norms  60-69: M - 1876 ft (571.80 m)  F - 1765 ft (537.98 m)  70-79: M - 1729 ft (527.00 m)  F - " 1545 ft (470.92 m)  80-89: M - 1368 ft (416.97 m)  F - 1286 ft (391.97 m) ABC: Benson & Villalba, 2003  <67% increased risk for falls       Subjective    History of Present Illness  Mechanism of injury: She reports she had a CVA about 1 months ago and she continues to have numbness on the entire L side of her body. She is having difficulty sleeping since she had the stroke.  Primary AD: none  Assist level at home: independent  WC usage: no  PLOF: independent    Pain  Current pain ratin/10  At best pain ratin/10  At worst pain ratin/10  Location: L medial border of scapula  Aggravating factors: none    Social Support  Steps to enter house: 2  Stairs in house: 13 with a railing   Lives in: 2 story home  Lives with: , daughter and her spouse, grandson     Employment status: work for [employer], has not worked for 1 month   Hand dominance: R    Treatments  Previous treatment: none  Current treatment: PT  Diagnostic Testing: MRI    Objective     UE MMT  - R Shoulder Flexion: 4/5  L Shoulder Flexion: 4-/5  - R Shoulder Abduction: 4/5  L Shoulder Abduction: 4-/5  - R Elbow Extension: 4-/5  L Elbow Extension: 3+/5  - R Elbow Flexion: 4/5   L Elbow Flexion: 4-/5 and 4/5    LE MMT  - R Hip Flexion: 4-/5   L Hip Flexion: 3+/5  - R Hip Extension: 4/5   L Hip Extension: 4-/5  - R Hip Abduction: 4/5   L Hip Abduction: 4/5  - R Hip Adduction: 4/5   L Hip Adduction: 4/5  - R Knee Extension: 4/5  L Knee Extension: 4-/5  - R Knee Flexion: 4-/5   L Knee Flexion: 4-/5  - R Ankle DF: 4/5   L Ankle DF: 4/5  - R Ankle PF: 4/5   L Ankle PF: 4/5    Sensation  - Light touch: intact    Vitals  HR 66 bpm  /87 (seated, L arm, automatic)    Coordination  - Heel to Shin: intact  - Alternate Toe Taps: abnormal  - Finger to Nose: intact    Reflexes/Clonus  - Clonus: No    OT Screen  - Difficulty w/ clothing fasteners: Yes  - Difficulty w/ bathing: No  - Difficulty w/ dressing: No  - Difficulty w/ toileting: No    -  Difficulty w/ medication management: No    Gait  - Abnormalities: decreased speed, varying step length bilaterally, wide ALICJA       Outcome Measures Initial Eval  1/8/24    5xSTS 14.09 sec    TUG  - Regular   9.48 sec    10 meter 1.04 m/s    MAVERICK NV/56    FGA 18/30              6MWT NV ft                      Precautions: hypertension  Past Medical History:   Diagnosis Date    Diabetes mellitus (HCC)     Hyperlipidemia     Hypertension

## 2024-01-09 ENCOUNTER — TELEPHONE (OUTPATIENT)
Dept: INTERNAL MEDICINE CLINIC | Facility: CLINIC | Age: 69
End: 2024-01-09

## 2024-01-09 NOTE — TELEPHONE ENCOUNTER
----- Message from Mary Kay Tinoco PA-C sent at 1/9/2024  8:27 AM EST -----  Can we please let Theresa know that there there are no forms to be filled out by a provider in the [facility] packet that she gave me last week.

## 2024-01-10 ENCOUNTER — TELEPHONE (OUTPATIENT)
Dept: INTERNAL MEDICINE CLINIC | Facility: CLINIC | Age: 69
End: 2024-01-10

## 2024-01-10 NOTE — TELEPHONE ENCOUNTER
She had called and I returned her call, had to leave a lmm for her to understand to bring another copy in

## 2024-01-10 NOTE — TELEPHONE ENCOUNTER
"Message from patient:  \"Good afternoon. My name is Theresa Hernandez. My phone number is [phone]. I received a message from Ruth regarding my [facility] papers. If you could just give me a call back, I would. Sure, I'd drop them there to her, but I won't. I can e-mail a copy or I can bring in a paper copy. Just give me a call back. We'll work it out. Thank you, Ruth.\"    "

## 2024-01-11 NOTE — TELEPHONE ENCOUNTER
LMOM for patient to return call to review FMLA  paperwork, apparently there is no area in the forms where it is indicated that a provider needs to complete or sign.

## 2024-01-12 ENCOUNTER — TELEPHONE (OUTPATIENT)
Dept: INTERNAL MEDICINE CLINIC | Facility: CLINIC | Age: 69
End: 2024-01-12

## 2024-01-12 NOTE — PROGRESS NOTES
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  Persistent symptoms seem to wax and wane with intermittent left upper and lower extremity numbness that spontaneously resolves then re-presents.  CTA shows anomalous ICA vessels however intracranial stenoses appear most severe.  She has an appointment with neurology in the near future.  There is no indication for vascular surgical intervention at this time.  If intervention for intracranial stenoses is necessary would consider discussing with neurointerventional.    Subjective:      Patient ID: Theresa Valerio is a 68 y.o. female.    New patient, referred for carotid artery stenosis and presents today for evaluation. CTA head/neck done 12/8/23. Reports 1 episode of numbness and slurred speech. Reports \"continued numbness to entire left side of body.\"     HPI  Patient is a pleasant 68-year-old female with type 2 diabetes and recent MRI evidence of pontine stroke.  CTA evaluation has revealed intracranial stenoses on the right, there is left-sided common carotid stenosis and noted anatomical anomaly.  At this time her symptoms include left upper extremity numbness that fluctuates between the upper and lower arm as well as being associated with knee pain.  She states she never had any of the symptoms prior to December.    Review of Systems   Constitutional: Negative.    HENT: Negative.     Eyes: Negative.    Respiratory: Negative.     Cardiovascular: Negative.    Gastrointestinal: Negative.    Endocrine: Negative.    Genitourinary: Negative.    Musculoskeletal: Negative.    Skin: Negative.    Allergic/Immunologic: Negative.    Neurological:  Positive for numbness.   Hematological: Negative.    Psychiatric/Behavioral: Negative.           Objective:      /70 (BP Location: Right arm, Patient Position: Sitting)   Pulse 84   Ht 5' (1.524 m)   Wt 81.6 kg (180 " lb)   BMI 35.15 kg/m²          Physical Exam  Constitutional:       Appearance: Normal appearance.   HENT:      Head: Normocephalic and atraumatic.      Nose: No congestion or rhinorrhea.   Eyes:      Extraocular Movements: Extraocular movements intact.      Pupils: Pupils are equal, round, and reactive to light.   Cardiovascular:      Rate and Rhythm: Normal rate and regular rhythm.   Pulmonary:      Effort: Pulmonary effort is normal.      Breath sounds: No stridor.   Abdominal:      General: There is no distension.      Tenderness: There is no abdominal tenderness.   Musculoskeletal:         General: No swelling. Normal range of motion.      Cervical back: Normal range of motion and neck supple.   Skin:     General: Skin is warm.      Coloration: Skin is not jaundiced.   Neurological:      General: No focal deficit present.      Mental Status: She is alert and oriented to person, place, and time.      Comments: Numbness to left upper extremity.   Psychiatric:         Mood and Affect: Mood normal.         Behavior: Behavior normal.

## 2024-01-12 NOTE — TELEPHONE ENCOUNTER
Garrett, spouse, dropped off [facility] paperwork to be filled out for Theresa. She was here on 01/04 to establish and didn't have all the necessary paperwork.    Please fax completed forms to [phone].    Please call Garrett when complete and he will  original. Call back #[phone]    Placed in MA's bin

## 2024-01-15 ENCOUNTER — CONSULT (OUTPATIENT)
Dept: VASCULAR SURGERY | Facility: CLINIC | Age: 69
End: 2024-01-15
Payer: COMMERCIAL

## 2024-01-15 VITALS
HEART RATE: 84 BPM | BODY MASS INDEX: 35.34 KG/M2 | SYSTOLIC BLOOD PRESSURE: 118 MMHG | DIASTOLIC BLOOD PRESSURE: 70 MMHG | WEIGHT: 180 LBS | HEIGHT: 60 IN

## 2024-01-15 DIAGNOSIS — I65.23 CAROTID STENOSIS, BILATERAL: ICD-10-CM

## 2024-01-15 DIAGNOSIS — I63.50 RIGHT PONTINE STROKE (HCC): Primary | ICD-10-CM

## 2024-01-15 PROCEDURE — 99204 OFFICE O/P NEW MOD 45 MIN: CPT | Performed by: SURGERY

## 2024-01-15 NOTE — LETTER
"January 15, 2024     Mary Kay Tinoco PA-C  [address]  [address]  [facility] [address]    Patient: Theresa Valerio   YOB: 1955   Date of Visit: 1/15/2024       Dear Dr. Tinoco:    Thank you for referring Theresa Valerio to me for evaluation. Below are my notes for this consultation.    If you have questions, please do not hesitate to call me. I look forward to following your patient along with you.         Sincerely,        Chi Delgado MD        CC: JOCELIN West  Theresa Valerio    Chi Delgado MD  1/15/2024  9:26 AM  Sign when Signing Visit  Assessment/Plan:    Small right pontine stroke with residual dysesthesias  Patient describes identification of previous pontine stroke in early December.  Persistent symptoms seem to wax and wane with intermittent left upper and lower extremity numbness that spontaneously resolves then re-presents.  CTA shows anomalous ICA vessels however intracranial stenoses appear most severe.  She has an appointment with neurology in the near future.  There is no indication for vascular surgical intervention at this time.  If intervention for intracranial stenoses is necessary would consider discussing with neurointerventional.    Subjective:      Patient ID: Theresa Valerio is a 68 y.o. female.    New patient, referred for carotid artery stenosis and presents today for evaluation. CTA head/neck done 12/8/23. Reports 1 episode of numbness and slurred speech. Reports \"continued numbness to entire left side of body.\"     HPI  Patient is a pleasant 68-year-old female with type 2 diabetes and recent MRI evidence of pontine stroke.  CTA evaluation has revealed intracranial stenoses on the right, there is left-sided common carotid stenosis and noted anatomical anomaly.  At this time her symptoms include left upper extremity numbness that fluctuates between the upper and lower arm as well as being associated with knee pain.  " She states she never had any of the symptoms prior to December.    Review of Systems   Constitutional: Negative.    HENT: Negative.     Eyes: Negative.    Respiratory: Negative.     Cardiovascular: Negative.    Gastrointestinal: Negative.    Endocrine: Negative.    Genitourinary: Negative.    Musculoskeletal: Negative.    Skin: Negative.    Allergic/Immunologic: Negative.    Neurological:  Positive for numbness.   Hematological: Negative.    Psychiatric/Behavioral: Negative.           Objective:      /70 (BP Location: Right arm, Patient Position: Sitting)   Pulse 84   Ht 5' (1.524 m)   Wt 81.6 kg (180 lb)   BMI 35.15 kg/m²          Physical Exam  Constitutional:       Appearance: Normal appearance.   HENT:      Head: Normocephalic and atraumatic.      Nose: No congestion or rhinorrhea.   Eyes:      Extraocular Movements: Extraocular movements intact.      Pupils: Pupils are equal, round, and reactive to light.   Cardiovascular:      Rate and Rhythm: Normal rate and regular rhythm.   Pulmonary:      Effort: Pulmonary effort is normal.      Breath sounds: No stridor.   Abdominal:      General: There is no distension.      Tenderness: There is no abdominal tenderness.   Musculoskeletal:         General: No swelling. Normal range of motion.      Cervical back: Normal range of motion and neck supple.   Skin:     General: Skin is warm.      Coloration: Skin is not jaundiced.   Neurological:      General: No focal deficit present.      Mental Status: She is alert and oriented to person, place, and time.      Comments: Numbness to left upper extremity.   Psychiatric:         Mood and Affect: Mood normal.         Behavior: Behavior normal.

## 2024-01-15 NOTE — PATIENT INSTRUCTIONS
1. Small right pontine stroke with residual dysesthesias  Assessment & Plan:  Patient describes identification of previous pontine stroke in early December.  Persistent symptoms seem to wax and wane with intermittent left upper and lower extremity numbness that spontaneously resolves then re-presents.  CTA shows anomalous ICA vessels however intracranial stenoses appear most severe.  She has an appointment with neurology in the near future.  There is no indication for vascular surgical intervention at this time.  If intervention for intracranial stenoses is necessary would consider discussing with neurointerventional.      2. Carotid stenosis, bilateral  -     Ambulatory Referral to Vascular Surgery

## 2024-01-15 NOTE — ASSESSMENT & PLAN NOTE
Patient describes identification of previous pontine stroke in early December.  Persistent symptoms seem to wax and wane with intermittent left upper and lower extremity numbness that spontaneously resolves then re-presents.  CTA shows anomalous ICA vessels however intracranial stenoses appear most severe.  She has an appointment with neurology in the near future.  There is no indication for vascular surgical intervention at this time.  If intervention for intracranial stenoses is necessary would consider discussing with neurointerventional.

## 2024-01-16 ENCOUNTER — TELEPHONE (OUTPATIENT)
Dept: INTERNAL MEDICINE CLINIC | Facility: CLINIC | Age: 69
End: 2024-01-16

## 2024-01-16 ENCOUNTER — APPOINTMENT (OUTPATIENT)
Age: 69
End: 2024-01-16
Payer: COMMERCIAL

## 2024-01-16 NOTE — TELEPHONE ENCOUNTER
Pt decided to work .,....    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     Whatever cm prefers?

## 2024-01-22 ENCOUNTER — OFFICE VISIT (OUTPATIENT)
Age: 69
End: 2024-01-22
Payer: COMMERCIAL

## 2024-01-22 ENCOUNTER — EVALUATION (OUTPATIENT)
Age: 69
End: 2024-01-22
Payer: COMMERCIAL

## 2024-01-22 ENCOUNTER — TELEPHONE (OUTPATIENT)
Dept: NEUROLOGY | Facility: CLINIC | Age: 69
End: 2024-01-22

## 2024-01-22 DIAGNOSIS — I63.50 RIGHT PONTINE STROKE (HCC): ICD-10-CM

## 2024-01-22 DIAGNOSIS — R20.0 NUMBNESS: Primary | ICD-10-CM

## 2024-01-22 DIAGNOSIS — I63.50 RIGHT PONTINE STROKE (HCC): Primary | ICD-10-CM

## 2024-01-22 PROCEDURE — 97112 NEUROMUSCULAR REEDUCATION: CPT

## 2024-01-22 PROCEDURE — 97530 THERAPEUTIC ACTIVITIES: CPT

## 2024-01-22 NOTE — PROGRESS NOTES
"Daily Note     Today's date: 2024  Patient name: Theresa Valerio  : 1955  MRN: [id number]  Referring provider: Sherri Ochoa CRNP  Dx:   Encounter Diagnosis     ICD-10-CM    1. Numbness  R20.0       2. Small right pontine stroke with residual dysesthesias  I63.50           Start Time: 0800  Stop Time: 0840  Total time in clinic (min): 40 minutes    Subjective: She reports she is not having a good day because she has numbness from her knee down to her foot on her L side. She states that the numbness is variable. She also is nervous about returning to work because her balance is still impaired, but only has difficulty intermittently.      Objective: See treatment diary below  Velásquez56  6 MWT: 970'    Retro amb 3 laps in // bars  Lateral amb 3 laps in // bars  Semi-tandem stance 30\" x2  Tandem stance 30\" x2  Walking at self-selected pace 100'x1, 4# on RLE and 5# on LLE  Walking at increased speed 100'x2, 4# on RLE and 5# on LLE  Walking with HHA on R 100'x6, 4# on RLE and 5# on LLE      Assessment: Tolerated treatment well. With fatigue, she leans laterally in each direction to compensate for proximal hip weakness. Her balance during functional mobility appears fair, but she does deviate to her L and R throughout. No significant LOBs noted during session. We discussed that she can continue walking at home to increase her endurance. Patient demonstrated fatigue post treatment and would benefit from continued PT to maximize function.       Plan: Continue per plan of care.      POC expires Auth Status Total   Visits  Start date  Expiration date PT/OT + Visit Limit? Co-Insurance   24 none bomn 24 bomn No                                           Visit/Unit Tracking  AUTH Status: none Date IE - 24             Visits  Authed: [email or web address] Used 1               Remaining                         "

## 2024-01-22 NOTE — PROGRESS NOTES
POC expires Auth Status Total   Visits  Start date  Expiration date PT/OT + Visit Limit? Co-Insurance   3/22/24 required  12/22/23  BOMN No                                           Visit/Unit Tracking  AUTH Status:  Date 12/22 1/8 1/22 (PN)            Visits  Authed:  Used 1 1 1             Remaining                  PLAN OF CARE START:12/22/23  PLAN OF CARE END: 3/22/2024  PROGRESS NOTE DUE:   FREQUENCY: 1-2 x's per week  PRECAUTIONS   DIAGNOSIS: Numbness (stroke like symptoms)  VISITS: 3    Daily Note      Subjective: I have been doing the putty at home.       Impairment Observations: Assessed the following this session to assess progress in therapy: JOHN /pinch strength, L UE MMT. All other observations/assessments carried forward from previous evaluation. See below for goal updates.       SARA WESTON Comments Status on IE           UPPER EXTREMITY FUNCTION   Intact Intact Dominant Hand: right include date     /PINCH STRENGTH              Dynamometer     - Gross Grasp 40 lbs- improved 28 lbs- improved  abnormal  R UE NORM 50 lbs  L UE NORM 41 lbs    Pinch Meter      - Pincer 6 lbs 6 lbs- improved abnormal  UE NORM 11lbs     - Tripod 10 lbs- improved 7 lbs- improved abnormal  UE NORM 14lbs     - Lateral 12 lbs - improved  13 lbs- improved  abnormal  UE NORM 14lbs      AROM (Seated)         WFL      MMT              Shoulder FF 5/5 5/5     Shoulder Ext 5/5 4/5 improved    Shoulder Abduction 5/5 4+/5     Shoulder Adduction 5/5 4+/5     Elbow Flex 5/5 4+/5     Elbow Ext 5/5 4+/5     Wrist Flex 5/5 4-/5 improved    Wrist Ext 5/5 4+/5     Gross Grasp 4/5 4-/5 improved      SENSATION         COORDINATION       Opposition WFL WFL     Finger to Nose WFL WFL     Rapid Alternating Movement WFL WFL     9 Hole Peg Test-  assesses dexterity/fine motor coordination  23 seconds 28 seconds abnormal    Pt demonstrates decreased FMC compared to norms for age/sex (20 seconds)     Fxnl Dexterity Test-assesses patient's  ability to use the hand for daily tasks requiring a 3-jaw carlos prehension between the fingers and the thumb  32 seconds 35 seconds normal    Pt demonstrates decreased dexterity compared to norms for age/sex (35 seconds)          [place] Cognitive Assessment (MoCA)       SCORE Comments STATUS ON IE                                         COGNITIVE FXN                       MOCA (8.1)        Education Level = 12 years      Visuospatial/executive functioning /5      Naming 3/3      Memory: 1st trial:       Memory: 2nd trial:       Attention/concentration 2/      List of letters:        Serial Seven Subtraction: 1/3      Language/sentence repetition:       Language Fluency:        Abstract/Correlational Thinkin/2      Delayed Recall:       Orientation:       Memory Index Score 7/15                                         Total Score         MoCA Scoring        Normal: 26+         Mild Cognitive Impairment: 18-25          Moderate Cognitive Impairment: 10-17         Severe Cognitive Impairment: <10   Mild Cognitive Impairment          SHORT TERM GOALS (6-8 weeks)    GOAL  STATUS ON IE  GOAL STATUS    Pt will improve LUE  strength from 28 lbs to 35 lbs as evidenced by the JOHN hand  assessment to improve performance within daily routines and life roles.    20 lbs Revised    Pt will improve L UE tripod pinch strength from 6 lbs to 8 lbs as evidenced by the JOHN pinch strength assessment to improve performance within daily routines and life roles.    6 lbs Progressing, continue    Pt will improve R UE tripod pinch strength from 7 lbs to 9 lbs as evidenced by the JOHN pinch strength assessment to improve performance within daily routines and life roles.  7 lbs Achieved    Pt will improve L UE  fine motor coordination from 28s to WFLs as evidenced by the 9HPT to improve performance with with ADL and iADL occupations.    28 s Established    Pt will improve L UE proximal strength  from 4-/5 to 5/5 to improve performance within return to work and IADL occupations.  4- Progressing, continue   Pt will improve L UE distal strength from 4-/5 to 5/5 to improve performance within return to work and IADL occupations.  4- Progressing, continue   Pt will improve delayed recall section of the MoCA from 1 to 3 to improve cognitive performance within daily routines and life roles.    1 Established    LONG TERM GOALS( > 8 weeks)    GOAL  STATUS ON IE  GOAL STATUS    Pt will improve LUE  strength from 20 lbs to 35 lbs as evidenced by the JOHN hand  assessment to improve performance within daily routines and life roles.  20 lbs Progressing, continue    Pt will improve L UE tripod pinch strength from 6 lbs to 10  lbs as evidenced by the JOHN pinch strength assessment to improve performance within daily routines and life roles.  6 lbs Progressing, continue    Pt will improve R UE tripod pinch strength from 7 lbs to 11 lbs as evidenced by the JOHN pinch strength assessment to improve performance within daily routines and life roles.  7 lbs Progressing, continue    Pt will improve delayed recall section of the MoCA from 1 to 4 to improve cognitive performance within daily routines and life roles.  1 Established    Pt will improve MoCA score from 20/30 to 26/30 to improve performance within return to work and iADL occupations.  20 Established      TA:  *Provided and educated on internal and external memory strategies to carry over into daily routine. Pt instructed to highlight strategies already being used at home and practice them daily. Homework given today for practice. Strategies highlighted include: rehearsal, repetition, personal meaning, imagery, association and external reminders.     NMRE:  Stance on airex completing 120 number board seated tabletop-transporting marbles and foam blocks > board by following highlighted key in sequencing ascending order. Placed green marbles counting by 4's and  foam blocks counting by 3's. Pt completing transport via green resistive clothespin and 3 lbs wrist weights on B UE.     Assessment: Ga /pinch strength and L UE MMT assessed this session to assess progress in therapy services. Improvement in R UE  strength, L UE  strength, bilateral tripod pinch, bilateral lateral pinch, left pincer strength, L UE shoulder extension MMT and L UE wrist flexion MMT noted. Pt reports similar decreased sensation in hands and feet as well as decreased upper body strength and impaired short term memory limiting her return to work ability. Primary care and vascular doctor follow up recently with nothing new reported today. Pt scheduled with Neurology at the end of January. Pt currently not driving and agreeable to schedule fitness to drive evaluation. Pt continues to make steady progress toward all goals. Pt has demonstrated good understanding of all HEPs throughout program.     Plan: Continue skilled OT POC.

## 2024-01-24 ENCOUNTER — OFFICE VISIT (OUTPATIENT)
Age: 69
End: 2024-01-24
Payer: COMMERCIAL

## 2024-01-24 DIAGNOSIS — R20.0 NUMBNESS: Primary | ICD-10-CM

## 2024-01-24 DIAGNOSIS — I63.50 RIGHT PONTINE STROKE (HCC): ICD-10-CM

## 2024-01-24 PROCEDURE — 97112 NEUROMUSCULAR REEDUCATION: CPT

## 2024-01-24 NOTE — PROGRESS NOTES
POC expires Auth Status Total   Visits  Start date  Expiration date PT/OT + Visit Limit? Co-Insurance   3/22/24 required  12/22/23  BOMN No                                           Visit/Unit Tracking  AUTH Status:  Date 12/22 1/8 1/22 (PN) 1/24           Visits  Authed:  Used 1 1 1 1            Remaining                  PLAN OF CARE START:12/22/23  PLAN OF CARE END: 3/22/2024  PROGRESS NOTE DUE:   FREQUENCY: 1-2 x's per week  PRECAUTIONS   DIAGNOSIS: Numbness (stroke like symptoms)  VISITS: 4    Daily Note      Subjective: I have been doing the putty at home.         Objective: See treatment below.    *Pt alternating seated, then standing at table performing forward shoulder flexion reach at shoulder height and above completing tripod resistive pinch to clip playing cards and clothespins to vertical pole. Pt pinching clothespin 3 times before over head reach with 3 lbs added wrist weights bilaterally for increased proprioceptive input. Cognitive integration to keep track of card sums. Min difficulty tracking math while conversing with therapist. Pt removing cards/clothespin stance on airex completing 180 degree turns to place cards on table top behind self. CS assist required with min sway.    *Stance on airex completing palm to finger tip translation of purdue peg board pegs to place in vertical pegboard. Pt with L UE reaching for pegs on raised side table with 3 lbs wrist weights to place in vertical surface. Pt taking pegs out 3 at a time and translating one at a time to fingertips.     Assessment: Tolerated treatment well. Theraputty HEP provided this session with frequency and duration education. Pt would benefit from continued OT services to address decreased hand  strength, pincer, tripod and lateral pinch strength, L UE strength, fine motor coordination and cognitive skills.       Plan: Continued skilled OT per POC.

## 2024-01-24 NOTE — TELEPHONE ENCOUNTER
Received VM transcription from 1/23/24, 10:18 AM:    Good morning. My name is Theresa Valerio. The reason for my call, I received the message from Maggie to confirm an appointment for next Wednesday, the 31st. Yes, I will be there. Thank you so much for calling. If you have any need of me, please feel free to reach out at [phone]. Thank you.

## 2024-01-24 NOTE — PROGRESS NOTES
"Daily Note     Today's date: 2024  Patient name: Theresa Valerio  : 1955  MRN: [id number]  Referring provider: Sherri Ochoa CRNP  Dx:   Encounter Diagnosis     ICD-10-CM    1. Numbness  R20.0       2. Small right pontine stroke with residual dysesthesias  I63.50                      Subjective: She reports she is tired today, inc pain in LLE with burning sensation      Objective: See treatment diary below    5# AW on LLE, 4# AW on RLE  High knee march with SLS 3s iso for trendeleberg VC   Retro amb 4 laps   Lateral amb 4 laps  Jogging 2 laps around clinic  Semi-tandem stance 30\" x2  Tandem stance 30\" x2  Walking at self-selected pace 100'x1  Walking at increased speed 100'x2  Agility ladder: in/out, fwd, two step, 4 laps ea  Tandem wlaking fwd/bwd 2 laps  Black airex pads EC V/H Hts 30x ea  Runners step up on 6\" stair  2x15 ea    Assessment: Tolerated treatment well. Noted improved endurance today, not requiring frequent rest breaks. She was able to complete  No significant LOBs noted during session. Inc difficulty with runners step up requiring 1UE assist. Will cont to progress NV. Vitals WFL. Patient demonstrated fatigue post treatment and would benefit from continued PT to maximize function.       Plan: Continue per plan of care.      POC expires Auth Status Total   Visits  Start date  Expiration date PT/OT + Visit Limit? Co-Insurance   24 none bomn 24 bomn No                                           Visit/Unit Tracking  AUTH Status: none Date IE - 24            Visits  Authed: bomn Used 1               Remaining                         "

## 2024-01-29 ENCOUNTER — OFFICE VISIT (OUTPATIENT)
Age: 69
End: 2024-01-29
Payer: COMMERCIAL

## 2024-01-29 ENCOUNTER — APPOINTMENT (OUTPATIENT)
Age: 69
End: 2024-01-29
Payer: COMMERCIAL

## 2024-01-29 DIAGNOSIS — R20.0 NUMBNESS: Primary | ICD-10-CM

## 2024-01-29 PROCEDURE — 97530 THERAPEUTIC ACTIVITIES: CPT

## 2024-01-29 PROCEDURE — 96125 COGNITIVE TEST BY HC PRO: CPT
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
## 2024-01-31 ENCOUNTER — OFFICE VISIT (OUTPATIENT)
Dept: NEUROLOGY | Facility: CLINIC | Age: 69
End: 2024-01-31
Payer: COMMERCIAL

## 2024-01-31 ENCOUNTER — APPOINTMENT (OUTPATIENT)
Age: 69
End: 2024-01-31
Payer: COMMERCIAL

## 2024-01-31 VITALS
SYSTOLIC BLOOD PRESSURE: 118 MMHG | WEIGHT: 181.4 LBS | TEMPERATURE: 97.8 F | BODY MASS INDEX: 35.43 KG/M2 | HEART RATE: 87 BPM | DIASTOLIC BLOOD PRESSURE: 70 MMHG | OXYGEN SATURATION: 98 %

## 2024-01-31 DIAGNOSIS — R60.0 BILATERAL LOWER EXTREMITY EDEMA: ICD-10-CM

## 2024-01-31 DIAGNOSIS — E78.2 MIXED HYPERLIPIDEMIA: ICD-10-CM

## 2024-01-31 DIAGNOSIS — I65.21 ATHEROSCLEROSIS OF RIGHT CAROTID ARTERY: ICD-10-CM

## 2024-01-31 DIAGNOSIS — E11.9 TYPE 2 DIABETES MELLITUS WITHOUT COMPLICATION, WITHOUT LONG-TERM CURRENT USE OF INSULIN (HCC): ICD-10-CM

## 2024-01-31 DIAGNOSIS — G62.9 NEUROPATHY: ICD-10-CM

## 2024-01-31 DIAGNOSIS — I63.50 RIGHT PONTINE STROKE (HCC): Primary | ICD-10-CM

## 2024-01-31 DIAGNOSIS — I10 BENIGN ESSENTIAL HYPERTENSION: ICD-10-CM

## 2024-01-31 DIAGNOSIS — G47.33 OSA (OBSTRUCTIVE SLEEP APNEA): ICD-10-CM

## 2024-01-31 PROCEDURE — 99215 OFFICE O/P EST HI 40 MIN: CPT

## 2024-01-31 PROCEDURE — 1159F MED LIST DOCD IN RCRD: CPT

## 2024-01-31 PROCEDURE — 3074F SYST BP LT 130 MM HG: CPT

## 2024-01-31 PROCEDURE — 1160F RVW MEDS BY RX/DR IN RCRD: CPT

## 2024-01-31 PROCEDURE — 3078F DIAST BP <80 MM HG: CPT

## 2024-01-31 RX ORDER — GABAPENTIN 100 MG/1
100 CAPSULE ORAL
Qty: 30 CAPSULE | Refills: 2 | Status: SHIPPED | OUTPATIENT
Start: 2024-01-31

## 2024-01-31 NOTE — PROGRESS NOTES
Review of Systems   Constitutional:  Negative for appetite change, fatigue and fever.   HENT: Negative.  Negative for hearing loss, tinnitus, trouble swallowing and voice change.    Eyes: Negative.  Negative for photophobia, pain and visual disturbance.   Respiratory: Negative.  Negative for shortness of breath.    Cardiovascular: Negative.  Negative for palpitations.   Gastrointestinal: Negative.  Negative for nausea and vomiting.   Endocrine: Negative.  Negative for cold intolerance.   Genitourinary: Negative.  Negative for dysuria, frequency and urgency.   Musculoskeletal:  Positive for back pain. Negative for gait problem, myalgias, neck pain and neck stiffness.   Skin: Negative.  Negative for rash.   Allergic/Immunologic: Negative.    Neurological:  Positive for weakness and numbness. Negative for dizziness, tremors, seizures, syncope, facial asymmetry, speech difficulty, light-headedness and headaches.   Hematological: Negative.  Does not bruise/bleed easily.   Psychiatric/Behavioral:  Positive for confusion. Negative for hallucinations and sleep disturbance.    All other systems reviewed and are negative.

## 2024-01-31 NOTE — LETTER
January 31, 2024     Patient: Theresa Valerio   YOB: 1955   Date of Visit: 1/31/2024       To Whom It May Concern:    It is my medical opinion that Theresa Valerio may return to work on Monday, 2/5/24 without restrictions.     If you have any questions or concerns, please don't hesitate to call.         Sincerely,        Ankur Lu PA-C    CC: No Recipients

## 2024-01-31 NOTE — ASSESSMENT & PLAN NOTE
Patient was seen in the office today as a hospital follow up for stroke.  Her MRI revealed small acute right pontine infarct.  Etiology most likely  in setting of chronically unmanaged vascular risk factors.  Patient doing well at home overall and continues to work with PT/OT for management of ongoing post stroke deficits.  She has been checking her blood pressures twice daily and has been taking all of her medications as prescribed.   For ongoing stroke prevention continue: aspirin and Lipitor   Discussed the importance of antiplatelet management with the patient to prevent future strokes.   Recommend to check blood pressure occasionally away from the doctor's office to make sure that those numbers are typically less than 130/80.  If they are frequently higher than that, we recommend checking a little more often and to follow up with primary care team   Will defer to primary care team for monitoring of cholesterol panel and blood sugar numbers with target LDL cholesterol of less than 70 and hemoglobin A1c less than 7%.  Will refer to endocrinology in the future if needed  Referral to cardiology placed for follow-up echocardiogram  Recommend following a low salt, mediterranean diet   Recommend routine physical exercise as tolerated   Patient declined referral for home sleep study at this time to rule out SHANE  For the LLE nerve pain, start Gabapentin 100mg at bedtime. Discussed increasing if tolerating well without any side effects

## 2024-01-31 NOTE — PATIENT INSTRUCTIONS
Stroke:  - For ongoing stroke prevention continue: Aspirin 81mg daily.  Lipitor 40mg daily.  Continue Blood pressure control medications as prescribed by your family doctor.     - Discussed the importance of antiplatelet management with the patient to prevent future strokes.   - Recommend to check blood pressure occasionally away from the doctor's office to make sure that those numbers are typically less than 130/80.  If they are frequently higher than that, we recommend checking a little more often and to follow up with primary care team   - Will defer to primary care team for monitoring of cholesterol panel and blood sugar numbers with target LDL cholesterol of less than 70 and hemoglobin A1c less than 7%. May consider referral to endocrinology in the future if needed.   - Referral to cardiology placed for follow-up echocardiogram  - Recommend following a low salt, mediterranean diet   - Recommend routine physical exercise as tolerated     Nerve pain:  Start Gabapentin 100mg at bedtime.  Please reach out in a week or so to let us know if it's helping with your pain.  We can discuss increasing if tolerating well without any side effects.    We will plan for her to return to the office in 4 months time to see on of the APPs, but would be happy to see him sooner if the need should arise.  If she has any symptoms concerning for TIA or stroke including sudden painless loss of vision or double vision, difficulty speaking or swallowing, vertigo/room spinning that does not quickly resolve, or weakness/numbness/loss of coordination affecting 1 side of the face or body, she should proceed by ambulance to the nearest emergency room immediately.

## 2024-01-31 NOTE — PROGRESS NOTES
Patient ID: Theresa Valerio is a 68 y.o. female who presents to the [facility].    Assessment/Plan:  Small right pontine stroke with residual dysesthesias  Patient was seen in the office today as a hospital follow up for stroke.  Her MRI revealed small acute right pontine infarct.  Etiology most likely  in setting of chronically unmanaged vascular risk factors.  Patient doing well at home overall and continues to work with PT/OT for management of ongoing post stroke deficits.  She has been checking her blood pressures twice daily and has been taking all of her medications as prescribed.   For ongoing stroke prevention continue: aspirin and Lipitor   Discussed the importance of antiplatelet management with the patient to prevent future strokes.   Recommend to check blood pressure occasionally away from the doctor's office to make sure that those numbers are typically less than 130/80.  If they are frequently higher than that, we recommend checking a little more often and to follow up with primary care team   Will defer to primary care team for monitoring of cholesterol panel and blood sugar numbers with target LDL cholesterol of less than 70 and hemoglobin A1c less than 7%.  Will refer to endocrinology in the future if needed  Referral to cardiology placed for follow-up echocardiogram  Recommend following a low salt, mediterranean diet   Recommend routine physical exercise as tolerated   Patient declined referral for home sleep study at this time to rule out SHANE  For the LLE nerve pain, start Gabapentin 100mg at bedtime. Discussed increasing if tolerating well without any side effects      We will plan for her to return to the office in 4 months time to see one of the APPs, but would be happy to see her sooner if the need should arise.  If she has any symptoms concerning for TIA or stroke including sudden painless loss of vision or double vision, difficulty speaking or swallowing, vertigo/room  "spinning that does not quickly resolve, or weakness/numbness/loss of coordination affecting 1 side of the face or body, she should proceed by ambulance to the nearest emergency room immediately.       Subjective:    HPI     For Review/Hospital Course:    Patient reports she woke up in the morning with complete left-sided numbness.  She googled her symptoms and also spoke with her daughter about the symptoms and decided to go to the hospital.   Per Neurology consult inpatient, \"Theresa Valerio is a 68 y.o.  female with HTN, DM, HLD, and medication non-compliance who presented to [facility] ED 12/8/23 with stroke like symptoms. Patient reports that she woke up this morning around 0800 with \"pins and needles\" on her entire left side; face, arm, and leg. She additionally felt lightheaded and off balance without focal weakness. She also felt that her speech was slurred for about 1 minute and resolved spontaneously without intervention. No vision changes reported. Stroke alert initiated with NIHSS 1 for left sided sensory deficit. /80, . CTH/CTA without acute intracranial findings. Not a candidate for TNK or thrombectomy. Admitted on stroke pathway for additional work-up\"     Imaging:  -CTH: No acute intracranial CT abnormality.   -CTA:1.  No intracranial large vessel occlusion. 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 2.  Anatomic variant separate origins of left internal and external carotid arteries. Also normal variant retropharyngeal course of internal carotid arteries. 3.  No hemodynamically significant stenosis of the cervical carotid and vertebral arteries.  -MRI: 1. Small focus of acute/recent infarction in the right hemipons. 2. Trace, chronic microangiopathy.  -Echo: No PFO. EF =45%. Mild global hypokinesis.  Systolic function mildly reduced and diastolic function is mildly abnormal.     Recommendations:  Patient to continue asa 81mg daily and 21 days of plavix 75mg daily.  Patient was " discharged home with recommended outpatient neurology referral.      Interval History:    Patient completed her 21 day course of DAPT with asa/plavix.  She remains on asa monotherapy at this time.  She has been compliant with taking her asa, lipitor, and blood pressure/diabetes medications at this time.  She reports that she is still light-headed and occasionally off-balance. Will have intermittent vertigo where she feels a room-spinning sensation but it will resolves within a few seconds.  She endorses ongoing problems with her memory, particularly she will go into the kitchen for something and forget why she went there.  She has occasional word-finding difficulties but will usually think of the word when given some time.  She reports that she is not sleeping as well at night.  She will sleep for a few hours at a time and then wake up due to pain in her left leg as well as her back on occasion and will have trouble falling back to sleep.  She has been experiencing patchy numbness and tingling on the left side of her body that has been improving since the stroke but it's still persistent from the knee down.        She recently passed her fit to drive test with OT and reports that she is cleared to drive.  She has been using [employer] to continue working on mind exercises as recommended by the OT and she continues to do outpatient PT and OT which she finds to be beneficial.  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     New stroke symptoms/residual symptoms:    No new stroke-like symptoms.      Numbness and tingling sensation on the LLE from knee down which can be painful at times especially at night time.  She feels off-balance and has occasional vertigo.      Stroke Etiology and Risk Factor modification:    This was a(n) thrombotic stroke, most likely related to Small Vessel/microvascular disease.     Stroke risk factors were evaluated including:     AP/AC  therapy: Asa 81mg. She was not on this medication prior to the stroke.      Statin therapy: Lipitor 40mg. She started taking it ever since the stroke. She was not taking it prior to.     Blood pressure today and as of late: 118/70.  She has been checking it at home twice daily and generally sees numbers under 130/80.  One or two readings 140's/100's.      Most recent LDL: 152mg/dL    Most recent hemoglobin A1C: 8.2%    Cardiology evaluation? Any cardiac monitoring required?: Patient had echo completed in the hospital.  Cardiology referral placed to see if they would like to follow her for her slightly decreased systolic function and mild global hypokinesis.    Endocrinology evaluation? Following proper glycemic treatment/diet? Patient reports that she has seen an endocrinologist in the past but her PCP is monitoring and managing her diabetes at the moment.      Lifestyle history/modifications:    Diet/Exercise regimen: Watching carbs.  Daughter follows Mediterranean diet and she cooks for her from time to time.     Any physical therapy, occupational therapy or speech therapy performed/required at this time?: Yes. Patient working with both PT/OT at this time.     Any difficulty with sleep? Yes. Difficult falling asleep and now staying asleep as well.  Doesn't sleep more than a few hours at a shot because she is waking up with nerve pain in her left leg and some pain in her back.     Any history of sleep apnea apnea? CPAP compliance?: None. Patient not interested in home sleep study at this time.     Post-stroke depression/anxiety? None reported.  Patient coping well.     Any history of smoking? None    Additional History:      Lab Results   Component Value Date/Time    CHOLESTEROL 225 (H) 12/09/2023 04:49 AM     Lab Results   Component Value Date/Time    TRIG 165 (H) 12/09/2023 04:49 AM     Lab Results   Component Value Date/Time    HDL 40 (L) 12/09/2023 04:49 AM     Lab Results   Component Value Date/Time    LDLCALC  152 (H) 12/09/2023 04:49 AM       Lab Results   Component Value Date/Time    HGBA1C 8.2 (H) 12/09/2023 04:49 AM    HGBA1C 7.2 (H) 02/13/2023 08:22 AM     Lab Results   Component Value Date/Time     12/09/2023 04:49 AM     (H) 02/13/2023 08:22 AM     (H) 10/08/2020 07:24 AM           Past Medical History:   Diagnosis Date    Diabetes mellitus (HCC)     Hyperlipidemia     Hypertension        Current Outpatient Medications:     albuterol (PROVENTIL HFA,VENTOLIN HFA) 90 mcg/act inhaler, Inhale 2 puffs 4 (four) times a day As needed, Disp: , Rfl:     amLODIPine (NORVASC) 5 mg tablet, Take 1 tablet (5 mg total) by mouth daily at bedtime, Disp: 90 tablet, Rfl: 5    aspirin 81 mg chewable tablet, Chew 1 tablet (81 mg total) daily Please buy over the counter Do not start before December 10, 2023., Disp: , Rfl: 0    atorvastatin (LIPITOR) 40 mg tablet, Take 1 tablet (40 mg total) by mouth daily, Disp: 40 tablet, Rfl: 1    Blood Glucose Monitoring Suppl (OneTouch Verio) w/Device KIT, Test blood sugar once daily., Disp: 1 kit, Rfl: 2    cyanocobalamin (VITAMIN B-12) 1000 MCG tablet, TAKE 1 TABLET BY MOUTH EVERY DAY, Disp: , Rfl:     Empagliflozin-metFORMIN HCl ER (Synjardy XR)  MG TB24, Take 10-1,000 mL by mouth in the morning, Disp: 90 tablet, Rfl: 3    gabapentin (Neurontin) 100 mg capsule, Take 1 capsule (100 mg total) by mouth daily at bedtime, Disp: 30 capsule, Rfl: 2    glucose blood (OneTouch Verio) test strip, Test blood sugar once daily., Disp: 100 strip, Rfl: 5    hydrOXYzine HCL (ATARAX) 50 mg tablet, Take 50 mg by mouth daily at bedtime, Disp: , Rfl:     Lancets 33G MISC, Test blood sugar once daily., Disp: 100 each, Rfl: 5    lifitegrast (Xiidra) 5 % op solution, INSTILL 1 DROP INTO BOTH EYES TWICE A DAY, Disp: , Rfl:     montelukast (SINGULAIR) 10 mg tablet, Take 10 mg by mouth daily, Disp: , Rfl:     naproxen (NAPROSYN) 500 mg tablet, Take 1 tablet (500 mg total) by mouth 2 (two) times  a day with meals, Disp: 30 tablet, Rfl: 0    nystatin-triamcinolone (MYCOLOG-II) cream, Apply topically 2 (two) times a day, Disp: 30 g, Rfl: 0    semaglutide, 1 mg/dose, (Ozempic) 4 mg/3 mL injection pen, Inject 1 mg under the skin, Disp: , Rfl:     valsartan-hydrochlorothiazide (DIOVAN-HCT) 160-25 MG per tablet, Take 1 tablet by mouth daily, Disp: 30 tablet, Rfl: 0    melatonin 3 mg, Take 3 mg by mouth (Patient not taking: Reported on 1/4/2024), Disp: , Rfl:     nystatin (MYCOSTATIN) powder, Apply topically 2 (two) times a day (Patient not taking: Reported on 1/31/2024), Disp: 30 g, Rfl: 0     Objective:    Physical Exam:                                                                 Vitals:            Constitutional:    /70 (BP Location: Left arm, Patient Position: Sitting, Cuff Size: Adult)   Pulse 87   Temp 97.8 °F (36.6 °C) (Temporal)   Wt 82.3 kg (181 lb 6.4 oz)   SpO2 98%   BMI 35.43 kg/m²   BP Readings from Last 3 Encounters:   01/31/24 118/70   01/15/24 118/70   01/04/24 128/68     Pulse Readings from Last 3 Encounters:   01/31/24 87   01/15/24 84   01/04/24 101         Well developed, well nourished, well groomed. No dysmorphic features.  Bilateral mild edema in lower extremities +1 pitting.       Psychiatric:  Normal behavior and appropriate affect        Neurological Examination:      Mental Status: The patient was awake, alert, attentive, and was a good historian with no evidence of language dysfunction.    Cranial nerves: II-XII tested.  WNL and symmetric bilaterally.    Reflexes: Biceps, brachioradialis, and patellar are +2.  Sensation: Sensation is intact bilaterally to light touch upper extremities.  LLE decreased temperature and pinprick sensation on the shin and lateral aspect of the lower leg.  Increased sensitivity to light touch in left shin and lateral aspect of her left leg.   Motor: Strength is 5/5 bilaterally at the deltoid, biceps, triceps, quadriceps, and hamstrings.    Cerebellar: Finger-to-nose and finger-nose-finger tests normal bilaterally. Balances with eyes closed (Romberg). Gait is steady with a normal base. Coordination is intact as measured by heel walk and toe walk.     Mental status/cognitive function:   Orientated to time, place and person. Recent and remote memory intact. Attention span and concentration as well as fund of knowledge are appropriate for age. Normal language and spontaneous speech      ROS:  Review of Systems   Constitutional:  Negative for appetite change, fatigue and fever.   HENT: Negative.  Negative for hearing loss, tinnitus, trouble swallowing and voice change.    Eyes: Negative.  Negative for photophobia, pain and visual disturbance.   Respiratory: Negative.  Negative for shortness of breath.    Cardiovascular: Negative.  Negative for palpitations.   Gastrointestinal: Negative.  Negative for nausea and vomiting.   Endocrine: Negative.  Negative for cold intolerance.   Genitourinary: Negative.  Negative for dysuria, frequency and urgency.   Musculoskeletal:  Positive for back pain. Negative for gait problem, myalgias, neck pain and neck stiffness.   Skin: Negative.  Negative for rash.   Allergic/Immunologic: Negative.    Neurological:  Positive for weakness and numbness. Negative for dizziness, tremors, seizures, syncope, facial asymmetry, speech difficulty, light-headedness and headaches.   Hematological: Negative.  Does not bruise/bleed easily.   Psychiatric/Behavioral:  Positive for confusion. Negative for hallucinations and sleep disturbance.    All other systems reviewed and are negative.      Reviewed ROS as entered by medical assistant.     I have spent a total time of 60 minutes on 01/31/24 in caring for this patient including Diagnostic results, Prognosis, Risks and benefits of tx options, Instructions for management, Patient and family education, Importance of tx compliance, Risk factor reductions, Impressions, Counseling / Coordination  of care, Documenting in the medical record, Reviewing / ordering tests, medicine, procedures  , and Obtaining or reviewing history  .       Author: JOCELIN Fernando  1/31/2024 12:41 PM

## 2024-02-05 ENCOUNTER — OFFICE VISIT (OUTPATIENT)
Age: 69
End: 2024-02-05
Payer: COMMERCIAL

## 2024-02-05 DIAGNOSIS — R20.0 NUMBNESS: Primary | ICD-10-CM

## 2024-02-05 DIAGNOSIS — I63.50 RIGHT PONTINE STROKE (HCC): ICD-10-CM

## 2024-02-05 PROCEDURE — 97112 NEUROMUSCULAR REEDUCATION: CPT

## 2024-02-05 PROCEDURE — 97530 THERAPEUTIC ACTIVITIES: CPT

## 2024-02-05 NOTE — PROGRESS NOTES
"Daily Note     Today's date: 2024  Patient name: Theresa Valerio  : 1955  MRN: [id number]  Referring provider: Sherri Ochoa CRNP  Dx:   Encounter Diagnosis     ICD-10-CM    1. Numbness  R20.0       2. Small right pontine stroke with residual dysesthesias  I63.50           Start Time: 0845  Stop Time: 930  Total time in clinic (min): 45 minutes    Subjective: She reports she is doing well and had an appt with her neurologist recently. She reports random and very infrequent episodes of \"vertigo\" that last for ~2 seconds.      Objective: See treatment diary below    - High knee march with SLS 3s iso, carrying 10# TT  - Retro amb 4 laps   - Lateral amb 4 laps, 5# AW on LLE, 4# AW on RLE  - Walking at self-selected pace 100'x3, 5# AW on LLE, 4# AW on RLE  - Walking at increased speed 100'x2, 5# AW on LLE, 4# AW on RLE  - Agility ladder: in/out, fwd, two step, 4 laps ea  - Tandem wlaking fwd/bwd 4 laps    Assessment: Tolerated treatment well. Her endurance is improving because she needs less rest breaks.  No significant LOBs noted during session. She still has difficulty with narrow ALICJA and SLS conditions. Patient demonstrated fatigue post treatment and would benefit from continued PT to maximize function.       Plan: Continue per plan of care.      POC expires Auth Status Total   Visits  Start date  Expiration date PT/OT + Visit Limit? Co-Insurance   24 none bomn 24 bomn No                                           Visit/Unit Tracking  AUTH Status: none Date IE - 24 2/5           Visits  Authed: bomn Used 1               Remaining                         "

## 2024-02-05 NOTE — PROGRESS NOTES
"  POC expires Auth Status Total   Visits  Start date  Expiration date PT/OT + Visit Limit? Co-Insurance   3/22/24 required  23  BOMN No                                           Visit/Unit Tracking  AUTH Status:  Date  (PN)  (FTD)          Visits  Authed:  Used 1 1 1 1 1 1            Remaining                  PLAN OF CARE START:23  PLAN OF CARE END: 3/22/2024  PROGRESS NOTE DUE: 24 (PN on schedule)   FREQUENCY: 1-2 x's per week  PRECAUTIONS:none   DIAGNOSIS: Numbness (stroke like symptoms)  VISITS: 6      Today's Date: 2024  Patient Name: Theresa Valerio  : 1955  MRN: [id number]  Referring Provider: Sherri Ochoa CRNP  Dx: Numbness [R20.0]    Time IN 8:00 Time OUT 8:45    Daily Note      Subjective: \"I am going back to work tomorrow\"      Objective: See treatment below.  NMRE:  *120 number board-placing tiles>board and key placed in front and tiles placed on far L (first bout) far R (second bout). Pt required to alternating head/body turns to L/R with emphasis on functional driving skills.  Pt required to use tripod pinch on clothespin while reaching behind with 3 lbs added wrist weights bilaterally for increased proprioceptive input. Pt required increased time.    TA:   *[employer] worksheet. Pt required min cognitive support to recall east and west to start task. Pt unable to complete task and issued for Homework post session.     Assessment: Tolerated treatment well despite pt report that the tile task was made more challenging to her due to the application of 3# wrist weight. Pt able to complete good tripod grasp/release coordination of clothespin with minimal tile drops. Pt would benefit from continued OT services to address decreased hand  strength, pincer, tripod and lateral pinch strength, L UE strength, fine motor coordination and cognitive skills.       Plan: Continued skilled OT per POC.          "

## 2024-02-07 ENCOUNTER — APPOINTMENT (OUTPATIENT)
Age: 69
End: 2024-02-07
Payer: COMMERCIAL

## 2024-02-09 ENCOUNTER — APPOINTMENT (OUTPATIENT)
Age: 69
End: 2024-02-09
Payer: COMMERCIAL

## 2024-02-09 ENCOUNTER — EVALUATION (OUTPATIENT)
Age: 69
End: 2024-02-09
Payer: COMMERCIAL

## 2024-02-09 DIAGNOSIS — R20.0 NUMBNESS: Primary | ICD-10-CM

## 2024-02-09 DIAGNOSIS — I63.50 RIGHT PONTINE STROKE (HCC): ICD-10-CM

## 2024-02-09 PROCEDURE — 97112 NEUROMUSCULAR REEDUCATION: CPT

## 2024-02-09 NOTE — PROGRESS NOTES
PT Re-Evaluation          POC expires Auth Status Total   Visits  Start date  Expiration date PT/OT + Visit Limit? Co-Insurance   24 none bomn 24 bomn No                                           Visit/Unit Tracking  AUTH Status: none Date IE - 24 2          Visits  Authed: bomn Used 1 2 3 4 5           Remaining                             Today's date: 2024  Patient name: Theresa Valerio  : 1955  MRN: [id number]  Referring provider: Sherri Ochoa CRNP  Dx:   Encounter Diagnosis     ICD-10-CM    1. Numbness  R20.0       2. Small right pontine stroke with residual dysesthesias  I63.50             Assessment  Assessment details: Patient is a 68 y.o. Female who presents to skilled outpatient PT after a CVA who is having continued L sided numbness and imbalance. She presents today for a progress report. She continues experience a disturbance in her sensation and the numbness fluctuates on her L side. She has made gains in all outcome measures (5xSTS, TUG, 10 mWT, Temple, FGA, and 6 MWT). She has met all her STGs and is progressing towards achieving all LTGs. Despite the improvements she has made, she continues to have the following impairments: abnormal coordination, abnormal gait, activity intolerance, impaired balance, impaired physical strength, and abnormal muscle firing. She can continue to benefit from skilled PT interventions to address her deficits and promote the maximal level of function and to promote a full return to her PLOF.      Outcome Measures Initial Eval  24 - [place]   5xSTS 14.09 sec 10.8 sec   TUG  - Regular   9.48 sec   7.87 sec   10 meter 1.04 m/s 1.26 m/s   TEMPLE 49/56 53/56   FGA /30             6MWT 970 ft 1000'                 Impairments: Abnormal coordination, Abnormal gait, Activity intolerance, Impaired balance, Impaired physical strength, Lacks appropriate HEP,  Pain with function, Safety issue, Abnormal movement, Abnormal muscle firing  Understanding of Dx/Px/POC: Good  Prognosis: Good    Patient verbalized understanding of POC.       Please contact me if you have any questions or recommendations. Thank you for the referral and the opportunity to share in Theresa Valerio's care.      Plan  Plan details: Skilled PT to address pt goals of improving balance and promoting a return to work  Patient would benefit from: Skilled PT  Planned modality interventions: Biofeedback, Cryotherapy, TENS, Thermotherapy  Planned therapy interventions: Abdominal trunk stabilization, ADL training, Balance, Balance/WB training, Breathing training, Body mechanics training, Coordination, Functional ROM exercises, Gait training, HEP, Joint Mobilization, Manual Therapy, Villalta taping, Motor coordination training, Neuromuscular re-education, Patient education, Postural training, Strengthening, Stretching, Therapeutic activities, Therapeutic exercises, Therapeutic training, Transfer training, Activity modification, Work reintegration  Frequency:  1-3x/wk  Duration in weeks: 12  Plan of Care beginning date: 1/8/24  Plan of Care expiration date: 12 weeks - 4/1/2024  Treatment plan discussed with: Patient       Goals  Short Term Goals (4 weeks):    - Patient will complete Temple - MET  - Patient will complete 6 MWT - MET  - Patient will be independent in basic HEP 2-3 weeks - MET  - Patient will improve 5xSTS score by 2.3 seconds to demonstrate increased power production - MET  - Patient will increase FGA score by 4 points to meet MDC - MET    Long Term Goals (12 weeks):  - Patient will be independent in a comprehensive home exercise program - PROGRESSING  - Patient will improve gait speed by 0.18 m/s to increase speed for crossing streets/community access - MET  - Patient will improve TEMPLE by 6 points in order to demonstrate a reduced fall risk - PROGRESSING  - Patient will improve scoring on FGA by  "4 points to demonstrate a decreased fall risk - PROGRESSING  - Patient will improve 6 Minute Walk Test score by 190 feet to promote increased endurance - PROGRESSING  - Patient will report going on walks at least 3 days per week to promote increased endurance - PROGRESSING  - Patient will be able to ascend/descend stairs reciprocally with 1 UE assist to improve safety and functional mobility - MET    Patient reported goals  - Patient stated she wants to make her balance \"better\"  - Patient would like to return to work    Cut off score    All date taken from [facility] Neuro Section or Rehab Measures      Velásquez/56  MDC: 6 pts  Age Norms:  60-69: M - 55   F - 55  70-79: M - 54   F - 53  80-89: M - 53   F - 50 5xSTS: Landon et al 2010  MDC: 2.3 sec  Age Norms:  60-69: 11.1 sec  70-79: 12.6 sec  80-89: 14.8 sec   TUG  MDC: 4.14 sec  Cut off score:  >13.5 sec community dwelling adults  >32.2 frail elderly  <20 I for basic transfers  >30 dependent on transfers 10 Meter Walk Test: Kolton et al 2011  MDC: 0.18 m/s  20-29: M - 1.35 m   F - 1.34 m  30-39: M - 1.43 m   F - 1.34 m  40-49: M - 1.43 m   F - 1.39 m  50-59: M - 1.43 m   F - 1.31 m  60-69: M - 1.34 m   F - 1.24 m  70-79: M - 1.26 m   F - 1.13 m  80-89: M - 0.97 m   F - 0.94 m    Household Ambulator < 0.4 m/s  Limited Community Ambulator 0.4 - 0.8 m/s  Community Ambulator 0.8 - 1.2 m/s  Safely cross the street > 1.2 m/s   FGA  MCID: 4 pts  Geriatrics/community < 22/30 fall risk  Geriatrics/community < 20/30 unexplained falls    DGI  MDC: vestibular - 4 pts  MDC: geriatric/community - 3 pts  Falls risk <19/24 mCTSIB  Norm: 20-60 yrs  Eyes open firm: norm sway 0.21-0.48  Eyes closed firm: norm sway 0.48-0.99  Eyes open foam: norm sway 0.38-0.71  Eyes closed foam: norm sway 0.70-2.22   6 Minute Walk Test  MDC: 190.98 ft  MCID: 164 ft    Age Norms  60-69: M - 1876 ft (571.80 m)  F - 1765 ft (537.98 m)  70-79: M - 1729 ft (527.00 m)  F - 1545 ft (470.92 m)  80-89: " M - 1368 ft (416.97 m)  F - 1286 ft (391.97 m) ABC: Benson & Villalba, 2003  <67% increased risk for falls       Subjective    History of Present Illness  Mechanism of injury: She reports she had a CVA about 1 months ago and she continues to have numbness on the entire L side of her body. She is having difficulty sleeping since she had the stroke.  Primary AD: none  Assist level at home: independent  WC usage: no  PLOF: independent    Pain  Current pain ratin/10  At best pain ratin/10  At worst pain ratin/10  Location: L medial border of scapula  Aggravating factors: none    Social Support  Steps to enter house: 2  Stairs in house: 13 with a railing   Lives in: 2 story home  Lives with: , daughter and her spouse, grandson     Employment status: work for [employer], has not worked for 1 month   Hand dominance: R    Treatments  Previous treatment: none  Current treatment: PT  Diagnostic Testing: MRI    Objective     UE MMT  - R Shoulder Flexion: 4/5  L Shoulder Flexion: 4-/5  - R Shoulder Abduction: 4/5  L Shoulder Abduction: 4-/5  - R Elbow Extension: 4-/5  L Elbow Extension: 3+/5  - R Elbow Flexion: 4/5   L Elbow Flexion: 4-/5 and 4/5    LE MMT  - R Hip Flexion: 4-/5   L Hip Flexion: 3+/5  - R Hip Extension: 4/5   L Hip Extension: 4-/5  - R Hip Abduction: 4/5   L Hip Abduction: 4/5  - R Hip Adduction: 4/5   L Hip Adduction: 4/5  - R Knee Extension: 4/5  L Knee Extension: 4-/5  - R Knee Flexion: 4-/5   L Knee Flexion: 4-/5  - R Ankle DF: 4/5   L Ankle DF: 4/5  - R Ankle PF: 4/5   L Ankle PF: 4/5    Sensation  - Light touch: intact    Vitals  HR 66 bpm  /87 (seated, L arm, automatic)    Coordination  - Heel to Shin: intact  - Alternate Toe Taps: abnormal  - Finger to Nose: intact    Reflexes/Clonus  - Clonus: No    OT Screen  - Difficulty w/ clothing fasteners: Yes  - Difficulty w/ bathing: No  - Difficulty w/ dressing: No  - Difficulty w/ toileting: No    - Difficulty w/ medication  management: No    Gait  - Abnormalities: decreased speed, varying step length bilaterally, wide ALICJA       Outcome Measures Initial Eval  1/8/24 2/9 - [place]   5xSTS 14.09 sec 10.8 sec   TUG  - Regular   9.48 sec   7.87 sec   10 meter 1.04 m/s 1.26 m/s   MAVERICK 49/56 53/56   JUAN 18/30 25/30             6MWT 970 ft 1000'                     Precautions: hypertension  Past Medical History:   Diagnosis Date    Diabetes mellitus (HCC)     Hyperlipidemia     Hypertension

## 2024-02-12 ENCOUNTER — OFFICE VISIT (OUTPATIENT)
Age: 69
End: 2024-02-12
Payer: COMMERCIAL

## 2024-02-12 DIAGNOSIS — R20.0 NUMBNESS: Primary | ICD-10-CM

## 2024-02-12 DIAGNOSIS — I63.50 RIGHT PONTINE STROKE (HCC): ICD-10-CM

## 2024-02-12 PROCEDURE — 97112 NEUROMUSCULAR REEDUCATION: CPT

## 2024-02-12 NOTE — PROGRESS NOTES
"Daily Note     Today's date: 2024  Patient name: Theresa Valerio  : 1955  MRN: [id number]  Referring provider: Sherri Ochoa CRNP  Dx:   Encounter Diagnosis     ICD-10-CM    1. Numbness  R20.0       2. Small right pontine stroke with residual dysesthesias  I63.50             Start Time: 0845  Stop Time: 0930  Total time in clinic (min): 45 minutes    Subjective: She states she is doing well and there have been no changes. She does report bilateral hamstring soreness and that she can now feel her knee.      Objective: See treatment diary below    - Semi-tandem amb 3 laps  - Tandem walking fwd 4 laps  - Lateral amb on foam 4 laps in // bars  - Hurdles fwd/lat 3 laps  - Fwd/bwd amb 5 laps  - River rocks 5 laps  - FTEO firm 30\"x2  - FTEC firm 30\"x2  - FTEO foam 30\"x1  - FTEC foam 30\"x1    Assessment: Tolerated treatment well. No significant LOBs noted during session. She still has difficulty with narrow ALICJA and SLS conditions. She is more limited in balance and endurance and this is a challenge for her functional mobility/activity. Patient demonstrated fatigue post treatment and would benefit from continued PT to maximize function.       Plan: Continue per plan of care.      POC expires Auth Status Total   Visits  Start date  Expiration date PT/OT + Visit Limit? Co-Insurance   24 none bomn 24 bomn No                                           Visit/Unit Tracking  AUTH Status: none Date IE - 24 2/5 2/ PN          Visits  Authed: bomn Used 1               Remaining                         "

## 2024-02-12 NOTE — PROGRESS NOTES
"  POC expires Auth Status Total   Visits  Start date  Expiration date PT/OT + Visit Limit? Co-Insurance   3/22/24 required  23  BOMN No                                           Visit/Unit Tracking  AUTH Status:  Date  (PN)  (FTD)         Visits  Authed:  Used 1 1 1 1 1 1   1         Remaining                  PLAN OF CARE START:23  PLAN OF CARE END: 3/22/2024  PROGRESS NOTE DUE: 24 (PN on schedule)   FREQUENCY: 1-2 x's per week  PRECAUTIONS:none   DIAGNOSIS: Numbness (stroke like symptoms)  VISITS: 7      Today's Date: 2024  Patient Name: Theresa Valerio  : 1955  MRN: [id number]  Referring Provider: Sherri Ochoa CRNP  Dx: Numbness [R20.0]      Daily Note      Subjective: \"You're multi-tasking me\". Pt reported that she felt s successful return to part-time work last week.       Objective: See treatment below.  NMRE:  *Multimatrix Block Cubes with setup to identify, locate and transport ordered symbols placed on top of blocks to an open (letter) block based on external key following L>R and from top to bottom. Focus on V,P executive reasoning, sequencing, alternating attention and working memory. Pt required to alternating head/body turns to L/R with emphasis on functional driving skills.  Pt required to use tripod pinch on clothespin while reaching behind. #3 lbs wrist weights added for increased challenge and proprioceptive input. Pt completed one round with her L UE and one round with her  R UE.    Assessment: Tolerated treatment well and was successful with task She required min cognitive support to correct 2-3 mistakes, increased time to keep track of  her place on paper key and required increased time to complete the tasks. Pt task was made more challenging to her due to the application of 3# wrist weight which pt tolerated well. Pt able to complete good tripod grasp/release coordination of clothespin without dropping cubes. Pt would benefit " from continued OT services to address decreased hand  strength, pincer, tripod and lateral pinch strength, L UE strength, fine motor coordination and cognitive skills.     Plan: Continued skilled OT per POC.

## 2024-02-14 ENCOUNTER — APPOINTMENT (OUTPATIENT)
Age: 69
End: 2024-02-14
Payer: COMMERCIAL

## 2024-02-16 ENCOUNTER — OFFICE VISIT (OUTPATIENT)
Age: 69
End: 2024-02-16
Payer: COMMERCIAL

## 2024-02-16 DIAGNOSIS — R20.0 NUMBNESS: Primary | ICD-10-CM

## 2024-02-16 DIAGNOSIS — I63.50 RIGHT PONTINE STROKE (HCC): ICD-10-CM

## 2024-02-16 PROCEDURE — 97112 NEUROMUSCULAR REEDUCATION: CPT

## 2024-02-16 NOTE — PROGRESS NOTES
Daily Note     Today's date: 2024  Patient name: Theresa Valerio  : 1955  MRN: [id number]  Referring provider: Sherri Ochoa CRNP  Dx:   No diagnosis found.                   Subjective: She states she is doing well and there have been no changes.       Objective: See treatment diary below    NMR: applied 4# aw BLE  - Tandem walking fwd 4 laps  - HKM with 3s iso 3 laps   - Lateral amb on foam 4 laps   - Agility ladder: in/out, fwd, two step, 4 laps ea  - Hurdles fwd/lat 3 laps    Assessment: Tolerated treatment well. No significant LOBs noted during session. She still has difficulty with narrow ALICJA and SLS conditions. She is more limited in balance and endurance and this is a challenge for her functional mobility/activity. Patient demonstrated fatigue post treatment and would benefit from continued PT to maximize function.       Plan: Continue per plan of care.      POC expires Auth Status Total   Visits  Start date  Expiration date PT/OT + Visit Limit? Co-Insurance   24 none bomn 24 bomn No                                           Visit/Unit Tracking  IE - 24 2 PN

## 2024-02-16 NOTE — PROGRESS NOTES
"  POC expires Auth Status Total   Visits  Start date  Expiration date PT/OT + Visit Limit? Co-Insurance   3/22/24 required  23  BOMN No                                           Visit/Unit Tracking  AUTH Status:  Date  (PN)  (FTD)        Visits  Authed:  Used 1 1 1 1 1 1   1 1        Remaining                  PLAN OF CARE START:23  PLAN OF CARE END: 3/22/2024  PROGRESS NOTE DUE: 24 (PN on schedule)   FREQUENCY: 1-2 x's per week  PRECAUTIONS:none   DIAGNOSIS: Numbness (stroke like symptoms)  VISITS: 8      Today's Date: 2024  Patient Name: Theresa Valerio  : 1955  MRN: [id number]  Referring Provider: Sherri Ochoa CRNP  Dx: Numbness [R20.0]      Daily Note      Subjective: \"Now I am working 6 hours per day.\"      Objective: See treatment below.  NMRE:  *Seated completing sensory re education to effected extremity via deep tissue percussion massager to medicine ball. Pt reports increased sensitivity to palm, volar surface of forearm and finger tips. Pt reports more normalized sensation post activity.     *Seated completing L UE  of connect four pieces using blue resistive tripod pinch to clip connect four tile and place in connect four grid on raised surface. 3 lbs wrist weight added on L UE wrist for increased muscle recruitment.     *Seated completing 2 simultaneous Q-bits semi complex puzzles focusing on /EF skills, alternating attention with proprioceptive input via 3 lbs wrist weight on L UE. Min verbal cues for cubes with diagonal lines. Min difficulty alternating after each cube. 3 lbs wrist weight donned on L UE for improved muscle recruitment.     Assessment: Tolerated treatment well and was successful with task. She required min verbal cues for cognitive integration of strength and coordination tasks. Pt would benefit from continued OT services to address decreased hand  strength, pincer, tripod and lateral pinch strength, L " UE strength, fine motor coordination and cognitive skills.     Plan: Continued skilled OT per POC.

## 2024-02-19 ENCOUNTER — APPOINTMENT (OUTPATIENT)
Age: 69
End: 2024-02-19
Payer: COMMERCIAL

## 2024-02-21 ENCOUNTER — APPOINTMENT (OUTPATIENT)
Age: 69
End: 2024-02-21
Payer: COMMERCIAL

## 2024-02-23 ENCOUNTER — APPOINTMENT (OUTPATIENT)
Age: 69
End: 2024-02-23
Payer: COMMERCIAL

## 2024-02-26 ENCOUNTER — OFFICE VISIT (OUTPATIENT)
Age: 69
End: 2024-02-26
Payer: COMMERCIAL

## 2024-02-26 ENCOUNTER — EVALUATION (OUTPATIENT)
Age: 69
End: 2024-02-26
Payer: COMMERCIAL

## 2024-02-26 DIAGNOSIS — I63.50 RIGHT PONTINE STROKE (HCC): ICD-10-CM

## 2024-02-26 DIAGNOSIS — R20.0 NUMBNESS: Primary | ICD-10-CM

## 2024-02-26 PROCEDURE — 97168 OT RE-EVAL EST PLAN CARE: CPT

## 2024-02-26 PROCEDURE — 97530 THERAPEUTIC ACTIVITIES: CPT

## 2024-02-26 PROCEDURE — 97112 NEUROMUSCULAR REEDUCATION: CPT

## 2024-02-26 NOTE — PROGRESS NOTES
Daily Note     Today's date: 2024  Patient name: Theresa Valerio  : 1955  MRN: [id number]  Referring provider: Sherri Ochoa CRNP  Dx:   Encounter Diagnosis     ICD-10-CM    1. Numbness  R20.0       2. Small right pontine stroke with residual dysesthesias  I63.50               Start Time: 823  Stop Time: 846  Total time in clinic (min): 23 minutes    Subjective: She states she is doing well and there have been no changes.       Objective: See treatment diary below    NMR: applied 4# aw BLE  Tandem walking fwd 3 laps  HKM with 3s iso 3 laps   Lateral amb on foam 6 laps   Bosu ball 1 min x2  Hurdles fwd/lat 3 laps    Assessment: Tolerated treatment well. No significant LOBs noted during session. She still has difficulty with narrow ALICJA and SLS conditions. Progress balance as able. Patient demonstrated fatigue post treatment and would benefit from continued PT to maximize function.       Plan: Continue per plan of care. Possible discharge to [facility] in 1-2 visits.     POC expires Auth Status Total   Visits  Start date  Expiration date PT/OT + Visit Limit? Co-Insurance   24 none bomn 24 bomn No                                           Visit/Unit Tracking  IE - 24 2 PN

## 2024-02-26 NOTE — PROGRESS NOTES
POC expires Auth Status Total   Visits  Start date  Expiration date PT/OT + Visit Limit? 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 Pt currently works 18 hours per week and her goal is to work 24 hours per week. Pt feels confident in her return to drive abilities. Pt reports consistent numbness in distal L UE and LLE although able to complete work and leisure tasks independently. Pt reports improvements in memory I.e) following multi step verbal directions from her boss without external memory aides the first time she is asked. Post assessments, pt completed FDT with effected extremity in less time than uneffected extremity indicating WFL in hand manipulation and fine motor coordination skills. Pt also improved her MoCA score from 20/30 at I.e to 25/30 to date. Pt's hand  strength has remained the same and below age related norms although pt has strengthening HEPs that she reports daily carry over for. Pt has improved her effected UE MMT scores to WFL since I.e. At this time patient has achieved maximal level of functional independence. Pt has demonstrated good understanding of HEPs and has made steady progress in skilled OT services. Recommendation to discharge at this time  with 2 month follow up. Pt educated on progress/therapy process and pt in understanding and agreement of recommendations. Recommending to continue HEP.     Discharge Note      Subjective: I work 18 hours a week this week.       Impairment Observations: Assessed the following this session to assess progress in therapy: JOHN hand  strength, MMT, functional dexterity test, MoCA. All other observations/assessments carried forward from previous evaluation. See below for goal updates.       SARA WESTON Comments Status on IE           UPPER EXTREMITY FUNCTION   Intact Intact Dominant Hand: right include date     /PINCH STRENGTH              Dynamometer     - Gross Grasp 37 lbs- slight decline 30 lbs- improved  abnormal  R UE NORM 50 lbs  L UE NORM 41 lbs    Pinch Meter      - Pincer 6 lbs 6 lbs- improved abnormal  UE NORM 11lbs     - Tripod 10 lbs- improved 7 lbs- improved abnormal  UE NORM 14lbs     - Lateral 12 lbs - improved  13 lbs- improved  abnormal  UE NORM 14lbs      AROM (Seated)         WFL      MMT              Shoulder FF 5/5 5/5     Shoulder Ext 5/5 5/5 improved    Shoulder Abduction 5/5 5/5 improved    Shoulder Adduction 5/5 5/5 Improved     Elbow Flex 5/5 5/5 improved    Elbow Ext 5/5 4+/5     Wrist Flex 5/5 4-/5 Remained(limited by pain)    Wrist Ext 5/5 5/5 Improved     Gross Grasp 4/5 4-/5 Remained       SENSATION         COORDINATION       Opposition WFL WFL     Finger to Nose WFL WFL     Rapid Alternating Movement WFL WFL     9 Hole Peg Test-  assesses dexterity/fine motor coordination  23 seconds 28 seconds abnormal    Pt demonstrates decreased FMC compared to norms for age/sex (20 seconds)     Fxnl Dexterity Test-assesses patient's ability to use the hand for daily tasks requiring a 3-jaw carlos prehension between the fingers and the thumb  29 seconds 27 seconds- improved  normal    Pt demonstrates decreased dexterity compared to norms for age/sex (35 seconds)          [place] Cognitive  Assessment (MoCA)       SCORE Comments                                         COGNITIVE FXN                     MOCA (8.1)       Education Level = 12 years     Visuospatial/executive functioning 4/5 remained    Naming 3/3 remained     Memory: 1st trial: 5/5 remained     Memory: 2nd trial: 5/5 remained     Attention/concentration 2/2 remained     List of letters:  1/1 remained     Serial Seven Subtraction: 3/3 improved   Language/sentence repetition: 2/2  improved    Language Fluency:  0/1 declined   Abstract/Correlational Thinkin/2 Improved     Delayed Recall: 2/5 Improved    Orientation: 6/6 Remained     Memory Index Score 10/15 Improved                                       Total Score 30       MoCA Scoring        Normal: 26+         Mild Cognitive Impairment: 18-25          Moderate Cognitive Impairment: 10-17         Severe Cognitive Impairment: <10   Mild Cognitive Impairment         SHORT TERM GOALS (6-8 weeks)    GOAL  STATUS ON IE  GOAL STATUS    Pt will improve LUE  strength from 28 lbs to 35 lbs as evidenced by the JOHN hand  assessment to improve performance within daily routines and life roles.    20 lbs Discontinue    Pt will improve L UE tripod pinch strength from 6 lbs to 8 lbs as evidenced by the JOHN pinch strength assessment to improve performance within daily routines and life roles.    6 lbs Discontinue    Pt will improve R UE tripod pinch strength from 7 lbs to 9 lbs as evidenced by the JOHN pinch strength assessment to improve performance within daily routines and life roles.  7 lbs Achieved    Pt will improve L UE  fine motor coordination from 28s to WFLs as evidenced by the 9HPT to improve performance with with ADL and iADL occupations.    28 s Discontinue     Pt will improve L UE proximal strength from 4-/5 to 5/5 to improve performance within return to work and IADL occupations.  4- Discontinue     Pt will improve L UE distal strength from 4-/5 to 5/5 to improve  performance within return to work and IADL occupations.  4- Discontinue     Pt will improve delayed recall section of the MoCA from 1 to 3 to improve cognitive performance within daily routines and life roles.    1 Discontinue     LONG TERM GOALS( > 8 weeks)    GOAL  STATUS ON IE  GOAL STATUS    Pt will improve LUE  strength from 20 lbs to 35 lbs as evidenced by the JOHN hand  assessment to improve performance within daily routines and life roles.  20 lbs Discontinue     Pt will improve L UE tripod pinch strength from 6 lbs to 10  lbs as evidenced by the JOHN pinch strength assessment to improve performance within daily routines and life roles.  6 lbs Discontinue     Pt will improve R UE tripod pinch strength from 7 lbs to 11 lbs as evidenced by the JOHN pinch strength assessment to improve performance within daily routines and life roles.  7 lbs Discontinue     Pt will improve delayed recall section of the MoCA from 1 to 4 to improve cognitive performance within daily routines and life roles.  1 Discontinue     Pt will improve MoCA score from 20/30 to 26/30 to improve performance within return to work and iADL occupations.  20 Discontinue

## 2024-02-28 ENCOUNTER — APPOINTMENT (OUTPATIENT)
Age: 69
End: 2024-02-28
Payer: COMMERCIAL

## 2024-03-07 ENCOUNTER — TELEPHONE (OUTPATIENT)
Dept: INTERNAL MEDICINE CLINIC | Facility: CLINIC | Age: 69
End: 2024-03-07

## 2024-03-07 NOTE — TELEPHONE ENCOUNTER
Per patient's chart, she has been seeing Dr. Beckford at [facility] for a PCP. Left a message for patient to call back to advise us on which office she is staying with at PCP

## 2024-03-29 LAB
LEFT EYE DIABETIC RETINOPATHY: NORMAL
RIGHT EYE DIABETIC RETINOPATHY: NORMAL

## 2024-03-31 DIAGNOSIS — G89.29 CHRONIC PAIN OF LEFT KNEE: Primary | ICD-10-CM

## 2024-03-31 DIAGNOSIS — M25.562 CHRONIC PAIN OF LEFT KNEE: Primary | ICD-10-CM

## 2024-04-02 ENCOUNTER — APPOINTMENT (OUTPATIENT)
Dept: RADIOLOGY | Facility: CLINIC | Age: 69
End: 2024-04-02
Payer: COMMERCIAL

## 2024-04-02 ENCOUNTER — OFFICE VISIT (OUTPATIENT)
Dept: OBGYN CLINIC | Facility: CLINIC | Age: 69
End: 2024-04-02
Payer: COMMERCIAL

## 2024-04-02 VITALS
HEART RATE: 80 BPM | DIASTOLIC BLOOD PRESSURE: 74 MMHG | HEIGHT: 60 IN | BODY MASS INDEX: 35.77 KG/M2 | WEIGHT: 182.2 LBS | SYSTOLIC BLOOD PRESSURE: 149 MMHG

## 2024-04-02 DIAGNOSIS — G89.29 CHRONIC PAIN OF LEFT KNEE: ICD-10-CM

## 2024-04-02 DIAGNOSIS — M25.562 CHRONIC PAIN OF LEFT KNEE: ICD-10-CM

## 2024-04-02 DIAGNOSIS — S83.242A TEAR OF MEDIAL MENISCUS OF LEFT KNEE, UNSPECIFIED TEAR TYPE, UNSPECIFIED WHETHER OLD OR CURRENT TEAR, INITIAL ENCOUNTER: Primary | ICD-10-CM

## 2024-04-02 DIAGNOSIS — M17.12 PRIMARY OSTEOARTHRITIS OF LEFT KNEE: ICD-10-CM

## 2024-04-02 PROCEDURE — 73562 X-RAY EXAM OF KNEE 3: CPT

## 2024-04-02 PROCEDURE — 20610 DRAIN/INJ JOINT/BURSA W/O US: CPT | Performed by: ORTHOPAEDIC SURGERY

## 2024-04-02 PROCEDURE — 99203 OFFICE O/P NEW LOW 30 MIN: CPT | Performed by: ORTHOPAEDIC SURGERY

## 2024-04-02 RX ORDER — METHYLPREDNISOLONE ACETATE 40 MG/ML
2 INJECTION, SUSPENSION INTRA-ARTICULAR; INTRALESIONAL; INTRAMUSCULAR; SOFT TISSUE
Status: COMPLETED | OUTPATIENT
Start: 2024-04-02 | End: 2024-04-02

## 2024-04-02 RX ORDER — BUPIVACAINE HYDROCHLORIDE 2.5 MG/ML
2 INJECTION, SOLUTION INFILTRATION; PERINEURAL
Status: COMPLETED | OUTPATIENT
Start: 2024-04-02 | End: 2024-04-02

## 2024-04-02 RX ADMIN — METHYLPREDNISOLONE ACETATE 2 ML: 40 INJECTION, SUSPENSION INTRA-ARTICULAR; INTRALESIONAL; INTRAMUSCULAR; SOFT TISSUE at 08:45

## 2024-04-02 RX ADMIN — BUPIVACAINE HYDROCHLORIDE 2 ML: 2.5 INJECTION, SOLUTION INFILTRATION; PERINEURAL at 08:45

## 2024-04-02 NOTE — PATIENT INSTRUCTIONS
Meniscus Tears  Meniscus tears are among the most common knee injuries. Athletes, particularly those who play contact sports, are at risk for meniscus tears. However, anyone at any age can tear the meniscus. When people talk about torn cartilage in the knee, they are usually referring to a torn meniscus.    Anatomy  Two bones meet to form your knee joint: the femur and the tibia. The kneecap (patella) sits in front of the joint to provide some protection.  Two wedge-shaped pieces of fibrocartilage act as shock absorbers between your femur and tibia. These are the menisci. The menisci help to transmit weight from one bone to another and play an important role in knee stability.    Normal knee anatomy. The menisci are two rubbery disks that help cushion the knee joint.     Description  The meniscus can tear from acute trauma or as the result of degenerative changes that happen over time.  Tears are noted by how they look, as well as where the tear occurs in the meniscus. Common tears include bucket handle, flap, and radial.  Sports-related meniscus injuries often occur along with other knee injuries, such as anterior cruciate ligament (ACL) tears.    Types of meniscus tears:  (Left) Bucket handle tear. (Right) Flap tear.     (Left) Radial tear. (Right) Degenerative tear.     Cause  Acute meniscus tears often happen during sports. These can occur through either a contact or non-contact injury -- for example, a pivoting or cutting injury.  As people age, they are more likely to have degenerative meniscus tears. Aged, worn tissue is more prone to tears. An awkward twist when getting up from a chair may be enough to cause a tear in an aging meniscus.    Symptoms  You might feel a pop when you tear the meniscus. Most people can still walk on their injured knee, and many athletes are able to keep playing with a tear. Over 2 to 3 days, however, the knee will gradually become more stiff and swollen.  The most common symptoms  of a meniscus tear are:  Pain  Stiffness and swelling  Catching or locking of your knee  The sensation of your knee giving way  Inability to move your knee through its full range of motion    Doctor Examination  Physical Examination  After discussing your symptoms and medical history, your doctor will examine your knee. They will check for tenderness along the joint line where the meniscus sits. This often signals a tear.    During the exam, your doctor will look for signs of tenderness along the joint line.     One of the main tests for meniscus tears is the Rae test. Your doctor will bend your knee, then straighten and rotate it. This puts tension on a torn meniscus. If you have a meniscus tear, this movement may cause pain, clicking, or a clunking sensation within the joint.    The Rae test (shown here) will help your doctor determine if you have a meniscus tear.     Imaging Tests  Because other knee injuries can cause similar symptoms, your doctor may order imaging tests to help confirm the diagnosis.  X-rays. X-rays provide images of dense structures, such as bone. Although an X-ray will not show a meniscus tear, your doctor may order one to look for other causes of knee pain, such as osteoarthritis.    Magnetic resonance imaging (MRI) scans.  An MRI scan assesses the soft tissues in your knee joint, including the menisci, cartilage, tendons, and ligaments.    MRI scans show (left) a normal meniscus and (right) a torn meniscus. The tear can be seen as a white line through the dark body of the meniscus.     Related Articles  TREATMENT  Knee Arthroscopy  RECOVERY  Knee Rehabilitation Exercises  DISEASES & CONDITIONS  Patellofemoral Pain Syndrome  STAYING HEALTHY  Tips for a Safe Running Program    Treatment  The treatment your doctor recommends will depend on a number of factors, including your age, symptoms, and activity level. They will also consider the type, size, and location of the injury.    The  "outer one-third of the meniscus has a rich blood supply. A tear in this \"red\" zone may heal on its own, or can often be repaired with surgery. A longitudinal tear is an example of this kind of tear.    In contrast, the inner two-thirds of the meniscus lacks a significant blood supply. Without nutrients from blood, tears in this \"white\" zone with limited blood flow cannot heal. Because the pieces cannot grow back together, symptomatic tears in this zone that do not respond to conservative treatment are usually trimmed surgically.    Nonsurgical Treatment  Many meniscus tears will not need immediate surgery. If your symptoms do not persist and you have no locking or swelling of the knee, your doctor may recommend nonsurgical treatment.    RICE. The RICE protocol is effective for most sports-related injuries. RICE stands for Rest, Ice, Compression, and Elevation.  Rest. Take a break from the activity that caused the injury. Your doctor may recommend that you use crutches to avoid putting weight on your leg.  Ice. Use cold packs for 20 minutes at a time, several times a day. Do not apply ice directly to the skin.  Compression. To prevent additional swelling and blood loss, wear an elastic compression bandage.  Elevation. To reduce swelling, recline when you rest, and put your leg up higher than your heart.  Nonsteroidal anti-inflammatory drugs (NSAIDs). Anti-inflammatory drugs such as aspirin, ibuprofen, and naproxen help reduce pain and swelling.  Steroid injection. Your doctor may inject a corticosteroid medication into your knee joint to help eliminate pain and swelling.  Other nonsurgical treatment. Biologics injections, such as platelet-rich plasma (PRP), are currently being studied and may show promise in the future for the treatment of meniscus tears.    Surgical Treatment  If your symptoms persist with nonsurgical treatment, your doctor may suggest arthroscopic surgery.  Procedure.  Knee arthroscopy is one of " the most commonly performed surgical procedures. In this procedure, the surgeon inserts a miniature camera through a small incision (portal) in the knee. This provides a clear view of the inside of the knee. The surgeon then inserts surgical instruments through two or three other small portals to trim or repair the tear.    Illustration and photo show a camera and instruments inserted through portals in a knee.     Partial meniscectomy.  In this procedure, the damaged meniscus tissue is trimmed away. This procedure typically allows for immediate weight bearing, and full range of motion soon after surgery.  In this short surgical video, a degenerative meniscus tear is  smoothed down with a motorized shaver during a partial meniscectomy.   Meniscus repair.  Some meniscus tears can be repaired by suturing (stitching) the torn pieces together. Whether a tear can be successfully repaired depends upon the type of tear, as well as the overall condition of the injured meniscus. Because the meniscus must heal back together, recovery time for a repair is longer than for a meniscectomy.    Close-up of partial meniscectomy     A torn meniscus repaired with sutures     Once the initial healing is complete, your doctor will prescribe rehabilitation exercises. Regular exercise to restore your knee mobility and strength is necessary. You will start with exercises to improve your range of motion. Strengthening exercises will gradually be added to your rehabilitation plan.  In many cases, rehabilitation can be carried out at home, although your doctor may recommend working with a physical therapist. Rehabilitation time for a meniscus repair is about 3 to 6 months. A meniscectomy requires less time for healing -- approximately 3 to 6 weeks.    Recovery  Meniscus tears are extremely common knee injuries. With proper diagnosis, treatment, and rehabilitation, patients often return to their pre-injury abilities.    Arthritis of the  "Knee    Arthritis is inflammation of one or more of your joints. Pain, swelling, and stiffness are the primary symptoms of arthritis. Any joint in the body may be affected by the disease, but it is particularly common in the knee.    Knee arthritis can make it hard to do many everyday activities, such as walking or climbing stairs. It is a major cause of lost work time and a serious disability for many people.    The most common types of arthritis are osteoarthritis and rheumatoid arthritis, but there are more than 100 different forms. While arthritis is mainly an adult disease, some forms affect children.    Although there is no cure for arthritis, there are many treatment options available to help manage pain and keep people staying active.    Anatomy  The knee is the largest and strongest joint in your body. It is made up of the lower end of the femur (thighbone), the upper end of the tibia (shinbone), and the patella (kneecap). The ends of the three bones that form the knee joint are covered with articular cartilage, a smooth, slippery substance that protects and cushions the bones as you bend and straighten your knee.    Two wedge-shaped pieces of cartilage called meniscus act as \"shock absorbers\" between your thighbone and shinbone. They are tough and rubbery to help cushion the joint and keep it stable.    The knee joint is surrounded by a thin lining called the synovial membrane. This membrane releases a fluid that lubricates the cartilage and reduces friction.      Normal knee anatomy. The knee is made up of bones, cartilage, ligaments, and tendons.     Description  The major types of arthritis that affect the knee are osteoarthritis, rheumatoid arthritis, and posttraumatic arthritis.    Osteoarthritis  Osteoarthritis is the most common form of arthritis in the knee. It is a degenerative, wear-and-tear type of arthritis that occurs most often in people 50 years of age and older, although it may occur in " younger people, too.    In osteoarthritis, the cartilage in the knee joint gradually wears away. As the cartilage wears away, it becomes frayed and rough, and the protective space between the bones decreases. This can result in bone rubbing on bone and produce painful bone spurs.    Osteoarthritis usually develops slowly, and the pain it causes worsens over time.    Osteoarthritis often results in bone rubbing on bone. Bone spurs are a common feature of this form of arthritis.     Watch: Osteoarthritis of the Knee Animation    Rheumatoid Arthritis  Rheumatoid arthritis is a chronic disease that attacks multiple joints throughout the body, including the knee joint. It is symmetrical, meaning that it usually affects the same joint on both sides of the body.  In rheumatoid arthritis, the synovial membrane that covers the knee joint begins to swell. This results in knee pain and stiffness.    Rheumatoid arthritis is an autoimmune disease. This means that the immune system attacks its own tissues. The immune system damages normal tissue (such as cartilage and ligaments) and softens the bone.    Posttraumatic Arthritis  Posttraumatic arthritis is form of arthritis that develops after an injury to the knee. For example, a broken bone may damage the joint surface and lead to arthritis years after the injury. Meniscal tears and ligament injuries can cause instability and additional wear on the knee joint which, over time, can result in arthritis.    Symptoms  A knee joint affected by arthritis may be painful and inflamed. Generally, the pain develops gradually over time, although sudden onset is also possible. There are other symptoms, as well:  The joint may become stiff and swollen, making it difficult to bend and straighten the knee.  Pain and swelling may be worse in the morning, or after sitting or resting.  Vigorous activity may cause pain to flare up.  Loose fragments of cartilage and other tissue can interfere with  the smooth motion of joints. The knee may lock or stick during movement. It may creak, click, snap, or make a grinding noise (crepitus).  Pain may cause a feeling of weakness or buckling in the knee.  Many people with arthritis note increased joint pain with changes in the weather.    Doctor Examination  During your appointment, your doctor will talk with you about your symptoms and medical history, conduct a physical examination, and possibly order diagnostic tests, such as X-rays or blood tests.    Physical Examination  During the physical examination, your doctor will look for:  Joint swelling, warmth, or redness  Tenderness around the knee  Range of passive (assisted) and active (self-directed) motion  Instability of the joint  Crepitus (a grating sensation inside the joint) with movement  Pain when weight is placed on the knee  Problems with your gait (the way you walk)  Any signs of injury to the muscles, tendons, and ligaments surrounding the knee  Involvement of other joints (an indication of rheumatoid arthritis)    Imaging Tests  X-rays. These imaging tests provide detailed pictures of dense structures, such as bone. They can help distinguish among various forms of arthritis. X-rays of an arthritic knee may show a narrowing of the joint space, changes in the bone, and the formation of bone spurs (osteophytes).  Other tests. Occasionally, a magnetic resonance imaging (MRI) scan or a computerized tomography (CT) scan may be needed to determine the condition of the bone and soft tissues of your knee.      (Left) In this X-ray of a normal knee, the space between the bones indicates healthy cartilage (arrows). (Right) This X-ray of an arthritic knee shows severe loss of joint space.     Laboratory Tests  Your doctor may also recommend blood tests to determine which type of arthritis you have. With some types of arthritis, including rheumatoid arthritis, blood tests will help with a proper diagnosis.    Related  "Articles  TREATMENT  Total Knee Replacement  TREATMENT  Platelet-Rich Plasma (PRP)  RECOVERY  Activities After Total Knee Replacement  TREATMENT  Viscosupplementation Treatment for Knee Arthritis    Treatment  There is no cure for arthritis but there are a number of treatments that may help relieve the pain and disability it can cause.    Nonsurgical Treatment  As with other arthritic conditions, initial treatment of arthritis of the knee is nonsurgical. Your doctor may recommend a range of treatment options.    Lifestyle modifications. Some changes in your daily life can protect your knee joint and slow the progress of arthritis.  Minimize activities that aggravate the condition, such as climbing stairs.  Exercise is recommended for osteoarthritis to improve pain and function. Switching from high-impact activities (like jogging or tennis) to lower impact activities (like swimming or cycling) will enable you to be active while putting less stress on your knee. Balance, agility, and coordination exercises, combined with traditional exercise, may help to improve function and walking speed.  Losing weight can reduce stress on the knee joint, resulting in less pain and increased function.    Physical therapy. Specific exercises can help increase range of motion and flexibility, as well as help strengthen the muscles in your leg. Your doctor or a physical therapist can help develop an individualized exercise program that meets your needs and lifestyle.    Assistive devices. Using devices such as a cane, or wearing a brace or knee sleeve can be helpful. A brace assists with stability and function, and it may be especially helpful if the arthritis is centered on one side of the knee. There are two types of braces that are often used for knee arthritis: An \"\" brace shifts weight away from the affected portion of the knee, while a \"support\" brace helps support the entire knee load. Placing wedges in your shoe is not " "recommended for relieving knee discomfort.    Other remedies. Applying heat or ice, or wearing elastic bandages to provide support to the knee may provide some relief from pain.    Medications. Several types of drugs are useful in treating arthritis of the knee. Because people respond differently to medications, your doctor will work closely with you to determine the medications and dosages that are safe and effective for you.  Over-the-counter, non-narcotic pain relievers and anti-inflammatory medications are usually the first choice of therapy for arthritis of the knee. Like all medications, over-the-counter pain relievers can cause side effects and interact with other medications you are taking. Be sure to discuss potential side effects with your doctor.  Another type of pain reliever is a nonsteroidal anti-inflammatory drug, or NSAID (pronounced \"en-said\"). NSAID's, such as ibuprofen and naproxen, are available both over-the-counter and by prescription and in oral and topical (gel) forms. Oral NSAID's are recommended to improve pain and function in people with knee osteoarthritis. However, NSAID's should be used with caution, or avoided, in people with certain health conditions, such as coronary artery disease, congestive heart failure, and chronic kidney disease. Talk to your doctor about whether NSAID's are right for you. Acetaminophen is a simple, over-the-counter pain reliever that can be effective in reducing arthritis pain, especially for people who cannot tolerate traditional NSAID's.   A LAI-2 inhibitor is a special type of NSAID that may cause fewer gastrointestinal side effects. Common brand names of LAI-2 inhibitors include Celebrex (celecoxib) and Mobic (Meloxicam, which is a partial LAI-2 inhibitor). A LAI-2 inhibitor reduces pain and inflammation so that you can function better. If you are taking a LAI-2 inhibitor, you should not use a traditional NSAID (prescription or over-the-counter). Be sure " to tell your doctor if you have had a heart attack, stroke, angina, blood clot, hypertension, or if you are sensitive to aspirin, sulfa drugs, or other NSAID's.  Topical NSAID's are also available for the treatment of knee pain. There is a strong recommendation to try this type of treatment for pain control, especially if you are unable to tolerate oral NSAID's.  The use of oral narcotic medications, including opioids, should be avoided. This class of medications is not effective for the treatment of pain due to knee arthritis.  Corticosteroids (also known as cortisone) are powerful anti-inflammatory agents that can be injected into the joint. These injections can provide pain relief and reduce inflammation; however, the effects do not last indefinitely. Your doctor may recommend limiting the number of injections to three or four per year, per joint, due to possible side effects. In some cases, pain and swelling may flare immediately after the injection, and the potential exists for long-term joint damage or infection. With frequent repeated injections, or injections over an extended period of time, joint damage can actually increase rather than decrease.  Disease-modifying anti-rheumatic drugs (DMARDs) are used to slow the progression of rheumatoid arthritis. Drugs like methotrexate, sulfasalazine, and hydroxychloroquine are commonly prescribed. In addition, biologic DMARDs like etanercept (Enbrel) and adalimumab (Humira) may reduce the body's overactive immune response. Because there are many different drugs today for rheumatoid arthritis, a rheumatology specialist is often required to effectively manage medications.  Glucosamine and chondroitin sulfate, substances found naturally in joint cartilage, can be taken as dietary supplements. Although patient reports indicate that these supplements may relieve pain, there is no evidence to support the use of glucosamine and chondroitin sulfate to decrease or reverse the  progression of arthritis. In addition, the [place] Food and Drug Administration does not test dietary supplements before they are sold to consumers. These compounds may cause side effects, as well as negative interactions with other medications. Always consult your doctor before taking dietary supplements.    Alternative therapies. Many alternative forms of therapy are unproven, but may be helpful to try, provided you find a qualified practitioner and keep your doctor informed of your decision. Alternative therapies to treat pain include the use of acupuncture, magnetic pulse therapy, platelet-rich plasma, and stem cell injections.    Acupuncture uses fine needles to stimulate specific body areas to relieve pain or temporarily numb an area. Although it is used in many parts of the world and evidence suggests that it can help ease the pain of arthritis, there are few scientific studies of its effectiveness. Be sure your acupuncturist is certified, and do not hesitate to ask about his or her sterilization practices.    Magnetic pulse therapy is painless and works by applying a pulsed signal to the knee, which is placed in an electromagnetic field. Like many alternative therapies, magnetic pulse therapy has yet to be proven.  Biologic treatments such as platelet-rich plasma (PRP) and stem cell injections involve taking cells from your own body and re-injecting them into a painful joint.    PRP uses a component of your own blood, platelets, that have been  from your blood, concentrated, and injected into your knee. The platelets contain “growth factors” thought to be helpful in reducing the symptoms of inflammation.  Stem cells are precursor cells that can also be taken from your own body and injected into your knee. Since they are basic cells, they may have potential to grow into new tissue and thus heal damaged joint surfaces.    While both treatments show promise, clinical studies have yet to confirm their  value in treating osteoarthritis.    Surgical Treatment  Your doctor may recommend surgery if your pain from arthritis causes disability and is not relieved with nonsurgical treatment. As with all surgeries, there are some risks and possible complications with different knee procedures. Your doctor will discuss the possible complications with you before your operation.    Arthroscopy. During arthroscopy, doctors use small incisions and thin instruments to diagnose and treat joint problems.    Arthroscopic surgery is not often used to treat arthritis of the knee. In cases where osteoarthritis is accompanied by a degenerative meniscal tear, arthroscopic surgery may be recommended to treat the torn meniscus.    Cartilage grafting (cartilage restoration). Normal, healthy cartilage tissue may be taken from another part of the knee or from a tissue bank to fill a hole in the articular cartilage. This procedure is typically considered only for younger patients who have small areas of cartilage damage.    Synovectomy. The joint lining damaged by rheumatoid arthritis is removed to reduce pain and swelling.    Osteotomy. In a knee osteotomy, either the tibia (shinbone) or femur (thighbone) is cut and then reshaped to relieve pressure on the knee joint. Knee osteotomy is used when you have early-stage osteoarthritis that has damaged just one side of the knee joint. By shifting your weight off the damaged side of the joint, an osteotomy can relieve pain and significantly improve function in your arthritic knee.    Total knee replacement or partial (unicompartmental) knee replacement (arthroplasty). Your doctor will remove the damaged cartilage and bone, and then position new metal or plastic joint surfaces to restore the function of your knee.      (Left) A partial knee replacement is an option when damage is limited to just one part of the knee. (Right) A total knee replacement prosthesis.     Recovery  After any type of  surgery for arthritis of the knee, there is a period of recovery. Recovery time and rehabilitation depends on the type of surgery performed.    Your doctor may recommend physical therapy to help you regain strength in your knee and to restore range of motion. Depending upon your procedure, you may need to wear a knee brace, or use crutches or a cane for a time.    In most cases, surgery relieves pain and makes it possible to perform daily activities more easily.    To assist doctors in the nonsurgical management of knee osteoarthritis, the [employer] Surgeons has conducted research to provide some useful guidelines. These are recommendations only and may not apply to every case. For more information: Plain Language Summary - Clinical Practice Guideline - Management of Knee Osteoarthritis      Check blood sugar levels for the next 72 hours. Call primary care doctor if blood sugar is above normal for plan to bring blood sugar level down.
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
## 2024-04-29 DIAGNOSIS — G62.9 NEUROPATHY: ICD-10-CM

## 2024-04-29 DIAGNOSIS — I63.50 RIGHT PONTINE STROKE (HCC): ICD-10-CM

## 2024-04-30 RX ORDER — GABAPENTIN 100 MG/1
100 CAPSULE ORAL
Qty: 30 CAPSULE | Refills: 2 | Status: SHIPPED | OUTPATIENT
Start: 2024-04-30

## 2024-05-16 ENCOUNTER — TELEPHONE (OUTPATIENT)
Dept: NEUROLOGY | Facility: CLINIC | Age: 69
End: 2024-05-16

## 2024-05-16 NOTE — TELEPHONE ENCOUNTER
Reached out to pt  AND LM to switch location of appointment as provider will be in another office that day and time.

## 2024-05-23 ENCOUNTER — TELEPHONE (OUTPATIENT)
Dept: NEUROLOGY | Facility: CLINIC | Age: 69
End: 2024-05-23

## 2024-05-23 NOTE — TELEPHONE ENCOUNTER
Reached out and LM to pt to switch location of appointment as provider will be in another office that day and time.

## 2024-05-24 ENCOUNTER — TELEPHONE (OUTPATIENT)
Dept: INTERNAL MEDICINE CLINIC | Facility: CLINIC | Age: 69
End: 2024-05-24

## 2024-05-24 NOTE — TELEPHONE ENCOUNTER
Patient is overdue for annual wellness visit. Attempted to call patient, unable to reach or leave a message.

## 2024-06-24 ENCOUNTER — OFFICE VISIT (OUTPATIENT)
Dept: INTERNAL MEDICINE CLINIC | Facility: CLINIC | Age: 69
End: 2024-06-24
Payer: COMMERCIAL

## 2024-06-24 VITALS
HEIGHT: 60 IN | WEIGHT: 181 LBS | HEART RATE: 84 BPM | BODY MASS INDEX: 35.53 KG/M2 | OXYGEN SATURATION: 96 % | SYSTOLIC BLOOD PRESSURE: 124 MMHG | DIASTOLIC BLOOD PRESSURE: 70 MMHG

## 2024-06-24 DIAGNOSIS — E66.01 CLASS 2 SEVERE OBESITY DUE TO EXCESS CALORIES WITH SERIOUS COMORBIDITY AND BODY MASS INDEX (BMI) OF 35.0 TO 35.9 IN ADULT (HCC): ICD-10-CM

## 2024-06-24 DIAGNOSIS — R53.83 OTHER FATIGUE: ICD-10-CM

## 2024-06-24 DIAGNOSIS — Z78.0 POSTMENOPAUSAL: ICD-10-CM

## 2024-06-24 DIAGNOSIS — F32.1 CURRENT MODERATE EPISODE OF MAJOR DEPRESSIVE DISORDER WITHOUT PRIOR EPISODE (HCC): ICD-10-CM

## 2024-06-24 DIAGNOSIS — Z00.00 MEDICARE ANNUAL WELLNESS VISIT, SUBSEQUENT: ICD-10-CM

## 2024-06-24 DIAGNOSIS — E78.2 MIXED HYPERLIPIDEMIA: ICD-10-CM

## 2024-06-24 DIAGNOSIS — E11.9 TYPE 2 DIABETES MELLITUS WITHOUT COMPLICATION, WITHOUT LONG-TERM CURRENT USE OF INSULIN (HCC): ICD-10-CM

## 2024-06-24 DIAGNOSIS — I10 BENIGN ESSENTIAL HYPERTENSION: ICD-10-CM

## 2024-06-24 DIAGNOSIS — J30.2 SEASONAL ALLERGIES: Primary | ICD-10-CM

## 2024-06-24 PROBLEM — E66.9 CLASS 2 OBESITY WITH BODY MASS INDEX (BMI) OF 35.0 TO 35.9 IN ADULT: Status: ACTIVE | Noted: 2024-06-24

## 2024-06-24 PROBLEM — E66.812 CLASS 2 OBESITY WITH BODY MASS INDEX (BMI) OF 35.0 TO 35.9 IN ADULT: Status: ACTIVE | Noted: 2024-06-24

## 2024-06-24 PROCEDURE — G0439 PPPS, SUBSEQ VISIT: HCPCS | Performed by: PHYSICIAN ASSISTANT

## 2024-06-24 PROCEDURE — 99214 OFFICE O/P EST MOD 30 MIN: CPT | Performed by: PHYSICIAN ASSISTANT

## 2024-06-24 RX ORDER — LORATADINE 10 MG/1
10 TABLET ORAL DAILY
Qty: 30 TABLET | Refills: 2 | Status: SHIPPED | OUTPATIENT
Start: 2024-06-24

## 2024-06-24 RX ORDER — CYCLOSPORINE 0.5 MG/ML
1 EMULSION OPHTHALMIC DAILY PRN
COMMUNITY
Start: 2024-04-01

## 2024-06-24 RX ORDER — CYCLOBENZAPRINE HCL 10 MG
10 TABLET ORAL
COMMUNITY
Start: 2024-03-26

## 2024-06-24 NOTE — PATIENT INSTRUCTIONS
Medicare Preventive Visit Patient Instructions  Thank you for completing your Welcome to Medicare Visit or Medicare Annual Wellness Visit today. Your next wellness visit will be due in one year (6/25/2025).  The screening/preventive services that you may require over the next 5-10 years are detailed below. Some tests may not apply to you based off risk factors and/or age. Screening tests ordered at today's visit but not completed yet may show as past due. Also, please note that scanned in results may not display below.  Preventive Screenings:  Service Recommendations Previous Testing/Comments   Colorectal Cancer Screening  * Colonoscopy    * Fecal Occult Blood Test (FOBT)/Fecal Immunochemical Test (FIT)  * Fecal DNA/Cologuard Test  * Flexible Sigmoidoscopy Age: 45-75 years old   Colonoscopy: every 10 years (may be performed more frequently if at higher risk)  OR  FOBT/FIT: every 1 year  OR  Cologuard: every 3 years  OR  Sigmoidoscopy: every 5 years  Screening may be recommended earlier than age 45 if at higher risk for colorectal cancer. Also, an individualized decision between you and your healthcare provider will decide whether screening between the ages of 76-85 would be appropriate. Colonoscopy: Not on file  FOBT/FIT: Not on file  Cologuard: Not on file  Sigmoidoscopy: Not on file          Breast Cancer Screening Age: 40+ years old  Frequency: every 1-2 years  Not required if history of left and right mastectomy Mammogram: Not on file        Cervical Cancer Screening Between the ages of 21-29, pap smear recommended once every 3 years.   Between the ages of 30-65, can perform pap smear with HPV co-testing every 5 years.   Recommendations may differ for women with a history of total hysterectomy, cervical cancer, or abnormal pap smears in past. Pap Smear: Not on file    Screening Not Indicated   Hepatitis C Screening Once for adults born between 1945 and 1965  More frequently in patients at high risk for Hepatitis  C Hep C Antibody: Not on file        Diabetes Screening 1-2 times per year if you're at risk for diabetes or have pre-diabetes Fasting glucose: 164 mg/dL (12/9/2023)  A1C: 8.2 % (12/9/2023)  Screening Not Indicated  History Diabetes   Cholesterol Screening Once every 5 years if you don't have a lipid disorder. May order more often based on risk factors. Lipid panel: 12/09/2023    Screening Not Indicated  History Lipid Disorder     Other Preventive Screenings Covered by Medicare:  Abdominal Aortic Aneurysm (AAA) Screening: covered once if your at risk. You're considered to be at risk if you have a family history of AAA.  Lung Cancer Screening: covers low dose CT scan once per year if you meet all of the following conditions: (1) Age 55-77; (2) No signs or symptoms of lung cancer; (3) Current smoker or have quit smoking within the last 15 years; (4) You have a tobacco smoking history of at least 20 pack years (packs per day multiplied by number of years you smoked); (5) You get a written order from a healthcare provider.  Glaucoma Screening: covered annually if you're considered high risk: (1) You have diabetes OR (2) Family history of glaucoma OR (3)  aged 50 and older OR (4)  American aged 65 and older  Osteoporosis Screening: covered every 2 years if you meet one of the following conditions: (1) You're estrogen deficient and at risk for osteoporosis based off medical history and other findings; (2) Have a vertebral abnormality; (3) On glucocorticoid therapy for more than 3 months; (4) Have primary hyperparathyroidism; (5) On osteoporosis medications and need to assess response to drug therapy.   Last bone density test (DXA Scan): Not on file.  HIV Screening: covered annually if you're between the age of 15-65. Also covered annually if you are younger than 15 and older than 65 with risk factors for HIV infection. For pregnant patients, it is covered up to 3 times per  pregnancy.    Immunizations:  Immunization Recommendations   Influenza Vaccine Annual influenza vaccination during flu season is recommended for all persons aged >= 6 months who do not have contraindications   Pneumococcal Vaccine   * Pneumococcal conjugate vaccine = PCV13 (Prevnar 13), PCV15 (Vaxneuvance), PCV20 (Prevnar 20)  * Pneumococcal polysaccharide vaccine = PPSV23 (Pneumovax) Adults 19-65 yo with certain risk factors or if 65+ yo  If never received any pneumonia vaccine: recommend Prevnar 20 (PCV20)  Give PCV20 if previously received 1 dose of PCV13 or PPSV23   Hepatitis B Vaccine 3 dose series if at intermediate or high risk (ex: diabetes, end stage renal disease, liver disease)   Respiratory syncytial virus (RSV) Vaccine - COVERED BY MEDICARE PART D  * RSVPreF3 (Arexvy) CDC recommends that adults 60 years of age and older may receive a single dose of RSV vaccine using shared clinical decision-making (SCDM)   Tetanus (Td) Vaccine - COST NOT COVERED BY MEDICARE PART B Following completion of primary series, a booster dose should be given every 10 years to maintain immunity against tetanus. Td may also be given as tetanus wound prophylaxis.   Tdap Vaccine - COST NOT COVERED BY MEDICARE PART B Recommended at least once for all adults. For pregnant patients, recommended with each pregnancy.   Shingles Vaccine (Shingrix) - COST NOT COVERED BY MEDICARE PART B  2 shot series recommended in those 19 years and older who have or will have weakened immune systems or those 50 years and older     Health Maintenance Due:      Topic Date Due   • Hepatitis C Screening  Never done   • Colorectal Cancer Screening  08/22/2022   • Breast Cancer Screening: Mammogram  01/04/2025 (Originally [birth year]/1/1995)     Immunizations Due:      Topic Date Due   • Pneumococcal Vaccine: 65+ Years (2 of 2 - PCV) 12/23/2014   • COVID-19 Vaccine (1 - 2023-24 season) Never done   • Influenza Vaccine (Season Ended) 09/01/2024     Advance Directives    What are advance directives?  Advance directives are legal documents that state your wishes and plans for medical care. These plans are made ahead of time in case you lose your ability to make decisions for yourself. Advance directives can apply to any medical decision, such as the treatments you want, and if you want to donate organs.   What are the types of advance directives?  There are many types of advance directives, and each state has rules about how to use them. You may choose a combination of any of the following:  Living will:  This is a written record of the treatment you want. You can also choose which treatments you do not want, which to limit, and which to stop at a certain time. This includes surgery, medicine, IV fluid, and tube feedings.   Durable power of  for healthcare (DPAHC):  This is a written record that states who you want to make healthcare choices for you when you are unable to make them for yourself. This person, called a proxy, is usually a family member or a friend. You may choose more than 1 proxy.  Do not resuscitate (DNR) order:  A DNR order is used in case your heart stops beating or you stop breathing. It is a request not to have certain forms of treatment, such as CPR. A DNR order may be included in other types of advance directives.  Medical directive:  This covers the care that you want if you are in a coma, near death, or unable to make decisions for yourself. You can list the treatments you want for each condition. Treatment may include pain medicine, surgery, blood transfusions, dialysis, IV or tube feedings, and a ventilator (breathing machine).  Values history:  This document has questions about your views, beliefs, and how you feel and think about life. This information can help others choose the care that you would choose.  Why are advance directives important?  An advance directive helps you control your care. Although spoken wishes may be used, it is better to  have your wishes written down. Spoken wishes can be misunderstood, or not followed. Treatments may be given even if you do not want them. An advance directive may make it easier for your family to make difficult choices about your care.   Urinary Incontinence   Urinary incontinence (UI)  is when you lose control of your bladder. UI develops because your bladder cannot store or empty urine properly. The 3 most common types of UI are stress incontinence, urge incontinence, or both.  Medicines:   May be given to help strengthen your bladder control. Report any side effects of medication to your healthcare provider.  Do pelvic muscle exercises often:  Your pelvic muscles help you stop urinating. Squeeze these muscles tight for 5 seconds, then relax for 5 seconds. Gradually work up to squeezing for 10 seconds. Do 3 sets of 15 repetitions a day, or as directed. This will help strengthen your pelvic muscles and improve bladder control.  Train your bladder:  Go to the bathroom at set times, such as every 2 hours, even if you do not feel the urge to go. You can also try to hold your urine when you feel the urge to go. For example, hold your urine for 5 minutes when you feel the urge to go. As that becomes easier, hold your urine for 10 minutes.   Self-care:   Keep a UI record.  Write down how often you leak urine and how much you leak. Make a note of what you were doing when you leaked urine.  Drink liquids as directed. You may need to limit the amount of liquid you drink to help control your urine leakage. Do not drink any liquid right before you go to bed. Limit or do not have drinks that contain caffeine or alcohol.   Prevent constipation.  Eat a variety of high-fiber foods. Good examples are high-fiber cereals, beans, vegetables, and whole-grain breads. Walking is the best way to trigger your intestines to have a bowel movement.  Exercise regularly and maintain a healthy weight.  Weight loss and exercise will decrease  pressure on your bladder and help you control your leakage.   Use a catheter as directed  to help empty your bladder. A catheter is a tiny, plastic tube that is put into your bladder to drain your urine.   Go to behavior therapy as directed.  Behavior therapy may be used to help you learn to control your urge to urinate.    Weight Management   Why it is important to manage your weight:  Being overweight increases your risk of health conditions such as heart disease, high blood pressure, type 2 diabetes, and certain types of cancer. It can also increase your risk for osteoarthritis, sleep apnea, and other respiratory problems. Aim for a slow, steady weight loss. Even a small amount of weight loss can lower your risk of health problems.  How to lose weight safely:  A safe and healthy way to lose weight is to eat fewer calories and get regular exercise. You can lose up about 1 pound a week by decreasing the number of calories you eat by 500 calories each day.   Healthy meal plan for weight management:  A healthy meal plan includes a variety of foods, contains fewer calories, and helps you stay healthy. A healthy meal plan includes the following:  Eat whole-grain foods more often.  A healthy meal plan should contain fiber. Fiber is the part of grains, fruits, and vegetables that is not broken down by your body. Whole-grain foods are healthy and provide extra fiber in your diet. Some examples of whole-grain foods are whole-wheat breads and pastas, oatmeal, brown rice, and bulgur.  Eat a variety of vegetables every day.  Include dark, leafy greens such as spinach, kale, rochelle greens, and mustard greens. Eat yellow and orange vegetables such as carrots, sweet potatoes, and winter squash.   Eat a variety of fruits every day.  Choose fresh or canned fruit (canned in its own juice or light syrup) instead of juice. Fruit juice has very little or no fiber.  Eat low-fat dairy foods.  Drink fat-free (skim) milk or 1% milk. Eat  fat-free yogurt and low-fat cottage cheese. Try low-fat cheeses such as mozzarella and other reduced-fat cheeses.  Choose meat and other protein foods that are low in fat.  Choose beans or other legumes such as split peas or lentils. Choose fish, skinless poultry (chicken or turkey), or lean cuts of red meat (beef or pork). Before you cook meat or poultry, cut off any visible fat.   Use less fat and oil.  Try baking foods instead of frying them. Add less fat, such as margarine, sour cream, regular salad dressing and mayonnaise to foods. Eat fewer high-fat foods. Some examples of high-fat foods include french fries, doughnuts, ice cream, and cakes.  Eat fewer sweets.  Limit foods and drinks that are high in sugar. This includes candy, cookies, regular soda, and sweetened drinks.  Exercise:  Exercise at least 30 minutes per day on most days of the week. Some examples of exercise include walking, biking, dancing, and swimming. You can also fit in more physical activity by taking the stairs instead of the elevator or parking farther away from stores. Ask your healthcare provider about the best exercise plan for you.      © Copyright [employer] 2018 Information is for End User's use only and may not be sold, redistributed or otherwise used for commercial purposes. All illustrations and images included in CareNotes® are the copyrighted property of [employer] or [employer]

## 2024-06-24 NOTE — ASSESSMENT & PLAN NOTE
Lab Results   Component Value Date    HGBA1C 8.2 (H) 12/09/2023   Uncontrolled, last A1c at 8.2 on 12/9/2023  Currently taking Ozempic 1 mg weekly and Synjardy  daily  Recheck A1c

## 2024-06-24 NOTE — PROGRESS NOTES
Ambulatory Visit  Name: Theresa Valerio      : 1955      MRN: [id number]  Encounter Provider: Mary Kay Tinoco PA-C  Encounter Date: 2024   Encounter department: [facility] INTERNAL MEDICINE [place]    Assessment & Plan   1. Seasonal allergies  -     loratadine (CLARITIN) 10 mg tablet; Take 1 tablet (10 mg total) by mouth daily  2. Other fatigue  -     CBC and differential; Future  3. Postmenopausal  -     DXA bone density spine hip and pelvis; Future  4. Class 2 severe obesity due to excess calories with serious comorbidity and body mass index (BMI) of 35.0 to 35.9 in adult ([facility])  Assessment & Plan:  Encouraged lifestyle modifications  5. Current moderate episode of major depressive disorder without prior episode ([facility])  Assessment & Plan:  Denies depression, states depression was secondary to divorce  6. Benign essential hypertension  Assessment & Plan:  Well-controlled  Continue valsartan-HCTZ daily  7. Mixed hyperlipidemia  Assessment & Plan:  Continue atorvastatin 40 mg daily  Recheck lipid panel  8. Type 2 diabetes mellitus without complication, without long-term current use of insulin ([facility])  Assessment & Plan:    Lab Results   Component Value Date    HGBA1C 8.2 (H) 2023   Uncontrolled, last A1c at 8.2 on 2023  Currently taking Ozempic 1 mg weekly and Synjardy  daily  Recheck A1c  9. Medicare annual wellness visit, subsequent       Preventive health issues were discussed with patient, and age appropriate screening tests were ordered as noted in patient's After Visit Summary. Personalized health advice and appropriate referrals for health education or preventive services given if needed, as noted in patient's After Visit Summary.    History of Present Illness     HPI   Patient Care Team:  Mary Kay Tinoco PA-C as PCP - General (Physician Assistant)  Mason Beckford MD as PCP - PCP-[facility] ([facility])    Review of Systems  Medical History Reviewed by provider this encounter:        Annual Wellness Visit Questionnaire   Theresa is here for her Subsequent Wellness visit.     Health Risk Assessment:   Patient rates overall health as good. Patient feels that their physical health rating is slightly better. Patient is satisfied with their life. Eyesight was rated as slightly worse. Hearing was rated as same. Patient feels that their emotional and mental health rating is slightly better. Patients states they are sometimes angry. Patient states they are sometimes unusually tired/fatigued. Pain experienced in the last 7 days has been some. Patient's pain rating has been 6/10. Patient states that she has experienced no weight loss or gain in last 6 months.     Depression Screening:   PHQ-9 Score: 3      Fall Risk Screening:   In the past year, patient has experienced: no history of falling in past year      Urinary Incontinence Screening:   Patient has leaked urine accidently in the last six months.     Home Safety:  Patient has trouble with stairs inside or outside of their home. Patient has working smoke alarms and has working carbon monoxide detector. Home safety hazards include: none.     Nutrition:   Current diet is Limited junk food.     Medications:   Patient is not currently taking any over-the-counter supplements. Patient is able to manage medications.     Activities of Daily Living (ADLs)/Instrumental Activities of Daily Living (IADLs):   Walk and transfer into and out of bed and chair?: Yes  Dress and groom yourself?: Yes    Bathe or shower yourself?: Yes    Feed yourself? Yes  Do your laundry/housekeeping?: Yes  Manage your money, pay your bills and track your expenses?: Yes  Make your own meals?: Yes    Do your own shopping?: Yes    Previous Hospitalizations:   Any hospitalizations or ED visits within the last 12 months?: No      PREVENTIVE SCREENINGS      Cardiovascular Screening:    General: Screening Not Indicated, History Lipid Disorder and Screening Current      Diabetes  Screening:     General: Screening Not Indicated, History Diabetes and Screening Current      Colorectal Cancer Screening:       Due for: Cologuard      Breast Cancer Screening:     General: Patient Declines      Cervical Cancer Screening:    General: Screening Not Indicated      Osteoporosis Screening:      Due for: Bone Density CT Axial      Abdominal Aortic Aneurysm (AAA) Screening:        General: Screening Current      Lung Cancer Screening:     General: Screening Not Indicated      Hepatitis C Screening:      Hep C Screening Accepted: Yes      Screening, Brief Intervention, and Referral to Treatment (SBIRT)    Screening  Typical number of drinks in a day: 0  Typical number of drinks in a week: 0  Interpretation: Low risk drinking behavior.    Single Item Drug Screening:  How often have you used an illegal drug (including marijuana) or a prescription medication for non-medical reasons in the past year? never    Single Item Drug Screen Score: 0  Interpretation: Negative screen for possible drug use disorder    Social Determinants of Health     Food Insecurity: No Food Insecurity (6/24/2024)    Hunger Vital Sign     Worried About Running Out of Food in the Last Year: Never true     Ran Out of Food in the Last Year: Never true   Transportation Needs: No Transportation Needs (6/24/2024)    PRAPARE - Transportation     Lack of Transportation (Medical): No     Lack of Transportation (Non-Medical): No   Housing Stability: Low Risk  (6/24/2024)    Housing Stability Vital Sign     Unable to Pay for Housing in the Last Year: No     Number of Times Moved in the Last Year: 0     Homeless in the Last Year: No   Utilities: Not At Risk (6/24/2024)    [facility] Utilities     Threatened with loss of utilities: No     No results found.    Objective     /70 (BP Location: Right arm, Patient Position: Sitting, Cuff Size: Large)   Pulse 84   Ht 5' (1.524 m)   Wt 82.1 kg (181 lb)   SpO2 96%   BMI 35.35 kg/m²     Physical  Exam  Administrative Statements

## 2024-06-24 NOTE — PROGRESS NOTES
Assessment/Plan:      Quality Measures:   Depression Screening and Follow-up Plan: Patient was screened for depression during today's encounter. They screened negative with a PHQ-9 score of 3.    Urinary Incontinence Plan of Care: counseling topics discussed: practice Kegel (pelvic floor strengthening) exercises.          Return in about 4 months (around 10/24/2024).    Benign essential hypertension  Well-controlled  Continue valsartan-HCTZ daily    Mixed hyperlipidemia  Continue atorvastatin 40 mg daily  Recheck lipid panel    Type 2 diabetes mellitus without complication, without long-term current use of insulin ([facility])    Lab Results   Component Value Date    HGBA1C 8.2 (H) 12/09/2023   Uncontrolled, last A1c at 8.2 on 12/9/2023  Currently taking Ozempic 1 mg weekly and Synjardy  daily  Recheck A1c    Current moderate episode of major depressive disorder ([facility])  Denies depression, states depression was secondary to divorce    Class 2 obesity with body mass index (BMI) of 35.0 to 35.9 in adult  Encouraged lifestyle modifications       Diagnoses and all orders for this visit:    Seasonal allergies  -     loratadine (CLARITIN) 10 mg tablet; Take 1 tablet (10 mg total) by mouth daily    Other fatigue  -     CBC and differential; Future    Postmenopausal  -     DXA bone density spine hip and pelvis; Future    Class 2 severe obesity due to excess calories with serious comorbidity and body mass index (BMI) of 35.0 to 35.9 in adult (HCC)    Current moderate episode of major depressive disorder without prior episode ([facility])    Benign essential hypertension    Mixed hyperlipidemia    Type 2 diabetes mellitus without complication, without long-term current use of insulin ([facility])    Other orders  -     cyclobenzaprine (FLEXERIL) 10 mg tablet; Take 10 mg by mouth daily at bedtime as needed for muscle spasms  -     Restasis 0.05 % ophthalmic emulsion; Administer 1 drop to both eyes daily as needed          Subjective:       Patient ID: Theresa Valerio is a 68 y.o. female.    Patient is a pleasant 68-year-old female who presents in the office today for her Medicare annual wellness visit and routine follow-up.  She reports some residual left-sided numbness and tingling of upper and lower extremity since having her stroke.  She continues to follow with neurology.        ALLERGIES:  No Known Allergies    CURRENT MEDICATIONS:    Current Outpatient Medications:     albuterol (PROVENTIL HFA,VENTOLIN HFA) 90 mcg/act inhaler, Inhale 2 puffs 4 (four) times a day As needed, Disp: , Rfl:     aspirin 81 mg chewable tablet, Chew 1 tablet (81 mg total) daily Please buy over the counter Do not start before December 10, 2023., Disp: , Rfl: 0    atorvastatin (LIPITOR) 40 mg tablet, Take 1 tablet (40 mg total) by mouth daily, Disp: 40 tablet, Rfl: 1    Blood Glucose Monitoring Suppl (OneTouch Verio) w/Device KIT, Test blood sugar once daily., Disp: 1 kit, Rfl: 2    cyclobenzaprine (FLEXERIL) 10 mg tablet, Take 10 mg by mouth daily at bedtime as needed for muscle spasms, Disp: , Rfl:     gabapentin (NEURONTIN) 100 mg capsule, TAKE 1 CAPSULE BY MOUTH AT BEDTIME, Disp: 30 capsule, Rfl: 2    glucose blood (OneTouch Verio) test strip, Test blood sugar once daily., Disp: 100 strip, Rfl: 5    Lancets 33G MISC, Test blood sugar once daily., Disp: 100 each, Rfl: 5    lifitegrast (Xiidra) 5 % op solution, INSTILL 1 DROP INTO BOTH EYES TWICE A DAY, Disp: , Rfl:     loratadine (CLARITIN) 10 mg tablet, Take 1 tablet (10 mg total) by mouth daily, Disp: 30 tablet, Rfl: 2    montelukast (SINGULAIR) 10 mg tablet, Take 10 mg by mouth daily, Disp: , Rfl:     naproxen (NAPROSYN) 500 mg tablet, Take 1 tablet (500 mg total) by mouth 2 (two) times a day with meals, Disp: 30 tablet, Rfl: 0    nystatin-triamcinolone (MYCOLOG-II) cream, Apply topically 2 (two) times a day, Disp: 30 g, Rfl: 0    Restasis 0.05 % ophthalmic emulsion, Administer 1 drop to both eyes daily as needed,  Disp: , Rfl:     semaglutide, 1 mg/dose, (Ozempic) 4 mg/3 mL injection pen, Inject 1 mg under the skin, Disp: , Rfl:     valsartan-hydrochlorothiazide (DIOVAN-HCT) 160-25 MG per tablet, Take 1 tablet by mouth daily, Disp: 30 tablet, Rfl: 0    amLODIPine (NORVASC) 5 mg tablet, Take 1 tablet (5 mg total) by mouth daily at bedtime (Patient not taking: Reported on 4/2/2024), Disp: 90 tablet, Rfl: 5    cyanocobalamin (VITAMIN B-12) 1000 MCG tablet, TAKE 1 TABLET BY MOUTH EVERY DAY (Patient not taking: Reported on 6/24/2024), Disp: , Rfl:     Empagliflozin-metFORMIN HCl ER (Synjardy XR)  MG TB24, Take 10-1,000 mL by mouth in the morning, Disp: 90 tablet, Rfl: 3    hydrOXYzine HCL (ATARAX) 50 mg tablet, Take 50 mg by mouth daily at bedtime, Disp: , Rfl:     melatonin 3 mg, Take 3 mg by mouth (Patient not taking: Reported on 1/4/2024), Disp: , Rfl:     nystatin (MYCOSTATIN) powder, Apply topically 2 (two) times a day (Patient not taking: Reported on 1/31/2024), Disp: 30 g, Rfl: 0    ACTIVE PROBLEM LIST:  Patient Active Problem List   Diagnosis    Calcific tendonitis of right shoulder    Ankylosing hyperostosis    Benign essential hypertension    Current moderate episode of major depressive disorder (HCC)    Food insecurity    Gout    Hypertension goal BP (blood pressure) < 130/80    Impingement syndrome of shoulder region    Long term current use of insulin (HCC)    Mixed hyperlipidemia    Moderate episode of recurrent major depressive disorder (HCC)    Neck pain    Shoulder pain    Type 2 diabetes mellitus without complication, without long-term current use of insulin (HCC)    Small right pontine stroke with residual dysesthesias    Primary osteoarthritis of left knee    Tear of medial meniscus of left knee, unspecified tear type, unspecified whether old or current tear, initial encounter    Class 2 obesity with body mass index (BMI) of 35.0 to 35.9 in adult       PAST MEDICAL HISTORY:  Past Medical History:    Diagnosis Date    Diabetes mellitus (HCC)     Hyperlipidemia     Hypertension        PAST SURGICAL HISTORY:  Past Surgical History:   Procedure Laterality Date     SECTION      HYSTERECTOMY      TONSILLECTOMY         FAMILY HISTORY:  No family history on file.    SOCIAL HISTORY:  Social History     Socioeconomic History    Marital status: /Civil Union     Spouse name: Not on file    Number of children: Not on file    Years of education: Not on file    Highest education level: Not on file   Occupational History    Not on file   Tobacco Use    Smoking status: Never    Smokeless tobacco: Never   Vaping Use    Vaping status: Never Used   Substance and Sexual Activity    Alcohol use: Not Currently    Drug use: Never    Sexual activity: Not on file   Other Topics Concern    Not on file   Social History Narrative    Not on file     Social Determinants of Health     Financial Resource Strain: Not on file   Food Insecurity: No Food Insecurity (2024)    Hunger Vital Sign     Worried About Running Out of Food in the Last Year: Never true     Ran Out of Food in the Last Year: Never true   Transportation Needs: No Transportation Needs (2024)    PRAPARE - Transportation     Lack of Transportation (Medical): No     Lack of Transportation (Non-Medical): No   Physical Activity: Not on file   Stress: Not on file   Social Connections: Unknown (2024)    Received from [facility]    Social [place]     How often do you feel lonely or isolated from those around you? (Adult - for ages 18 years and over): Not on file   Intimate Partner Violence: Not on file   Housing Stability: Low Risk  (2024)    Housing Stability Vital Sign     Unable to Pay for Housing in the Last Year: No     Number of Times Moved in the Last Year: 0     Homeless in the Last Year: No       Review of Systems   Respiratory:  Negative for shortness of breath and wheezing.    Cardiovascular:  Negative for chest pain.   Neurological:   Positive for numbness.         Objective:  Vitals:    06/24/24 1435   BP: 124/70   BP Location: Right arm   Patient Position: Sitting   Cuff Size: Large   Pulse: 84   SpO2: 96%   Weight: 82.1 kg (181 lb)   Height: 5' (1.524 m)     Body mass index is 35.35 kg/m².     Physical Exam  Constitutional:       Appearance: Normal appearance. She is obese.   HENT:      Nose: Nose normal.      Mouth/Throat:      Mouth: Mucous membranes are moist.   Cardiovascular:      Rate and Rhythm: Normal rate and regular rhythm.      Pulses: no weak pulses.           Dorsalis pedis pulses are 2+ on the right side and 2+ on the left side.   Pulmonary:      Effort: Pulmonary effort is normal.      Breath sounds: Normal breath sounds.   Feet:      Right foot:      Skin integrity: No ulcer, skin breakdown, erythema, warmth, callus or dry skin.      Left foot:      Skin integrity: No ulcer, skin breakdown, erythema, warmth, callus or dry skin.   Neurological:      Mental Status: She is alert and oriented to person, place, and time.   Psychiatric:         Mood and Affect: Mood normal.           RESULTS:  Hemoglobin A1C   Date/Time Value Ref Range Status   12/09/2023 04:49 AM 8.2 (H) Normal 4.0-5.6%; PreDiabetic 5.7-6.4%; Diabetic >=6.5%; Glycemic control for adults with diabetes <7.0% % Final   02/13/2023 08:22 AM 7.2 (H) 4.0 - 5.6 % Final     Comment:     The use of HbA1c to monitor glycemic status is based on normal hemoglobin and HbA composition. This test should not be used in patients with abnormal hemoglobin that affects the half life of the red blood cell or the in vivo glycation rates.      Cholesterol   Date/Time Value Ref Range Status   12/09/2023 04:49  (H) See Comment mg/dL Final     Comment:     Cholesterol:         Pediatric <18 Years        Desirable          <170 mg/dL      Borderline High    170-199 mg/dL      High               >=200 mg/dL        Adult >=18 Years            Desirable        <200 mg/dL      Borderline  High  200-239 mg/dL      High             >239 mg/dL       Triglycerides   Date/Time Value Ref Range Status   12/09/2023 04:49  (H) See Comment mg/dL Final     Comment:     Triglyceride:     0-9Y            <75mg/dL     10Y-17Y         <90 mg/dL       >=18Y     Normal          <150 mg/dL     Borderline High 150-199 mg/dL     High            200-499 mg/dL        Very High       >499 mg/dL    Specimen collection should occur prior to Metamizole administration due to the potential for falsely depressed results.     HDL, Direct   Date/Time Value Ref Range Status   12/09/2023 04:49 AM 40 (L) >=50 mg/dL Final     LDL Calculated   Date/Time Value Ref Range Status   12/09/2023 04:49  (H) 0 - 100 mg/dL Final     Comment:     LDL Cholesterol:     Optimal           <100 mg/dl     Near Optimal      100-129 mg/dl     Above Optimal       Borderline High 130-159 mg/dl       High            160-189 mg/dl       Very High       >189 mg/dl         This screening LDL is a calculated result.   It does not have the accuracy of the Direct Measured LDL in the monitoring of patients with hyperlipidemia and/or statin therapy.   Direct Measure LDL (CTX110) must be ordered separately in these patients.     Hemoglobin   Date/Time Value Ref Range Status   12/09/2023 04:49 AM 12.4 11.5 - 15.4 g/dL Final     Hematocrit   Date/Time Value Ref Range Status   12/09/2023 04:49 AM 39.9 34.8 - 46.1 % Final     Platelets   Date/Time Value Ref Range Status   12/09/2023 04:49  149 - 390 Thousands/uL Final     FREE T4   Date/Time Value Ref Range Status   02/09/2019 10:29 AM NOT APPLICABLE 0.9 - 1.7 ng/dL Final     Sodium   Date/Time Value Ref Range Status   12/09/2023 04:49  135 - 147 mmol/L Final   02/13/2023 08:22  135 - 146 mmol/L Final     BUN   Date/Time Value Ref Range Status   12/09/2023 04:49 AM 16 5 - 25 mg/dL Final   02/13/2023 08:22 AM 9 6 - 20 mg/dL Final     Creatinine   Date/Time Value Ref Range Status   12/09/2023  04:49 AM 0.78 0.60 - 1.30 mg/dL Final     Comment:     Standardized to IDMS reference method   02/13/2023 08:22 AM 0.9 0.5 - 1.0 mg/dL Final      In chart    This note was created with voice recognition software.  Phonic, grammatical and spelling errors may be present within the note as a result.  Patient's shoes and socks removed.    Right Foot/Ankle   Right Foot Inspection  Skin Exam: skin normal and skin intact. No dry skin, no warmth, no callus, no erythema, no maceration, no abnormal color, no pre-ulcer, no ulcer and no callus.     Toe Exam: ROM and strength within normal limits.     Sensory   Monofilament testing: intact    Vascular  The right DP pulse is 2+.     Right Toe  - Comprehensive Exam  Ecchymosis: none  Swelling: none       Left Foot/Ankle  Left Foot Inspection  Skin Exam: skin normal and skin intact. No dry skin, no warmth, no erythema, no maceration, normal color, no pre-ulcer, no ulcer and no callus.     Toe Exam: ROM and strength within normal limits.     Sensory   Monofilament testing: intact    Vascular  The left DP pulse is 2+.     Left Toe  - Comprehensive Exam  Ecchymosis: none  Swelling: none       Assign Risk Category  No deformity present  No loss of protective sensation  No weak pulses  Risk: 0

## 2024-06-25 ENCOUNTER — TELEPHONE (OUTPATIENT)
Dept: ADMINISTRATIVE | Facility: OTHER | Age: 69
End: 2024-06-25

## 2024-06-25 NOTE — LETTER
Diabetic Eye Exam Form    Date Requested: 24  Patient: Theresa Valerio  Patient : 1955   Referring Provider: Mary Kay Tinoco PA-C      DIABETIC Eye Exam Date _______________________________      Type of Exam MUST be documented for Diabetic Eye Exams. Please CHECK ONE.     Retinal Exam       Dilated Retinal Exam       OCT       Optomap-Iris Exam      Fundus Photography       Left Eye - Please check Retinopathy or No Retinopathy        Exam did show retinopathy    Exam did not show retinopathy       Right Eye - Please check Retinopathy or No Retinopathy       Exam did show retinopathy    Exam did not show retinopathy       Comments _2024  _____________________________________________________    Practice Providing Exam ______________________________________________    Exam Performed By (print name) _______________________________________      Provider Signature ___________________________________________________      These reports are needed for  compliance.  Please fax this completed form and a copy of the Diabetic Eye Exam report to our office located at [address] [place] [address] as soon as possible via Fax [phone] attention Ada: Phone [phone]  We thank you for your assistance in treating our mutual patient.

## 2024-06-25 NOTE — TELEPHONE ENCOUNTER
Upon review of the In Basket request and the patient's chart, initial outreach has been made via fax to facility. Please see Contacts section for details.     Thank you  Ada Vaca

## 2024-06-25 NOTE — LETTER
Diabetic Eye Exam Form    Date Requested: 24  Patient: Theresa Valerio  Patient : 1955   Referring Provider: Mary Kay Tinoco PA-C      DIABETIC Eye Exam Date _______________________________      Type of Exam MUST be documented for Diabetic Eye Exams. Please CHECK ONE.     Retinal Exam       Dilated Retinal Exam       OCT       Optomap-Iris Exam      Fundus Photography       Left Eye - Please check Retinopathy or No Retinopathy        Exam did show retinopathy    Exam did not show retinopathy       Right Eye - Please check Retinopathy or No Retinopathy       Exam did show retinopathy    Exam did not show retinopathy       Comments _2024  _______________________________________________________    Practice Providing Exam ______________________________________________    Exam Performed By (print name) _______________________________________      Provider Signature ___________________________________________________      These reports are needed for  compliance.  Please fax this completed form and a copy of the Diabetic Eye Exam report to our office located at [address] [place] [address] as soon as possible via Fax [phone] attention Ada: Phone [phone]  We thank you for your assistance in treating our mutual patient.

## 2024-06-25 NOTE — TELEPHONE ENCOUNTER
----- Message from Yas WOODRUFF sent at 6/24/2024  2:55 PM EDT -----  Regarding: Care Gap Request     06/24/24 2:55 PM    Hello, our patient above has had Diabetic Eye Exam completed/performed. Please assist in updating the patient chart by making an External outreach to [facility] [place] facility located in [place], [place]. The date of service is April of 2024.    Thank you,  Yas Johnson  [facility] [place]

## 2024-07-02 NOTE — TELEPHONE ENCOUNTER
As a follow-up, a second attempt has been made for outreach via fax to facility. Please see Contacts section for details.    Thank you  Ada Vaca

## 2024-07-02 NOTE — TELEPHONE ENCOUNTER
Upon review of the In Basket request we were able to locate, review, and update the patient chart as requested for Diabetic Eye Exam.    Any additional questions or concerns should be emailed to the Practice Liaisons via the appropriate education email address, please do not reply via In Basket.    Thank you  Ada Vaca   [facility]

## 2024-07-13 DIAGNOSIS — G62.9 NEUROPATHY: ICD-10-CM

## 2024-07-13 DIAGNOSIS — I63.50 RIGHT PONTINE STROKE (HCC): ICD-10-CM

## 2024-07-18 ENCOUNTER — TELEPHONE (OUTPATIENT)
Dept: NEUROLOGY | Facility: CLINIC | Age: 69
End: 2024-07-18

## 2024-07-18 RX ORDER — GABAPENTIN 100 MG/1
100 CAPSULE ORAL
Qty: 30 CAPSULE | Refills: 2 | Status: SHIPPED | OUTPATIENT
Start: 2024-07-18 | End: 2024-07-22

## 2024-07-18 NOTE — TELEPHONE ENCOUNTER
Medication: Gabapentin     Dose/Frequency: 100 mg capsules, take 1 capsule by mouth at bedtime     Quantity: 30 capsules    Pharmacy: [facility] in [place]     Office:   [] PCP/Provider -   [x] Speciality/Provider - Neurology, Ros MONREAL    Does the patient have enough for 3 days?   [x] Yes   [] No - Send as HP to POD       Ros:medication pended, please sign if agreeable.

## 2024-07-22 ENCOUNTER — OFFICE VISIT (OUTPATIENT)
Dept: NEUROLOGY | Facility: CLINIC | Age: 69
End: 2024-07-22
Payer: COMMERCIAL

## 2024-07-22 VITALS
WEIGHT: 179.9 LBS | OXYGEN SATURATION: 98 % | BODY MASS INDEX: 35.32 KG/M2 | HEART RATE: 80 BPM | TEMPERATURE: 98 F | HEIGHT: 60 IN | SYSTOLIC BLOOD PRESSURE: 118 MMHG | DIASTOLIC BLOOD PRESSURE: 68 MMHG

## 2024-07-22 DIAGNOSIS — I10 BENIGN ESSENTIAL HYPERTENSION: Primary | ICD-10-CM

## 2024-07-22 DIAGNOSIS — Z86.73 HISTORY OF LACUNAR CEREBROVASCULAR ACCIDENT (CVA): ICD-10-CM

## 2024-07-22 DIAGNOSIS — G47.33 OSA (OBSTRUCTIVE SLEEP APNEA): ICD-10-CM

## 2024-07-22 DIAGNOSIS — I65.21 ATHEROSCLEROSIS OF RIGHT CAROTID ARTERY: ICD-10-CM

## 2024-07-22 DIAGNOSIS — E11.9 TYPE 2 DIABETES MELLITUS WITHOUT COMPLICATION, WITHOUT LONG-TERM CURRENT USE OF INSULIN (HCC): ICD-10-CM

## 2024-07-22 DIAGNOSIS — E78.2 MIXED HYPERLIPIDEMIA: ICD-10-CM

## 2024-07-22 PROCEDURE — 99214 OFFICE O/P EST MOD 30 MIN: CPT

## 2024-07-22 RX ORDER — GABAPENTIN 300 MG/1
300 CAPSULE ORAL
Qty: 30 CAPSULE | Refills: 3 | Status: SHIPPED | OUTPATIENT
Start: 2024-07-22

## 2024-07-22 NOTE — PROGRESS NOTES
Patient ID: Theresa Valerio is a 68 y.o. female who presents to the [facility].    Assessment/Plan:    History of lacunar CVA:    - Recommend completing VAS carotid artery ultrasound that was ordered by JOCELIN Mackay at the last appointment.  Would recommend it would be a good idea to check the patient's extracranial carotid arteries and make sure there is no significant plaque buildup or stenosis.  - Would also recommend that the patient get her lipid panel and hemoglobin A1c completed as soon as possible.  This was ordered previously by the patient's primary care provider to be completed.  Would get this blood work done as soon as possible so that way we know what her cholesterol and hemoglobin A1c looks like moving forward so that way we could further help for stroke prevention.  - Will be increasing the patient's gabapentin dosage from 100 mg at nighttime to 300 mg at nighttime.  For the next week or 2, she is to increase to 2 tablets of the gabapentin 100 mg at bedtime.  Once she has done this, she can discontinue this medication and she can start the gabapentin 300 mg, take 1 capsule by mouth once daily at bedtime to help with some of the numbness/paresthesias that she is experiencing as a late effect of her past stroke.  - Patient denied having sleep study for obstructive sleep apnea again at this time.    - For ongoing stroke prevention continue: aspirin 81 mg once daily, atorvastatin 40 mg once daily    - Discussed the importance of antiplatelet management with the patient to prevent future strokes.   - Recommend to check blood pressure occasionally away from the doctor's office to make sure that those numbers are typically less than 130/80.  If they are frequently higher than that, we recommend checking a little more often and to follow up with primary care team   - Will defer to primary care team for monitoring of cholesterol panel and blood sugar numbers with target LDL cholesterol  of less than 70 and hemoglobin A1c less than 7%  - Recommend following a low salt, mediterranean diet   - Recommend routine physical exercise as tolerated     We will plan for her to return to the office in 6 months time to see on of the APPs or Dr. Francis but would be happy to see her sooner if the need should arise.  If she has any symptoms concerning for TIA or stroke including sudden painless loss of vision or double vision, difficulty speaking or swallowing, vertigo/room spinning that does not quickly resolve, or weakness/numbness/loss of coordination affecting 1 side of the face or body she should proceed by ambulance to the nearest emergency room immediately.     Subjective:    HPI      For Review:    The patient was last seen by myself and JOCELIN Mackay for an initial hospital follow-up appointment on 01/31/2024.  It was noted that the patient had an MRI brain which showed a small acute right pontine stroke.  Most likely etiology at that time was suspected to be small vessel disease from chronically unmanaged vascular risk factors.  She was overall doing well at home and continue to work with PT/OT for stroke deficits.  She had been checking her blood pressures twice daily and taking her medications as prescribed.  She had no new strokelike symptoms reported at the last visit.  However, noted that the patient was still having some numbness and tingling sensation of the left upper extremity from the knee down.  She noted that sometimes this could become painful and at times feeling off balance and having occasional vertigo.  The patient was to continue with taking aspirin 81 mg once daily and Lipitor 40 mg once daily.  The patient had been recommended to acquire a carotid artery ultrasound which had not been completed at the last appointment.  The patient was started on gabapentin 100 mg at bedtime for the left lower extremity nerve pain, also a referral has been placed to cardiology for follow-up on  her echocardiogram as well which did not appear to be completed.     Interval History:    New stroke symptoms/residual symptoms:    Any new, sudden onset weakness, numbness, facial droop, slurred speech, difficulty speaking, trouble swallowing, persistent vertigo, or sudden double vision or vision loss? No new stroke like symptoms     Residual symptoms include: other: numbness and tingling sensation on the left side  , Still having some pain occasionally of the left lower extremity. Gabapentin helped a bit for the pain and her sleep, she feels as though the dosage could be slightly higher. Sometimes she is taking two capsules of the medication at bedtime.     Stroke Etiology and Risk Factor modification:    This was a(n)  lacunar  stroke, most likely related to Small Vessel/microvascular    Stroke risk factors were evaluated including: hypertension, hyperlipidemia, Type 2 diabetes mellitus     AP/AC therapy: Aspirin 81 mg once daily     Statin therapy:Atorvastatin 40 mg once daily     Blood pressure today and as of late: 118/68 BP today in the office. Blood pressure has been good when she checks it at home as well.     Most recent LDL: 152 mg/dL as of 12/09/2024    Most recent hemoglobin A1C: 8.2% as of 12/09/2024    Cardiology evaluation? Any cardiac monitoring required?: Has not seen cardiology at this time    Endocrinology evaluation? Following proper glycemic treatment/diet? None    Lifestyle history/modifications:    Diet/Exercise regimen: She is currently trying to eat healthier and live a healthier lifestyle at this time. She is watching what she is eating with a new genevieve. Does exercise and she is trying to go on walks three times a week.     Any physical therapy, occupational therapy or speech therapy performed/required at this time?: No PT/OT anymore at this time    Any difficulty with sleep? Still having some issues with falling asleep and staying asleep. Gabapentin has helped slightly with sleep.     Any  history of sleep apnea? CPAP compliance?: Does not want to have a sleep study done at this time    Post-stroke depression/anxiety? No depression/anxiety     Any history of smoking? Stopped smoking 15 years ago       Lab Results   Component Value Date/Time    CHOLESTEROL 225 (H) 12/09/2023 04:49 AM     Lab Results   Component Value Date/Time    TRIG 165 (H) 12/09/2023 04:49 AM     Lab Results   Component Value Date/Time    HDL 40 (L) 12/09/2023 04:49 AM     Lab Results   Component Value Date/Time    LDLCALC 152 (H) 12/09/2023 04:49 AM       Lab Results   Component Value Date/Time    HGBA1C 8.2 (H) 12/09/2023 04:49 AM    HGBA1C 7.2 (H) 02/13/2023 08:22 AM     Lab Results   Component Value Date/Time     12/09/2023 04:49 AM     (H) 02/13/2023 08:22 AM     (H) 10/08/2020 07:24 AM           Past Medical History:   Diagnosis Date    Diabetes mellitus (HCC)     Hyperlipidemia     Hypertension        Current Outpatient Medications:     albuterol (PROVENTIL HFA,VENTOLIN HFA) 90 mcg/act inhaler, Inhale 2 puffs 4 (four) times a day As needed, Disp: , Rfl:     amLODIPine (NORVASC) 5 mg tablet, Take 1 tablet (5 mg total) by mouth daily at bedtime (Patient not taking: Reported on 4/2/2024), Disp: 90 tablet, Rfl: 5    aspirin 81 mg chewable tablet, Chew 1 tablet (81 mg total) daily Please buy over the counter Do not start before December 10, 2023., Disp: , Rfl: 0    atorvastatin (LIPITOR) 40 mg tablet, Take 1 tablet (40 mg total) by mouth daily, Disp: 40 tablet, Rfl: 1    Blood Glucose Monitoring Suppl (OneTouch Verio) w/Device KIT, Test blood sugar once daily., Disp: 1 kit, Rfl: 2    cyanocobalamin (VITAMIN B-12) 1000 MCG tablet, TAKE 1 TABLET BY MOUTH EVERY DAY (Patient not taking: Reported on 6/24/2024), Disp: , Rfl:     cyclobenzaprine (FLEXERIL) 10 mg tablet, Take 10 mg by mouth daily at bedtime as needed for muscle spasms, Disp: , Rfl:     Empagliflozin-metFORMIN HCl ER (Synjardy XR)  MG TB24, Take  10-1,000 mL by mouth in the morning, Disp: 90 tablet, Rfl: 3    gabapentin (NEURONTIN) 100 mg capsule, TAKE 1 CAPSULE BY MOUTH EVERYDAY AT BEDTIME, Disp: 30 capsule, Rfl: 2    glucose blood (OneTouch Verio) test strip, Test blood sugar once daily., Disp: 100 strip, Rfl: 5    hydrOXYzine HCL (ATARAX) 50 mg tablet, Take 50 mg by mouth daily at bedtime, Disp: , Rfl:     Lancets 33G MISC, Test blood sugar once daily., Disp: 100 each, Rfl: 5    lifitegrast (Xiidra) 5 % op solution, INSTILL 1 DROP INTO BOTH EYES TWICE A DAY, Disp: , Rfl:     loratadine (CLARITIN) 10 mg tablet, Take 1 tablet (10 mg total) by mouth daily, Disp: 30 tablet, Rfl: 2    melatonin 3 mg, Take 3 mg by mouth (Patient not taking: Reported on 1/4/2024), Disp: , Rfl:     montelukast (SINGULAIR) 10 mg tablet, Take 10 mg by mouth daily, Disp: , Rfl:     naproxen (NAPROSYN) 500 mg tablet, Take 1 tablet (500 mg total) by mouth 2 (two) times a day with meals, Disp: 30 tablet, Rfl: 0    nystatin (MYCOSTATIN) powder, Apply topically 2 (two) times a day (Patient not taking: Reported on 1/31/2024), Disp: 30 g, Rfl: 0    nystatin-triamcinolone (MYCOLOG-II) cream, Apply topically 2 (two) times a day, Disp: 30 g, Rfl: 0    Restasis 0.05 % ophthalmic emulsion, Administer 1 drop to both eyes daily as needed, Disp: , Rfl:     semaglutide, 1 mg/dose, (Ozempic) 4 mg/3 mL injection pen, Inject 1 mg under the skin, Disp: , Rfl:     valsartan-hydrochlorothiazide (DIOVAN-HCT) 160-25 MG per tablet, Take 1 tablet by mouth daily, Disp: 30 tablet, Rfl: 0     Objective:    Physical Exam:                                                                 Vitals:            Constitutional:    There were no vitals taken for this visit.  BP Readings from Last 3 Encounters:   06/24/24 124/70   04/02/24 149/74   01/31/24 118/70     Pulse Readings from Last 3 Encounters:   06/24/24 84   04/02/24 80   01/31/24 87         Well developed, well nourished, well groomed. No dysmorphic  features.       Psychiatric:  Normal behavior and appropriate affect        Neurological Examination:     Mental status/cognitive function:   Orientated to time, place and person. Recent and remote memory intact. Attention span and concentration as well as fund of knowledge are appropriate for age. Normal language and spontaneous speech.     Cranial Nerves:  II-visual fields full.   III, IV, VI-Pupils were equal, round, and reactive to light and accomodation. Extraocular movements were full and conjugate without nystagmus. Conjugate gaze, normal smooth pursuits, normal saccades   V-facial sensation symmetric.    VII-facial expression symmetric, intact forehead wrinkle, strong eye closure, symmetric smile    VIII-hearing grossly intact bilaterally   IX, X-palate elevation symmetric, no dysarthria.   XI-shoulder shrug strength intact    XII-tongue protrusion midline.    Motor Exam: symmetric bulk and tone throughout, no pronator drift. Power/strength 5/5 bilateral upper and lower extremities, no atrophy, fasciculations or abnormal movements noted.   Sensory: grossly intact light touch in all extremities except for slight decrease sensation of the left lower extremity compared to the right lower extremity.  Reflexes: brachioradialis 2+, biceps 2+, knee 2+, bilaterally  Coordination: Finger nose finger intact bilaterally, no apparent dysmetria, ataxia or tremor noted  Gait: steady casual and tandem gait.        ROS:    Review of Systems   Constitutional:  Negative for appetite change, fatigue and fever.   HENT: Negative.  Negative for hearing loss, tinnitus, trouble swallowing and voice change.    Eyes: Negative.  Negative for photophobia, pain and visual disturbance.   Respiratory: Negative.  Negative for shortness of breath.    Cardiovascular: Negative.  Negative for palpitations.   Gastrointestinal: Negative.  Negative for nausea and vomiting.   Endocrine: Negative.  Negative for cold intolerance.   Genitourinary:  Negative.  Negative for dysuria, frequency and urgency.   Musculoskeletal:  Negative for back pain, gait problem, myalgias, neck pain and neck stiffness.   Skin: Negative.  Negative for rash.   Allergic/Immunologic: Negative.    Neurological:  Positive for numbness. Negative for dizziness, tremors, seizures, syncope, facial asymmetry, speech difficulty, weakness, light-headedness and headaches.   Hematological: Negative.  Does not bruise/bleed easily.   Psychiatric/Behavioral: Negative.  Negative for confusion, hallucinations and sleep disturbance.    All other systems reviewed and are negative.    I have spent 30 minutes today on this case including chart review, performing history and exam, patient counseling, and documentation/communication      Aknur Lu PA-C  7/22/2024 8:08 AM

## 2024-07-22 NOTE — PATIENT INSTRUCTIONS
History of lacunar CVA:    - Recommend completing VAS carotid artery ultrasound that was ordered by JOCELIN Mackay at the last appointment.  Would recommend it would be a good idea to check the patient's extracranial carotid arteries and make sure there is no significant plaque buildup or stenosis.  - Would also recommend that the patient get her lipid panel and hemoglobin A1c completed as soon as possible.  This was ordered previously by the patient's primary care provider to be completed.  Would get this blood work done as soon as possible so that way we know what her cholesterol and hemoglobin A1c looks like moving forward so that way we could further help for stroke prevention.  - Will be increasing the patient's gabapentin dosage from 100 mg at nighttime to 300 mg at nighttime.  For the next week or 2, she is to increase to 2 tablets of the gabapentin 100 mg at bedtime.  Once she has done this, she can discontinue this medication and she can start the gabapentin 300 mg, take 1 capsule by mouth once daily at bedtime to help with some of the numbness/paresthesias that she is experiencing as a late effect of her past stroke.  - Patient denied having sleep study for obstructive sleep apnea again at this time.    - For ongoing stroke prevention continue: aspirin 81 mg once daily, atorvastatin 40 mg once daily    - Discussed the importance of antiplatelet management with the patient to prevent future strokes.   - Recommend to check blood pressure occasionally away from the doctor's office to make sure that those numbers are typically less than 130/80.  If they are frequently higher than that, we recommend checking a little more often and to follow up with primary care team   - Will defer to primary care team for monitoring of cholesterol panel and blood sugar numbers with target LDL cholesterol of less than 70 and hemoglobin A1c less than 7%  - Recommend following a low salt, mediterranean diet   - Recommend  routine physical exercise as tolerated     We will plan for her to return to the office in 6 months time to see on of the APPs or Dr. Francis but would be happy to see her sooner if the need should arise.  If she has any symptoms concerning for TIA or stroke including sudden painless loss of vision or double vision, difficulty speaking or swallowing, vertigo/room spinning that does not quickly resolve, or weakness/numbness/loss of coordination affecting 1 side of the face or body she should proceed by ambulance to the nearest emergency room immediately.

## 2024-07-23 ENCOUNTER — HOSPITAL ENCOUNTER (EMERGENCY)
Facility: HOSPITAL | Age: 69
Discharge: HOME | End: 2024-07-23
Payer: COMMERCIAL

## 2024-07-23 ENCOUNTER — APPOINTMENT (EMERGENCY)
Dept: RADIOLOGY | Facility: HOSPITAL | Age: 69
End: 2024-07-23
Payer: COMMERCIAL

## 2024-07-23 VITALS
DIASTOLIC BLOOD PRESSURE: 74 MMHG | SYSTOLIC BLOOD PRESSURE: 175 MMHG | TEMPERATURE: 97.9 F | OXYGEN SATURATION: 96 % | WEIGHT: 182.4 LBS | RESPIRATION RATE: 18 BRPM | BODY MASS INDEX: 35.62 KG/M2 | HEART RATE: 92 BPM

## 2024-07-23 DIAGNOSIS — M25.562 ACUTE PAIN OF LEFT KNEE: Primary | ICD-10-CM

## 2024-07-23 DIAGNOSIS — R03.0 ELEVATED BLOOD PRESSURE READING: ICD-10-CM

## 2024-07-23 PROCEDURE — 99283 EMERGENCY DEPT VISIT LOW MDM: CPT

## 2024-07-23 PROCEDURE — 73560 X-RAY EXAM OF KNEE 1 OR 2: CPT

## 2024-07-23 PROCEDURE — 99284 EMERGENCY DEPT VISIT MOD MDM: CPT

## 2024-07-23 RX ORDER — ACETAMINOPHEN 325 MG/1
650 TABLET ORAL ONCE
Status: COMPLETED | OUTPATIENT
Start: 2024-07-23 | End: 2024-07-23

## 2024-07-23 RX ORDER — ACETAMINOPHEN 500 MG
500 TABLET ORAL EVERY 4 HOURS PRN
Qty: 20 TABLET | Refills: 0 | Status: SHIPPED | OUTPATIENT
Start: 2024-07-23 | End: 2024-07-28

## 2024-07-23 RX ADMIN — Medication 2 G: at 21:04

## 2024-07-23 RX ADMIN — ACETAMINOPHEN 650 MG: 325 TABLET, FILM COATED ORAL at 21:04

## 2024-07-24 PROBLEM — Z00.00 MEDICARE ANNUAL WELLNESS VISIT, SUBSEQUENT: Status: RESOLVED | Noted: 2024-06-24 | Resolved: 2024-07-24

## 2024-07-24 NOTE — ED PROVIDER NOTES
History  Chief Complaint   Patient presents with    Knee Pain     Pt states L knee pain starting about two days ago, no injury or fall. Tried tylenol with no relief      Patient is a 68y F p/w concern of knee pain. Patient reports approximately 6-7 mos ago she noted knee pain. She was seen by a chiropractor who ordered an MRI that demonstrated a medial meniscal tear. Patient reports she was seen by ortho who said she had arthritis and recommended CS injection. Chart review demonstrates that orthopedic surgery reviewed the meniscal tear and discussed treatment plans ranging from gentle range of motion of physical therapy to total knee replacement.  Patient was instructed to follow-up in 6 weeks however it does not appear she followed up.  She notes over the last 2 days some mild knee pain.  She reports this is a trend atraumatic and she did not fall on it.  She denies fevers, chills, redness, swelling.  Denies rash.  Denies trauma to the area.  Notes when she woke up in the morning and took a couple steps she had some discomfort on the left side.  She reports she is otherwise well and denies any additional hip or ankle pain on the same side.        Prior to Admission Medications   Prescriptions Last Dose Informant Patient Reported? Taking?   Blood Glucose Monitoring Suppl (OneTouch Verio) w/Device KIT  Self No No   Sig: Test blood sugar once daily.   Patient not taking: Reported on 7/22/2024   Empagliflozin-metFORMIN HCl ER (Synjardy XR)  MG TB24  Self No No   Sig: Take 10-1,000 mL by mouth in the morning   Lancets 33G MISC  Self No No   Sig: Test blood sugar once daily.   Patient not taking: Reported on 7/22/2024   Restasis 0.05 % ophthalmic emulsion  Self Yes No   Sig: Administer 1 drop to both eyes daily as needed   albuterol (PROVENTIL HFA,VENTOLIN HFA) 90 mcg/act inhaler  Self Yes No   Sig: Inhale 2 puffs 4 (four) times a day As needed   amLODIPine (NORVASC) 5 mg tablet  Self No No   Sig: Take 1 tablet (5  mg total) by mouth daily at bedtime   Patient not taking: Reported on 4/2/2024   aspirin 81 mg chewable tablet  Self No No   Sig: Chew 1 tablet (81 mg total) daily Please buy over the counter Do not start before December 10, 2023.   atorvastatin (LIPITOR) 40 mg tablet  Self No No   Sig: Take 1 tablet (40 mg total) by mouth daily   cyanocobalamin (VITAMIN B-12) 1000 MCG tablet  Self Yes No   Sig: TAKE 1 TABLET BY MOUTH EVERY DAY   Patient not taking: Reported on 6/24/2024   cyclobenzaprine (FLEXERIL) 10 mg tablet  Self Yes No   Sig: Take 10 mg by mouth daily at bedtime as needed for muscle spasms   gabapentin (Neurontin) 300 mg capsule   No No   Sig: Take 1 capsule (300 mg total) by mouth daily at bedtime   glucose blood (OneTouch Verio) test strip  Self No No   Sig: Test blood sugar once daily.   Patient not taking: Reported on 7/22/2024   hydrOXYzine HCL (ATARAX) 50 mg tablet  Self Yes No   Sig: Take 50 mg by mouth daily at bedtime   lifitegrast (Xiidra) 5 % op solution  Self Yes No   Sig: Administer 1 drop to both eyes 2 (two) times a day PRN   loratadine (CLARITIN) 10 mg tablet  Self No No   Sig: Take 1 tablet (10 mg total) by mouth daily   melatonin 3 mg  Self Yes No   Sig: Take 3 mg by mouth   Patient not taking: Reported on 1/4/2024   montelukast (SINGULAIR) 10 mg tablet  Self Yes No   Sig: Take 10 mg by mouth daily   Patient not taking: Reported on 7/22/2024   naproxen (NAPROSYN) 500 mg tablet  Self No No   Sig: Take 1 tablet (500 mg total) by mouth 2 (two) times a day with meals   Patient not taking: Reported on 7/22/2024   nystatin (MYCOSTATIN) powder  Self No No   Sig: Apply topically 2 (two) times a day   Patient not taking: Reported on 1/31/2024   nystatin-triamcinolone (MYCOLOG-II) cream  Self No No   Sig: Apply topically 2 (two) times a day   Patient not taking: Reported on 7/22/2024   semaglutide, 1 mg/dose, (Ozempic) 4 mg/3 mL injection pen  Self Yes No   Sig: Inject 1 mg under the skin    valsartan-hydrochlorothiazide (DIOVAN-HCT) 160-25 MG per tablet  Self No No   Sig: Take 1 tablet by mouth daily      Facility-Administered Medications: None       Past Medical History:   Diagnosis Date    Diabetes mellitus (HCC)     Hyperlipidemia     Hypertension        Past Surgical History:   Procedure Laterality Date     SECTION      HYSTERECTOMY      TONSILLECTOMY         History reviewed. No pertinent family history.  I have reviewed and agree with the history as documented.    E-Cigarette/Vaping    E-Cigarette Use Never User      E-Cigarette/Vaping Substances     Social History     Tobacco Use    Smoking status: Never    Smokeless tobacco: Never   Vaping Use    Vaping status: Never Used   Substance Use Topics    Alcohol use: Not Currently    Drug use: Never       Review of Systems   Constitutional:  Negative for chills and fever.   HENT:  Negative for ear pain and sore throat.    Eyes:  Negative for pain and visual disturbance.   Respiratory:  Negative for cough and shortness of breath.    Cardiovascular:  Negative for chest pain and palpitations.   Gastrointestinal:  Negative for abdominal pain and vomiting.   Genitourinary:  Negative for dysuria and hematuria.   Musculoskeletal:  Negative for arthralgias and back pain.        Knee pain   Skin:  Negative for color change and rash.   Neurological:  Negative for seizures and syncope.   All other systems reviewed and are negative.      Physical Exam  Physical Exam  Vitals and nursing note reviewed.   Constitutional:       General: She is not in acute distress.     Appearance: Normal appearance. She is not ill-appearing, toxic-appearing or diaphoretic.   HENT:      Head: Normocephalic and atraumatic.   Eyes:      General: No scleral icterus.        Right eye: No discharge.         Left eye: No discharge.      Extraocular Movements: Extraocular movements intact.      Conjunctiva/sclera: Conjunctivae normal.   Cardiovascular:      Rate and Rhythm: Normal  rate.      Pulses: Normal pulses.   Pulmonary:      Effort: Pulmonary effort is normal. No respiratory distress.   Abdominal:      General: Abdomen is flat.      Palpations: Abdomen is soft.   Musculoskeletal:         General: Normal range of motion.      Cervical back: Normal range of motion. No rigidity.      Comments: L knee without overlying redness. No pain w/ knee examination, negative ant/post drawer tests. Negative patellar grind test. Normal sensation of knee.   Skin:     General: Skin is warm and dry.      Coloration: Skin is not jaundiced.      Findings: No bruising or lesion.   Neurological:      General: No focal deficit present.      Mental Status: She is alert and oriented to person, place, and time. Mental status is at baseline.   Psychiatric:         Mood and Affect: Mood normal.         Behavior: Behavior normal.         Thought Content: Thought content normal.         Judgment: Judgment normal.         Vital Signs  ED Triage Vitals [07/23/24 2032]   Temperature Pulse Respirations Blood Pressure SpO2   97.9 °F (36.6 °C) 92 18 (!) 175/74 96 %      Temp Source Heart Rate Source Patient Position - Orthostatic VS BP Location FiO2 (%)   Temporal Monitor Sitting Left arm --      Pain Score       --           Vitals:    07/23/24 2032   BP: (!) 175/74   Pulse: 92   Patient Position - Orthostatic VS: Sitting         Visual Acuity      ED Medications  Medications   Diclofenac Sodium (VOLTAREN) 1 % topical gel 2 g (2 g Topical Given 7/23/24 2104)   acetaminophen (TYLENOL) tablet 650 mg (650 mg Oral Given 7/23/24 2104)       Diagnostic Studies  Results Reviewed       None                   XR knee 1 or 2 views left   ED Interpretation by Ilya Hernandez DO (07/23 2125)   No acute fracture/dislocation                 Procedures  Procedures         ED Course                                               Medical Decision Making  68y F p/w knee pain    Ddx includes fracture, dislocation, contusion,  arthritis, soft tissue injury    Doubt septic arthritis    Plan: XR    XR appears largely unchanged when compared to prior. Patient able to tolerate weight however pt placed in crutches and knee immobilizer. Instructed to see orthopedics, ambulatory referral placed. Instructed to follow-up with PCP for repeat BP check, patient verbalized understanding. PT d/c home in good condition.        Amount and/or Complexity of Data Reviewed  Radiology: ordered and independent interpretation performed.    Risk  OTC drugs.                 Disposition  Final diagnoses:   Acute pain of left knee   Elevated blood pressure reading     Time reflects when diagnosis was documented in both MDM as applicable and the Disposition within this note       Time User Action Codes Description Comment    7/23/2024  9:33 PM Ilya Hernandez Add [M25.562] Acute pain of left knee     7/23/2024  9:35 PM Ilya Hernandez Add [R03.0] Elevated blood pressure reading           ED Disposition       ED Disposition   Discharge    Condition   Stable    Date/Time   Tue Jul 23, 2024  9:35 PM    Comment   Theresa Bennett discharge to home/self care.                   Follow-up Information       Follow up With Specialties Details Why Contact Info Additional Information    Mary Kay Tinoco PA-C Internal Medicine, Physician Assistant In 2 days for repeat blood pressure reading [address]  [address]  [facility] [address]  [phone]       [facility]   [address]  [facility] [address] [phone] [facility] [place], [address], [place], [address], [phone]            Discharge Medication List as of 7/23/2024 10:00 PM        START taking these medications    Details   acetaminophen (TYLENOL) 500 mg tablet Take 1 tablet (500 mg total) by mouth every 4 (four) hours as needed for mild pain or moderate pain for up to 5 days,  Starting Tue 7/23/2024, Until Sun 7/28/2024 at 2359, Normal      Diclofenac Sodium (VOLTAREN) 1 % Apply 2 g topically 4 (four) times a day For pain to affected area, Starting Tue 7/23/2024, Normal           CONTINUE these medications which have NOT CHANGED    Details   albuterol (PROVENTIL HFA,VENTOLIN HFA) 90 mcg/act inhaler Inhale 2 puffs 4 (four) times a day As needed, Starting Mon 4/10/2023, Historical Med      amLODIPine (NORVASC) 5 mg tablet Take 1 tablet (5 mg total) by mouth daily at bedtime, Starting Thu 1/4/2024, Normal      aspirin 81 mg chewable tablet Chew 1 tablet (81 mg total) daily Please buy over the counter Do not start before December 10, 2023., Starting Sun 12/10/2023, No Print      atorvastatin (LIPITOR) 40 mg tablet Take 1 tablet (40 mg total) by mouth daily, Starting Sat 12/9/2023, Normal      Blood Glucose Monitoring Suppl (OneTouch Verio) w/Device KIT Test blood sugar once daily., Normal      cyanocobalamin (VITAMIN B-12) 1000 MCG tablet TAKE 1 TABLET BY MOUTH EVERY DAY, Historical Med      cyclobenzaprine (FLEXERIL) 10 mg tablet Take 10 mg by mouth daily at bedtime as needed for muscle spasms, Starting Tue 3/26/2024, Historical Med      Empagliflozin-metFORMIN HCl ER (Synjardy XR)  MG TB24 Take 10-1,000 mL by mouth in the morning, Starting Thu 1/4/2024, Normal      gabapentin (Neurontin) 300 mg capsule Take 1 capsule (300 mg total) by mouth daily at bedtime, Starting Mon 7/22/2024, Normal      glucose blood (OneTouch Verio) test strip Test blood sugar once daily., Normal      hydrOXYzine HCL (ATARAX) 50 mg tablet Take 50 mg by mouth daily at bedtime, Starting Fri 10/20/2023, Historical Med      Lancets 33G MISC Test blood sugar once daily., Normal      lifitegrast (Xiidra) 5 % op solution Administer 1 drop to both eyes 2 (two) times a day PRN, Starting Mon 2/13/2023, Historical Med      loratadine (CLARITIN) 10 mg tablet Take 1 tablet (10 mg total) by mouth daily, Starting Mon  6/24/2024, Normal      melatonin 3 mg Take 3 mg by mouth, Historical Med      montelukast (SINGULAIR) 10 mg tablet Take 10 mg by mouth daily, Starting Fri 2/17/2023, Historical Med      naproxen (NAPROSYN) 500 mg tablet Take 1 tablet (500 mg total) by mouth 2 (two) times a day with meals, Starting Mon 4/1/2019, Print      nystatin (MYCOSTATIN) powder Apply topically 2 (two) times a day, Starting Tue 4/25/2023, Normal      nystatin-triamcinolone (MYCOLOG-II) cream Apply topically 2 (two) times a day, Starting Tue 4/25/2023, Normal      Restasis 0.05 % ophthalmic emulsion Administer 1 drop to both eyes daily as needed, Starting Mon 4/1/2024, Historical Med      semaglutide, 1 mg/dose, (Ozempic) 4 mg/3 mL injection pen Inject 1 mg under the skin, Starting Mon 3/27/2023, Historical Med      valsartan-hydrochlorothiazide (DIOVAN-HCT) 160-25 MG per tablet Take 1 tablet by mouth daily, Starting Sat 12/9/2023, Normal                 PDMP Review         Value Time User    PDMP Reviewed  Yes 12/8/2023 12:24 PM Adiel Vargas MD            ED Provider  Electronically Signed by             Ilya Hernandez,   07/24/24 0009       Ilya Hernandez,   07/24/24 0010

## 2024-07-25 ENCOUNTER — OFFICE VISIT (OUTPATIENT)
Dept: OBGYN CLINIC | Facility: CLINIC | Age: 69
End: 2024-07-25
Payer: COMMERCIAL

## 2024-07-25 VITALS — HEIGHT: 61 IN | WEIGHT: 180 LBS | BODY MASS INDEX: 33.99 KG/M2

## 2024-07-25 DIAGNOSIS — M25.562 LEFT KNEE PAIN, UNSPECIFIED CHRONICITY: ICD-10-CM

## 2024-07-25 DIAGNOSIS — M76.52 PATELLAR TENDONITIS OF LEFT KNEE: Primary | ICD-10-CM

## 2024-07-25 DIAGNOSIS — M70.52 PES ANSERINUS BURSITIS OF LEFT KNEE: ICD-10-CM

## 2024-07-25 PROCEDURE — 99214 OFFICE O/P EST MOD 30 MIN: CPT | Performed by: STUDENT IN AN ORGANIZED HEALTH CARE EDUCATION/TRAINING PROGRAM
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
## 2024-08-22 ENCOUNTER — LAB (OUTPATIENT)
Dept: LAB | Facility: CLINIC | Age: 69
End: 2024-08-22
Payer: COMMERCIAL

## 2024-08-22 DIAGNOSIS — E78.2 MIXED HYPERLIPIDEMIA: ICD-10-CM

## 2024-08-22 DIAGNOSIS — Z11.59 NEED FOR HEPATITIS C SCREENING TEST: ICD-10-CM

## 2024-08-22 DIAGNOSIS — E11.9 TYPE 2 DIABETES MELLITUS WITHOUT COMPLICATION, WITHOUT LONG-TERM CURRENT USE OF INSULIN (HCC): ICD-10-CM

## 2024-08-22 DIAGNOSIS — R53.83 OTHER FATIGUE: ICD-10-CM

## 2024-08-22 LAB
ANION GAP SERPL CALCULATED.3IONS-SCNC: 10 MMOL/L (ref 4–13)
BASOPHILS # BLD AUTO: 0.06 THOUSANDS/ÂΜL (ref 0–0.1)
BASOPHILS NFR BLD AUTO: 1 % (ref 0–1)
BUN SERPL-MCNC: 11 MG/DL (ref 5–25)
CALCIUM SERPL-MCNC: 9.5 MG/DL (ref 8.4–10.2)
CHLORIDE SERPL-SCNC: 99 MMOL/L (ref 96–108)
CHOLEST SERPL-MCNC: 166 MG/DL
CO2 SERPL-SCNC: 32 MMOL/L (ref 21–32)
CREAT SERPL-MCNC: 0.8 MG/DL (ref 0.6–1.3)
CREAT UR-MCNC: 68 MG/DL
EOSINOPHIL # BLD AUTO: 0.14 THOUSAND/ÂΜL (ref 0–0.61)
EOSINOPHIL NFR BLD AUTO: 2 % (ref 0–6)
ERYTHROCYTE [DISTWIDTH] IN BLOOD BY AUTOMATED COUNT: 14.7 % (ref 11.6–15.1)
EST. AVERAGE GLUCOSE BLD GHB EST-MCNC: 194 MG/DL
GFR SERPL CREATININE-BSD FRML MDRD: 75 ML/MIN/1.73SQ M
GLUCOSE P FAST SERPL-MCNC: 148 MG/DL (ref 65–99)
HBA1C MFR BLD: 8.4 %
HCT VFR BLD AUTO: 43.8 % (ref 34.8–46.1)
HDLC SERPL-MCNC: 39 MG/DL
HGB BLD-MCNC: 12.9 G/DL (ref 11.5–15.4)
IMM GRANULOCYTES # BLD AUTO: 0.02 THOUSAND/UL (ref 0–0.2)
IMM GRANULOCYTES NFR BLD AUTO: 0 % (ref 0–2)
LDLC SERPL CALC-MCNC: 109 MG/DL (ref 0–100)
LYMPHOCYTES # BLD AUTO: 2.2 THOUSANDS/ÂΜL (ref 0.6–4.47)
LYMPHOCYTES NFR BLD AUTO: 31 % (ref 14–44)
MCH RBC QN AUTO: 25.9 PG (ref 26.8–34.3)
MCHC RBC AUTO-ENTMCNC: 29.5 G/DL (ref 31.4–37.4)
MCV RBC AUTO: 88 FL (ref 82–98)
MICROALBUMIN UR-MCNC: <7 MG/L
MONOCYTES # BLD AUTO: 0.58 THOUSAND/ÂΜL (ref 0.17–1.22)
MONOCYTES NFR BLD AUTO: 8 % (ref 4–12)
NEUTROPHILS # BLD AUTO: 4.07 THOUSANDS/ÂΜL (ref 1.85–7.62)
NEUTS SEG NFR BLD AUTO: 58 % (ref 43–75)
NONHDLC SERPL-MCNC: 127 MG/DL
NRBC BLD AUTO-RTO: 0 /100 WBCS
PLATELET # BLD AUTO: 323 THOUSANDS/UL (ref 149–390)
PMV BLD AUTO: 10.1 FL (ref 8.9–12.7)
POTASSIUM SERPL-SCNC: 3.2 MMOL/L (ref 3.5–5.3)
RBC # BLD AUTO: 4.98 MILLION/UL (ref 3.81–5.12)
SODIUM SERPL-SCNC: 141 MMOL/L (ref 135–147)
TRIGL SERPL-MCNC: 89 MG/DL
WBC # BLD AUTO: 7.07 THOUSAND/UL (ref 4.31–10.16)

## 2024-08-22 PROCEDURE — 86803 HEPATITIS C AB TEST: CPT

## 2024-08-22 PROCEDURE — 85025 COMPLETE CBC W/AUTO DIFF WBC: CPT

## 2024-08-22 PROCEDURE — 36415 COLL VENOUS BLD VENIPUNCTURE: CPT

## 2024-08-22 PROCEDURE — 80061 LIPID PANEL: CPT

## 2024-08-22 PROCEDURE — 83036 HEMOGLOBIN GLYCOSYLATED A1C: CPT

## 2024-08-22 PROCEDURE — 82043 UR ALBUMIN QUANTITATIVE: CPT

## 2024-08-22 PROCEDURE — 80048 BASIC METABOLIC PNL TOTAL CA: CPT

## 2024-08-22 PROCEDURE — 82570 ASSAY OF URINE CREATININE: CPT

## 2024-08-23 DIAGNOSIS — E87.6 HYPOKALEMIA: Primary | ICD-10-CM

## 2024-08-23 LAB — HCV AB SER QL: NORMAL

## 2024-08-23 RX ORDER — POTASSIUM CHLORIDE 1500 MG/1
20 TABLET, EXTENDED RELEASE ORAL 2 TIMES DAILY
Qty: 6 TABLET | Refills: 0 | Status: SHIPPED | OUTPATIENT
Start: 2024-08-23 | End: 2024-08-26 | Stop reason: CLARIF

## 2024-08-26 ENCOUNTER — OFFICE VISIT (OUTPATIENT)
Dept: FAMILY MEDICINE CLINIC | Facility: CLINIC | Age: 69
End: 2024-08-26
Payer: COMMERCIAL

## 2024-08-26 VITALS
WEIGHT: 182 LBS | HEART RATE: 88 BPM | HEIGHT: 61 IN | BODY MASS INDEX: 34.36 KG/M2 | DIASTOLIC BLOOD PRESSURE: 60 MMHG | TEMPERATURE: 98.9 F | OXYGEN SATURATION: 93 % | SYSTOLIC BLOOD PRESSURE: 122 MMHG

## 2024-08-26 DIAGNOSIS — I63.50 RIGHT PONTINE STROKE (HCC): ICD-10-CM

## 2024-08-26 DIAGNOSIS — Z79.4 LONG TERM CURRENT USE OF INSULIN (HCC): ICD-10-CM

## 2024-08-26 DIAGNOSIS — I65.23 CAROTID STENOSIS, BILATERAL: ICD-10-CM

## 2024-08-26 DIAGNOSIS — R53.83 OTHER FATIGUE: ICD-10-CM

## 2024-08-26 DIAGNOSIS — E78.2 MIXED HYPERLIPIDEMIA: ICD-10-CM

## 2024-08-26 DIAGNOSIS — R26.89 BALANCE PROBLEM: ICD-10-CM

## 2024-08-26 DIAGNOSIS — I10 HYPERTENSION GOAL BP (BLOOD PRESSURE) < 130/80: ICD-10-CM

## 2024-08-26 DIAGNOSIS — I10 BENIGN ESSENTIAL HYPERTENSION: Primary | ICD-10-CM

## 2024-08-26 DIAGNOSIS — R53.1 INCREASED WEAKNESS WHEN AMBULATING: ICD-10-CM

## 2024-08-26 DIAGNOSIS — E11.9 DM TYPE 2, NOT AT GOAL (HCC): ICD-10-CM

## 2024-08-26 DIAGNOSIS — M79.605 LEG PAIN, ANTERIOR, LEFT: ICD-10-CM

## 2024-08-26 DIAGNOSIS — Z86.73 HISTORY OF LACUNAR CEREBROVASCULAR ACCIDENT (CVA): ICD-10-CM

## 2024-08-26 DIAGNOSIS — J30.2 SEASONAL ALLERGIES: ICD-10-CM

## 2024-08-26 DIAGNOSIS — E87.6 HYPOKALEMIA: ICD-10-CM

## 2024-08-26 DIAGNOSIS — G62.9 NEUROPATHY: ICD-10-CM

## 2024-08-26 DIAGNOSIS — E11.9 TYPE 2 DIABETES MELLITUS WITHOUT COMPLICATION, WITHOUT LONG-TERM CURRENT USE OF INSULIN (HCC): ICD-10-CM

## 2024-08-26 PROBLEM — F32.1 CURRENT MODERATE EPISODE OF MAJOR DEPRESSIVE DISORDER (HCC): Status: RESOLVED | Noted: 2021-05-25 | Resolved: 2024-08-26

## 2024-08-26 PROBLEM — F33.1 MODERATE EPISODE OF RECURRENT MAJOR DEPRESSIVE DISORDER (HCC): Status: RESOLVED | Noted: 2023-02-17 | Resolved: 2024-08-26

## 2024-08-26 PROCEDURE — 99204 OFFICE O/P NEW MOD 45 MIN: CPT | Performed by: NURSE PRACTITIONER

## 2024-08-26 RX ORDER — EMPAGLIFLOZIN AND METFORMIN HYDROCHLORIDE 5; 500 MG/1; MG/1
2 TABLET ORAL 2 TIMES DAILY
Qty: 400 TABLET | Refills: 0 | Status: SHIPPED | OUTPATIENT
Start: 2024-08-26 | End: 2024-12-04

## 2024-08-26 RX ORDER — ALBUTEROL SULFATE 90 UG/1
2 AEROSOL, METERED RESPIRATORY (INHALATION) 4 TIMES DAILY
Qty: 18 G | Refills: 1 | Status: SHIPPED | OUTPATIENT
Start: 2024-08-26 | End: 2024-09-25

## 2024-08-26 RX ORDER — GABAPENTIN 100 MG/1
100 CAPSULE ORAL
Qty: 100 CAPSULE | Refills: 0 | Status: SHIPPED | OUTPATIENT
Start: 2024-08-26 | End: 2024-12-04

## 2024-08-26 RX ORDER — KETOROLAC TROMETHAMINE 30 MG/ML
1 INJECTION, SOLUTION INTRAMUSCULAR; INTRAVENOUS ONCE
Qty: 1 EACH | Refills: 0 | Status: SHIPPED | OUTPATIENT
Start: 2024-08-26 | End: 2024-08-26

## 2024-08-26 RX ORDER — ATORVASTATIN CALCIUM 40 MG/1
40 TABLET, FILM COATED ORAL DAILY
Qty: 100 TABLET | Refills: 0 | Status: SHIPPED | OUTPATIENT
Start: 2024-08-26 | End: 2024-12-04

## 2024-08-26 RX ORDER — VALSARTAN AND HYDROCHLOROTHIAZIDE 160; 25 MG/1; MG/1
1 TABLET ORAL DAILY
Qty: 100 TABLET | Refills: 1 | Status: SHIPPED | OUTPATIENT
Start: 2024-08-26 | End: 2024-12-04

## 2024-08-26 RX ORDER — GABAPENTIN 100 MG/1
100 CAPSULE ORAL
COMMUNITY
Start: 2024-07-29 | End: 2024-08-26 | Stop reason: SDUPTHER

## 2024-08-26 RX ORDER — POTASSIUM CHLORIDE 750 MG/1
20 TABLET, EXTENDED RELEASE ORAL DAILY
Qty: 200 TABLET | Refills: 0 | Status: SHIPPED | OUTPATIENT
Start: 2024-08-26 | End: 2024-12-04

## 2024-08-26 RX ORDER — POTASSIUM CHLORIDE 1500 MG/1
20 TABLET, EXTENDED RELEASE ORAL DAILY
Qty: 100 TABLET | Refills: 0 | Status: SHIPPED | OUTPATIENT
Start: 2024-08-26 | End: 2024-08-26 | Stop reason: SDUPTHER

## 2024-08-26 RX ORDER — ASPIRIN 81 MG/1
81 TABLET, CHEWABLE ORAL DAILY
Qty: 100 TABLET | Refills: 0 | Status: SHIPPED | OUTPATIENT
Start: 2024-08-26 | End: 2024-12-04

## 2024-08-26 RX ORDER — EMPAGLIFLOZIN AND METFORMIN HYDROCHLORIDE 5; 500 MG/1; MG/1
1 TABLET ORAL 2 TIMES DAILY
COMMUNITY
Start: 2024-06-30 | End: 2024-08-26

## 2024-08-26 RX ORDER — GABAPENTIN 300 MG/1
300 CAPSULE ORAL
Qty: 30 CAPSULE | Refills: 3 | Status: SHIPPED | OUTPATIENT
Start: 2024-08-26

## 2024-08-26 RX ORDER — BLOOD-GLUCOSE SENSOR
1 EACH MISCELLANEOUS
Qty: 6 EACH | Refills: 3 | Status: SHIPPED | OUTPATIENT
Start: 2024-08-26

## 2024-08-26 RX ORDER — LORATADINE 10 MG/1
10 TABLET ORAL DAILY
Qty: 100 TABLET | Refills: 0 | Status: SHIPPED | OUTPATIENT
Start: 2024-08-26 | End: 2024-12-04

## 2024-08-26 NOTE — PROGRESS NOTES
Ambulatory Visit  Name: Theresa Bennett      : 1955      MRN: [id number]  Encounter Provider: JOCELIN Toscano  Encounter Date: 2024   Encounter department: [facility]    Pt is a 69 yr old female   Presents in office to establish care   Follow up on multiple underlying issues   Assessment & Plan   1. Benign essential hypertension  Assessment & Plan:  Stable   HTN assessed for stability and discussed plan of care   CrCl cannot be calculated (Patient's most recent lab result is older than the maximum 7 days allowed.).   Reviewed labs   .  No changes   We will continue to monitor    2. Hypokalemia  Comments:  was started on daily potassim  discussed s/s of hypo and hyperkalemia  Orders:  -     potassium chloride (Klor-Con M10) 10 mEq tablet; Take 2 tablets (20 mEq total) by mouth daily  3. Hypertension goal BP (blood pressure) < 130/80  -     valsartan-hydrochlorothiazide (DIOVAN-HCT) 160-25 MG per tablet; Take 1 tablet by mouth daily  4. Small right pontine stroke with residual dysesthesias  Comments:  sent to physical therapy  Assessment & Plan:  Will need follow up with neurology     Orders:  -     Ambulatory Referral to Neurology; Future  -     Ambulatory Referral to Physical Therapy; Future  -     CTA head and neck w wo contrast; Future; Expected date: 2024  5. DM type 2, not at goal (HCC)  -     Synjardy 5-500 MG TABS; Take 2 tablets by mouth 2 (two) times a day  -     semaglutide, 1 mg/dose, (Ozempic) 4 mg/3 mL injection pen; Inject 0.75 mL (1 mg total) under the skin every 7 days  -     gabapentin (NEURONTIN) 100 mg capsule; Take 1 capsule (100 mg total) by mouth daily with breakfast  6. Type 2 diabetes mellitus without complication, without long-term current use of insulin (HCC)  Comments:  made medication adjsutemnts   discussed   nutritionist follow up discussed  Assessment & Plan:  Improved   Will continue to monitor   Diabetes assessed for stability  and discussed plan of care   Reviewed medications and changes as needed   Reviewed labs and medications   Diet and adequate hydration reviewed   We will continue 4 month follow up surveillance  Reviewed Podiatry follow up   Reviewed  DM eye exam    Lab Results   Component Value Date    HGBA1C 8.4 (H) 08/22/2024     Orders:  -     Continuous Glucose  (FreeStyle Benjam[place] 3 [place]) NEL; Use 1 each 1 (one) time for 1 dose  -     Continuous Glucose Sensor (FreeStyle Benjamín 3 Sensor) MISC; Use 1 each every 14 (fourteen) days  -     Ambulatory Referral to Nutrition Services; Future  7. Long term current use of insulin (HCC)  8. Mixed hyperlipidemia  Assessment & Plan:  Hyperlipidemia diagnoses assessed and discussed   Reviewed medications and plan of care   Labs reviewed   08/22/2024   Discussed low fat low cholesterol diet   Discussed exercise and adequate hydration   Will continue to monitor every 4 months     Orders:  -     atorvastatin (LIPITOR) 40 mg tablet; Take 1 tablet (40 mg total) by mouth daily  9. Other fatigue  -     CTA head and neck w wo contrast; Future; Expected date: 08/26/2024  10. Seasonal allergies  -     loratadine (CLARITIN) 10 mg tablet; Take 1 tablet (10 mg total) by mouth daily  -     albuterol (PROVENTIL HFA,VENTOLIN HFA) 90 mcg/act inhaler; Inhale 2 puffs 4 (four) times a day As needed  11. History of lacunar cerebrovascular accident (CVA)  -     gabapentin (Neurontin) 300 mg capsule; Take 1 capsule (300 mg total) by mouth daily at bedtime  -     CTA head and neck w wo contrast; Future; Expected date: 08/26/2024  12. Carotid stenosis, bilateral  -     aspirin 81 mg chewable tablet; Chew 1 tablet (81 mg total) daily Please buy over the counter  13. Leg pain, anterior, left  -     VAS ARTERIAL DUPLEX- LOWER LIMB BILATERAL; Future; Expected date: 08/26/2024  -     CTA head and neck w wo contrast; Future; Expected date: 08/26/2024  14. Balance problem  -     CTA head and neck w wo contrast;  Future; Expected date: 08/26/2024  15. Increased weakness when ambulating  -     CTA head and neck w wo contrast; Future; Expected date: 08/26/2024  16. Neuropathy  Comments:  on gabapentin      Depression Screening and Follow-up Plan: Patient was screened for depression during today's encounter. They screened negative with a PHQ-9 score of 3.    Falls Plan of Care: balance, strength, and gait training instructions were provided and referral to physical therapy. Recommended assistive device to help with gait and balance. Medications that increase falls were reviewed. Assessed feet and footwear. Vitamin D supplementation was recommended. Home safety education provided.       History of Present Illness     Pt is a 69 yr old female   Presents in office to establish care   Follow up on multiple underlying issues   Diabetes - not at goal   History of stroke with balance issues and left sided weakness   HTN - at goal           Review of Systems   Constitutional:  Positive for fatigue. Negative for fever and unexpected weight change.   HENT:  Negative for congestion, nosebleeds and sinus pressure.    Eyes: Negative.    Respiratory:  Negative for cough and shortness of breath.    Cardiovascular:  Negative for chest pain and palpitations.        HTN    Gastrointestinal:  Negative for abdominal distention, abdominal pain, nausea and vomiting.   Endocrine:        Diabetes    Genitourinary:  Negative for difficulty urinating and flank pain.   Musculoskeletal:  Positive for arthralgias, gait problem and myalgias.        Gait abnormality  Balance issues and weakness     Skin:  Negative for rash.   Allergic/Immunologic: Negative for environmental allergies.   Neurological:  Positive for weakness and light-headedness.   Hematological:  Negative for adenopathy.   Psychiatric/Behavioral:  Negative for sleep disturbance and suicidal ideas. The patient is nervous/anxious.      Pertinent Medical History   Reviewed       Medical History  "Reviewed by provider this encounter:  Tobacco  Allergies  Meds  Problems  Med Hx  Surg Hx  Fam Hx       Past Medical History   Past Medical History:   Diagnosis Date    Diabetes mellitus (HCC)     Hyperlipidemia     Hypertension      Past Surgical History:   Procedure Laterality Date     SECTION      HYSTERECTOMY      TONSILLECTOMY       History reviewed. No pertinent family history.  Current Outpatient Medications on File Prior to Visit   Medication Sig Dispense Refill    lifitegrast (Xiidra) 5 % op solution Administer 1 drop to both eyes 2 (two) times a day PRN      Restasis 0.05 % ophthalmic emulsion Administer 1 drop to both eyes daily as needed      Diclofenac Sodium (VOLTAREN) 1 % Apply 2 g topically 4 (four) times a day For pain to affected area 100 g 0     No current facility-administered medications on file prior to visit.   No Known Allergies   Current Outpatient Medications on File Prior to Visit   Medication Sig Dispense Refill    lifitegrast (Xiidra) 5 % op solution Administer 1 drop to both eyes 2 (two) times a day PRN      Restasis 0.05 % ophthalmic emulsion Administer 1 drop to both eyes daily as needed      Diclofenac Sodium (VOLTAREN) 1 % Apply 2 g topically 4 (four) times a day For pain to affected area 100 g 0     No current facility-administered medications on file prior to visit.      Social History     Tobacco Use    Smoking status: Never    Smokeless tobacco: Never   Vaping Use    Vaping status: Never Used   Substance and Sexual Activity    Alcohol use: Not Currently    Drug use: Never    Sexual activity: Never     Objective     /60 (BP Location: Left arm, Patient Position: Sitting, Cuff Size: Large)   Pulse 88   Temp 98.9 °F (37.2 °C)   Ht 5' 1\" (1.549 m)   Wt 82.6 kg (182 lb)   SpO2 93%   BMI 34.39 kg/m²     Physical Exam  Vitals and nursing note reviewed.   Constitutional:       Comments: BMI 34.39   HENT:      Head: Normocephalic and atraumatic.   Eyes:      " Extraocular Movements: Extraocular movements intact.   Cardiovascular:      Rate and Rhythm: Normal rate and regular rhythm.      Pulses: Normal pulses.      Heart sounds: Normal heart sounds.   Pulmonary:      Effort: Pulmonary effort is normal.      Breath sounds: Normal breath sounds.   Abdominal:      Palpations: Abdomen is soft.   Musculoskeletal:      Cervical back: Normal range of motion.   Skin:     Capillary Refill: Capillary refill takes less than 2 seconds.   Neurological:      Mental Status: She is alert and oriented to person, place, and time.      Motor: Weakness (slight left sided) present.      Coordination: Coordination abnormal.      Gait: Gait abnormal.   Psychiatric:         Mood and Affect: Mood normal.         Behavior: Behavior normal.         Basic metabolic panel  Order: [id number]   Status: Final result       Visible to patient: Yes (not seen)       Next appt: 09/04/2024 at 11:20 AM in Family Medicine (JOCELIN Toscano)       Dx: Type 2 diabetes mellitus without comp...    3 Result Notes       1 Patient Communication       1  Topic            Component  Ref Range & Units 8/22/24 11:18 AM 12/9/23  4:49 AM 12/8/23 10:06 AM 2/13/23  8:22 AM 7/11/22  8:44 AM 2/12/22  9:06 AM 5/3/21  9:16 AM   Sodium  135 - 147 mmol/L 141 137 139 139 R 140 R 139 R 141 R   Potassium  3.5 - 5.3 mmol/L 3.2 Low  3.7 4.3 4.0 R 3.3 Low  R 3.6 R 4.4 R   Chloride  96 - 108 mmol/L 99 105 104 98 R 99 R 100 R 104 R   CO2  21 - 32 mmol/L 32 24 26 28 R 27 R 26 R 24 R   ANION GAP  4 - 13 mmol/L 10 8 R 9 R 13 R 14 R 13 R 13 R   BUN  5 - 25 mg/dL 11 16 11 9 R 14 R 15 R 12 R   Creatinine  0.60 - 1.30 mg/dL 0.80 0.78 CM 0.83 CM 0.9 R 0.7 R 0.8 R 0.9 R   Comment: Standardized to IDMS reference method   Glucose, Fasting  65 - 99 mg/dL 148 High  164 High         Calcium  8.4 - 10.2 mg/dL 9.5 8.9 9.4 9.9 9.7 8.9 9.9   eGFR  ml/min/1.73sq m 75 78 72 72 R, CM          Contains abnormal data Hemoglobin A1C  Order: [id number]    Status: Final result       Visible to patient: Yes (not seen)       Next appt: 09/04/2024 at 11:20 AM in Family Medicine (JOCELIN Tocsano)       Dx: Type 2 diabetes mellitus without comp...    3 Result Notes       1 Patient Communication       1 HM Topic            Component  Ref Range & Units 8/22/24 11:18 AM 12/9/23  4:49 AM 2/13/23  8:22 AM 7/11/22  8:44 AM 2/12/22  9:06 AM 5/3/21  9:16 AM 10/8/20  7:24 AM   Hemoglobin A1C  Normal 4.0-5.6%; PreDiabetic 5.7-6.4%; Diabetic >=6.5%; Glycemic control for adults with diabetes <7.0% % 8.4 High  8.2 High  7.2 High  R, CM 8.8 High  R, CM 10.2 High  R, CM 7.6 High  R, CM 8.5 High  R, CM   EAG  mg/dl 194 189 160 High  R 206 High  R 246 High  R 171 High  R 197 High  R              Specimen Collected: 08/22/24 11:18 AM          Contains abnormal data Lipid panel  Order: [id number]   Status: Final result       Visible to patient: Yes (not seen)       Next appt: 09/04/2024 at 11:20 AM in Family Medicine (JOCELIN Toscano)       Dx: Mixed hyperlipidemia    3 Result Notes       1 Patient Communication       Component  Ref Range & Units 8/22/24 11:18 AM 12/9/23  4:49 AM   Cholesterol  See Comment mg/dL 166 225 High  CM   Comment: Cholesterol:        Pediatric <18 Years        Desirable          <170 mg/dL      Borderline High    170-199 mg/dL      High               >=200 mg/dL        Adult >=18 Years           Desirable         <200 mg/dL      Borderline High   200-239 mg/dL      High             >239 mg/dL   Triglycerides  See Comment mg/dL 89 165 High  CM   Comment: Triglyceride:     0-9Y            <75mg/dL     10Y-17Y         <90 mg/dL       >=18Y     Normal          <150 mg/dL     Borderline High 150-199 mg/dL     High            200-499 mg/dL       Very High       >499 mg/dL    Specimen collection should occur prior to Metamizole administration due to the potential for falsely depressed results.   HDL, Direct  >=50 mg/dL 39 Low  40 Low    LDL Calculated  0 - 100  mg/dL 109 High  152 High  CM   Comment: LDL Cholesterol:    Optimal           <100 mg/dl    Near Optimal      100-129 mg/dl    Above Optimal      Borderline High 130-159 mg/dl      High            160-189 mg/dl      Very High       >189 mg/dl         This screening LDL is a calculated result.  It does not have the accuracy of the Direct Measured LDL in the monitoring of patients with hyperlipidemia and/or statin therapy.  Direct Measure LDL (POT898) must be ordered separately in these patients.   Non-HDL-Chol (CHOL-HDL)  mg/dl 127               Specimen Collected: 08/22/24 11:18 AM         CBC and differential  Order: [id number]   Status: Final result       Visible to patient: Yes (not seen)       Next appt: 09/04/2024 at 11:20 AM in Family Medicine (JOCELIN Toscano)       Dx: Other fatigue    3 Result Notes       1 Patient Communication        Component  Ref Range & Units 8/22/24 11:18 AM 12/9/23  4:49 AM 12/8/23 10:06 AM   WBC  4.31 - 10.16 Thousand/uL 7.07 7.03 6.82   RBC  3.81 - 5.12 Million/uL 4.98 4.83 5.52 High    Hemoglobin  11.5 - 15.4 g/dL 12.9 12.4 14.0   Hematocrit  34.8 - 46.1 % 43.8 39.9 45.8   MCV  82 - 98 fL 88 83 83   MCH  26.8 - 34.3 pg 25.9 Low  25.7 Low  25.4 Low    MCHC  31.4 - 37.4 g/dL 29.5 Low  31.1 Low  30.6 Low    RDW  11.6 - 15.1 % 14.7 14.1 14.1   MPV  8.9 - 12.7 fL 10.1 9.7 9.8   Platelets  149 - 390 Thousands/uL 323 297 353   nRBC  /100 WBCs 0 0    Segmented %  43 - 75 % 58 55    Immature Grans %  0 - 2 % 0 0    Lymphocytes %  14 - 44 % 31 34    Monocytes %  4 - 12 % 8 7    Eosinophils Relative  0 - 6 % 2 3    Basophils Relative  0 - 1 % 1 1    Absolute Neutrophils  1.85 - 7.62 Thousands/µL 4.07 3.85    Absolute Immature Grans  0.00 - 0.20 Thousand/uL 0.02 0.02    Absolute Lymphocytes  0.60 - 4.47 Thousands/µL 2.20 2.39    Absolute Monocytes  0.17 - 1.22 Thousand/µL 0.58 0.51    Eosinophils Absolute  0.00 - 0.61 Thousand/µL 0.14 0.19    Basophils Absolute  0.00 - 0.10 Thousands/µL  0.06 0.07               Specimen Collected: 08/22/24 11:1         Albumin / creatinine urine ratio  Order: [id number]   Status: Final result       Visible to patient: Yes (not seen)       Next appt: 09/04/2024 at 11:20 AM in Family Medicine (JOCELIN Toscano)       Dx: Type 2 diabetes mellitus without comp...    3 Result Notes       1 Patient Communication       1  Topic          Component  Ref Range & Units 8/22/24 11:18 AM 2/13/23  8:22 AM 7/11/22  8:44 AM 2/12/22  9:06 AM 5/3/21  9:16 AM   Creatinine, Ur  Reference range not established. mg/dL 68.0 59 R 84 R 55 R 20 R   Albumin,U,Random  <20.0 mg/L <7.0 <1.20 R <1.20 R 2.37 R <1.20 R   Albumin Creat Ratio  <20 R <14 R 43 High  R Uninterpretable Albumin/Creatinine ratio due to very low albumin and creatinine values. R   Comment: Unable to calculate.                Specimen Collected: 08/22/24 11:18 AM           Administrative Statements   I have spent a total time of 45  minutes in caring for this patient on the day of the visit/encounter including Diagnostic results, Prognosis, Risks and benefits of tx options, Instructions for management, Patient and family education, Importance of tx compliance, Risk factor reductions, Impressions, Counseling / Coordination of care, Documenting in the medical record, Reviewing / ordering tests, medicine, procedures  , Obtaining or reviewing history  , and Communicating with other healthcare professionals .

## 2024-08-27 ENCOUNTER — HOSPITAL ENCOUNTER (OUTPATIENT)
Dept: VASCULAR ULTRASOUND | Facility: HOSPITAL | Age: 69
Discharge: HOME | End: 2024-08-27
Payer: COMMERCIAL

## 2024-08-27 DIAGNOSIS — E78.2 MIXED HYPERLIPIDEMIA: ICD-10-CM

## 2024-08-27 DIAGNOSIS — I65.21 ATHEROSCLEROSIS OF RIGHT CAROTID ARTERY: ICD-10-CM

## 2024-08-27 PROCEDURE — 93880 EXTRACRANIAL BILAT STUDY: CPT | Performed by: SURGERY

## 2024-08-27 PROCEDURE — 93880 EXTRACRANIAL BILAT STUDY: CPT

## 2024-08-28 NOTE — RESULT ENCOUNTER NOTE
Your carotid artery ultrasound came back showing only mild stenosis bilaterally.  We can continue to monitor this with repeat imaging in approximately 2 years.  Please let me know if you have any additional questions or concerns.     Ros

## 2024-09-01 NOTE — ASSESSMENT & PLAN NOTE
Stable   HTN assessed for stability and discussed plan of care   CrCl cannot be calculated (Patient's most recent lab result is older than the maximum 7 days allowed.).   Reviewed labs   .  No changes   We will continue to monitor

## 2024-09-01 NOTE — PATIENT INSTRUCTIONS
"Patient Education     Type 2 diabetes   The Basics   Written by the doctors and editors at [facility]   What is type 2 diabetes? -- This is a disorder that disrupts the way the body uses sugar. It is sometimes called type 2 diabetes mellitus.  All of the cells in the body need sugar to work normally. Sugar gets into the cells with the help of a hormone called insulin. Insulin is made by the pancreas, an organ in the belly. If there is not enough insulin, or if cells in the body don't respond normally to insulin, sugar builds up in the blood. That is what happens to people with diabetes.  There are 2 different types of diabetes:   In type 1 diabetes, the pancreas makes little or no insulin.   In type 2 diabetes, the pancreas still makes some insulin, but the cells in the body stop responding normally. Eventually, the pancreas cannot make enough insulin to keep up.  Having excess body weight or obesity increases a person's risk of developing type 2 diabetes. But people without excess body weight can get diabetes, too.  What are the symptoms of type 2 diabetes? -- Type 2 diabetes usually causes no symptoms. When symptoms do happen, they include:   Needing to urinate often   Intense thirst   Blurry vision  Can diabetes lead to other health problems? -- Yes. Type 2 diabetes might not make you feel sick. But if it is not managed, it can lead to serious problems over time, such as:   Heart attacks   Strokes   Kidney disease   Vision problems (or even blindness)   Pain or loss of feeling in the hands and feet   Needing to have fingers, toes, or other body parts removed (amputated)  How do I know if I have type 2 diabetes? -- Your doctor or nurse can do a blood test. There are 2 tests that can be used for this. Both involve measuring the amount of sugar in your blood, called your \"blood sugar\" or \"blood glucose\":   One of the tests measures your blood sugar at the time the blood sample is taken. This test is done in the " "morning. You can't eat or drink anything except water for at least 8 hours before the test.   The other test shows what your average blood sugar has been for the past 2 to 3 months. This blood test is called \"hemoglobin A1C\" or just \"A1C.\" It can be checked at any time of the day, even if you have recently eaten.  How is type 2 diabetes treated? -- The goals of treatment are to manage your blood sugar and lower the risk of future problems that can happen in people with diabetes.  Treatment might include:   Lifestyle changes - This is an important part of managing diabetes. It includes eating healthy foods and getting plenty of physical activity.   Medicines - There are a few medicines that help lower blood sugar. Some people need to take pills that help the body make more insulin or that help insulin do its job. Others need insulin shots.  Depending on what medicines you take, you might need to check your blood sugar regularly at home. But not everyone with type 2 diabetes needs to do this. Your doctor or nurse will tell you if you should be checking your blood sugar, and when and how to do this.  Sometimes, people with type 2 diabetes also need medicines to help prevent problems caused by the disease. For instance, medicines used to lower blood pressure can reduce the chances of a heart attack or stroke.   General medical care - It's also important to take care of other areas of your health. This includes watching your blood pressure and cholesterol levels. You should also get certain vaccines, such as vaccines to protect against the flu and coronavirus disease 2019 (\"COVID-19\"). Some people also need a vaccine to prevent pneumonia.  Can type 2 diabetes be prevented? -- Yes. To lower your chances of getting type 2 diabetes, the most important thing you can do is eat a healthy diet and get plenty of physical activity. This can help you lose weight if you are overweight. But eating well and being active are also good " for your overall health. Even gentle activity, like walking, has benefits.  If you smoke, quitting can also lower your risk of type 2 diabetes. Quitting smoking can be difficult, but your doctor or nurse can help.  All topics are updated as new evidence becomes available and our peer review process is complete.  This topic retrieved from [employer] on: Apr 24, 2024.  Topic 05136 Version 23.0  Release: 32.3.2 - C32.113  © 2024 UpToDate, Inc. and/or its affiliates. All rights reserved.  Consumer Information Use and Disclaimer   Disclaimer: This generalized information is a limited summary of diagnosis, treatment, and/or medication information. It is not meant to be comprehensive and should be used as a tool to help the user understand and/or assess potential diagnostic and treatment options. It does NOT include all information about conditions, treatments, medications, side effects, or risks that may apply to a specific patient. It is not intended to be medical advice or a substitute for the medical advice, diagnosis, or treatment of a health care provider based on the health care provider's examination and assessment of a patient's specific and unique circumstances. Patients must speak with a health care provider for complete information about their health, medical questions, and treatment options, including any risks or benefits regarding use of medications. This information does not endorse any treatments or medications as safe, effective, or approved for treating a specific patient. UpToDate, Inc. and its affiliates disclaim any warranty or liability relating to this information or the use thereof.The use of this information is governed by the Terms of Use, available at https://www.wolterskluwer.com/en/know/clinical-effectiveness-terms. 2024© UpToDate, Inc. and its affiliates and/or licensors. All rights reserved.  Copyright   © 2024 Up[employer]Date, Inc. and/or its affiliates. All rights reserved.

## 2024-09-01 NOTE — ASSESSMENT & PLAN NOTE
Hyperlipidemia diagnoses assessed and discussed   Reviewed medications and plan of care   Labs reviewed   08/22/2024   Discussed low fat low cholesterol diet   Discussed exercise and adequate hydration   Will continue to monitor every 4 months

## 2024-09-01 NOTE — ASSESSMENT & PLAN NOTE
Improved   Will continue to monitor   Diabetes assessed for stability and discussed plan of care   Reviewed medications and changes as needed   Reviewed labs and medications   Diet and adequate hydration reviewed   We will continue 4 month follow up surveillance  Reviewed Podiatry follow up   Reviewed  DM eye exam    Lab Results   Component Value Date    HGBA1C 8.4 (H) 08/22/2024

## 2024-09-03 NOTE — RESULT ENCOUNTER NOTE
You have less than 50% stenosis in both carotid arteries which is only mild stenosis.  Continue aspirin and statin

## 2024-09-04 ENCOUNTER — OFFICE VISIT (OUTPATIENT)
Dept: FAMILY MEDICINE CLINIC | Facility: CLINIC | Age: 69
End: 2024-09-04
Payer: COMMERCIAL

## 2024-09-04 VITALS
TEMPERATURE: 97.3 F | SYSTOLIC BLOOD PRESSURE: 124 MMHG | OXYGEN SATURATION: 94 % | HEIGHT: 61 IN | DIASTOLIC BLOOD PRESSURE: 60 MMHG | WEIGHT: 183 LBS | HEART RATE: 83 BPM | BODY MASS INDEX: 34.55 KG/M2

## 2024-09-04 DIAGNOSIS — M79.604 PAIN IN BOTH LOWER EXTREMITIES: ICD-10-CM

## 2024-09-04 DIAGNOSIS — E87.6 HYPOKALEMIA: Primary | ICD-10-CM

## 2024-09-04 DIAGNOSIS — E78.2 MIXED HYPERLIPIDEMIA: ICD-10-CM

## 2024-09-04 DIAGNOSIS — Z86.73 HISTORY OF LACUNAR CEREBROVASCULAR ACCIDENT (CVA): ICD-10-CM

## 2024-09-04 DIAGNOSIS — E11.9 DM TYPE 2, NOT AT GOAL (HCC): ICD-10-CM

## 2024-09-04 DIAGNOSIS — M79.605 PAIN IN BOTH LOWER EXTREMITIES: ICD-10-CM

## 2024-09-04 DIAGNOSIS — I10 HYPERTENSION GOAL BP (BLOOD PRESSURE) < 130/80: ICD-10-CM

## 2024-09-04 DIAGNOSIS — E87.6 HYPOKALEMIA: ICD-10-CM

## 2024-09-04 DIAGNOSIS — I65.23 CAROTID STENOSIS, BILATERAL: ICD-10-CM

## 2024-09-04 DIAGNOSIS — I10 BENIGN ESSENTIAL HYPERTENSION: ICD-10-CM

## 2024-09-04 PROCEDURE — 99214 OFFICE O/P EST MOD 30 MIN: CPT | Performed by: NURSE PRACTITIONER

## 2024-09-04 PROCEDURE — G2211 COMPLEX E/M VISIT ADD ON: HCPCS | Performed by: NURSE PRACTITIONER

## 2024-09-15 RX ORDER — GABAPENTIN 300 MG/1
300 CAPSULE ORAL
Qty: 30 CAPSULE | Refills: 3 | Status: SHIPPED | OUTPATIENT
Start: 2024-09-15

## 2024-09-15 RX ORDER — POTASSIUM CHLORIDE 750 MG/1
20 TABLET, EXTENDED RELEASE ORAL DAILY
Qty: 200 TABLET | Refills: 0 | Status: SHIPPED | OUTPATIENT
Start: 2024-09-15 | End: 2024-12-24

## 2024-09-15 RX ORDER — ATORVASTATIN CALCIUM 40 MG/1
40 TABLET, FILM COATED ORAL DAILY
Qty: 100 TABLET | Refills: 0 | Status: SHIPPED | OUTPATIENT
Start: 2024-09-15 | End: 2024-12-24

## 2024-09-15 RX ORDER — ASPIRIN 81 MG/1
81 TABLET, CHEWABLE ORAL DAILY
Qty: 100 TABLET | Refills: 0 | Status: SHIPPED | OUTPATIENT
Start: 2024-09-15 | End: 2024-12-24

## 2024-09-15 RX ORDER — GABAPENTIN 100 MG/1
100 CAPSULE ORAL
Qty: 100 CAPSULE | Refills: 0 | Status: SHIPPED | OUTPATIENT
Start: 2024-09-15 | End: 2024-12-24

## 2024-09-15 RX ORDER — VALSARTAN AND HYDROCHLOROTHIAZIDE 160; 25 MG/1; MG/1
1 TABLET ORAL DAILY
Qty: 100 TABLET | Refills: 0 | Status: SHIPPED | OUTPATIENT
Start: 2024-09-15 | End: 2024-12-24

## 2024-09-15 RX ORDER — EMPAGLIFLOZIN AND METFORMIN HYDROCHLORIDE 5; 500 MG/1; MG/1
2 TABLET ORAL 2 TIMES DAILY
Qty: 400 TABLET | Refills: 0 | Status: SHIPPED | OUTPATIENT
Start: 2024-09-15 | End: 2024-12-24

## 2024-09-15 NOTE — PROGRESS NOTES
Ambulatory Visit  Name: Theresa Bennett      : 1955      MRN: [id number]  Encounter Provider: JOCELIN Toscano  Encounter Date: 2024   Encounter department: [facility]    Pt is a 69 yr old female   Presents in office to establish care   Follow up on multiple underlying issues   Supplemented potassium will follow up with electrolyte   Lower extremity pain improved with gabapentin    HTN stable   Left sided weakness has not started with PT yet encouraged to do so     Assessment & Plan   1. Hypokalemia  -     Electrolyte panel; Future  -     potassium chloride (Klor-Con M10) 10 mEq tablet; Take 2 tablets (20 mEq total) by mouth daily  2. Benign essential hypertension  Comments:  stable doing ok  3. Pain in both lower extremities  Comments:  improved  4. Mixed hyperlipidemia  -     atorvastatin (LIPITOR) 40 mg tablet; Take 1 tablet (40 mg total) by mouth daily  5. Hypokalemia  Comments:  was started on daily potassim  discussed s/s of hypo and hyperkalemia  Orders:  -     Electrolyte panel; Future  -     potassium chloride (Klor-Con M10) 10 mEq tablet; Take 2 tablets (20 mEq total) by mouth daily  6. Hypertension goal BP (blood pressure) < 130/80  -     valsartan-hydrochlorothiazide (DIOVAN-HCT) 160-25 MG per tablet; Take 1 tablet by mouth daily  7. DM type 2, not at goal (HCC)  Comments:  not at goal  Orders:  -     Synjardy 5-500 MG TABS; Take 2 tablets by mouth 2 (two) times a day  -     gabapentin (NEURONTIN) 100 mg capsule; Take 1 capsule (100 mg total) by mouth daily with breakfast  -     Ambulatory referral to Diabetic Education - use to refer for diabetes group classes, individual diabetes education, medical nutrition therapy, device training; Future; Expected date: 09/15/2024  8. History of lacunar cerebrovascular accident (CVA)  -     gabapentin (Neurontin) 300 mg capsule; Take 1 capsule (300 mg total) by mouth daily at bedtime  9. Carotid stenosis, bilateral  -      aspirin 81 mg chewable tablet; Chew 1 tablet (81 mg total) daily Please buy over the counter      Falls Plan of Care: balance, strength, and gait training instructions were provided and referral to physical therapy. Recommended assistive device to help with gait and balance. Medications that increase falls were reviewed. Assessed feet and footwear. Vitamin D supplementation was recommended. Home safety education provided.       History of Present Illness     Pt is a 69 yr old female   Presents in office for follow up and lab review   Diabetes - not at goal does see ENDO will continue   History of stroke with balance issues and left sided weakness -encouraged PT follow up   HTN - at goal   Feeling better over all and pain much better controlled           Review of Systems   Constitutional:  Positive for fatigue. Negative for fever and unexpected weight change.   HENT:  Negative for congestion, nosebleeds and sinus pressure.    Eyes: Negative.    Respiratory:  Negative for cough and shortness of breath.    Cardiovascular:  Negative for chest pain and palpitations.        HTN    Gastrointestinal:  Negative for abdominal distention, abdominal pain, nausea and vomiting.   Endocrine:        Diabetes    Genitourinary:  Negative for difficulty urinating and flank pain.   Musculoskeletal:  Positive for arthralgias, gait problem and myalgias.        Gait abnormality  Balance issues and weakness  to left side    Skin:  Negative for rash.   Allergic/Immunologic: Negative for environmental allergies.   Neurological:  Positive for weakness and light-headedness.   Hematological:  Negative for adenopathy.   Psychiatric/Behavioral:  Negative for sleep disturbance and suicidal ideas. The patient is nervous/anxious.      Pertinent Medical History   Reviewed       Medical History Reviewed by provider this encounter:  Tobacco  Allergies  Meds  Problems  Med Hx  Surg Hx  Fam Hx       Past Medical History   Past Medical  History:   Diagnosis Date    Diabetes mellitus (HCC)     Hyperlipidemia     Hypertension      Past Surgical History:   Procedure Laterality Date     SECTION      HYSTERECTOMY      TONSILLECTOMY       History reviewed. No pertinent family history.  Current Outpatient Medications on File Prior to Visit   Medication Sig Dispense Refill    albuterol (PROVENTIL HFA,VENTOLIN HFA) 90 mcg/act inhaler Inhale 2 puffs 4 (four) times a day As needed 18 g 1    Continuous Glucose Sensor (FreeStyle Benjamín 3 Sensor) MISC Use 1 each every 14 (fourteen) days 6 each 3    Diclofenac Sodium (VOLTAREN) 1 % Apply 2 g topically 4 (four) times a day For pain to affected area 100 g 0    lifitegrast (Xiidra) 5 % op solution Administer 1 drop to both eyes 2 (two) times a day PRN      loratadine (CLARITIN) 10 mg tablet Take 1 tablet (10 mg total) by mouth daily 100 tablet 0    Restasis 0.05 % ophthalmic emulsion Administer 1 drop to both eyes daily as needed      semaglutide, 1 mg/dose, (Ozempic) 4 mg/3 mL injection pen Inject 0.75 mL (1 mg total) under the skin every 7 days 12 mL 0    [DISCONTINUED] aspirin 81 mg chewable tablet Chew 1 tablet (81 mg total) daily Please buy over the counter 100 tablet 0    [DISCONTINUED] atorvastatin (LIPITOR) 40 mg tablet Take 1 tablet (40 mg total) by mouth daily 100 tablet 0    [DISCONTINUED] gabapentin (NEURONTIN) 100 mg capsule Take 1 capsule (100 mg total) by mouth daily with breakfast 100 capsule 0    [DISCONTINUED] gabapentin (Neurontin) 300 mg capsule Take 1 capsule (300 mg total) by mouth daily at bedtime 30 capsule 3    [DISCONTINUED] potassium chloride (Klor-Con M10) 10 mEq tablet Take 2 tablets (20 mEq total) by mouth daily 200 tablet 0    [DISCONTINUED] Synjardy 5-500 MG TABS Take 2 tablets by mouth 2 (two) times a day 400 tablet 0    [DISCONTINUED] valsartan-hydrochlorothiazide (DIOVAN-HCT) 160-25 MG per tablet Take 1 tablet by mouth daily 100 tablet 1     No current facility-administered  medications on file prior to visit.   No Known Allergies   Current Outpatient Medications on File Prior to Visit   Medication Sig Dispense Refill    albuterol (PROVENTIL HFA,VENTOLIN HFA) 90 mcg/act inhaler Inhale 2 puffs 4 (four) times a day As needed 18 g 1    Continuous Glucose Sensor (FreeStyle Benjamín 3 Sensor) MISC Use 1 each every 14 (fourteen) days 6 each 3    Diclofenac Sodium (VOLTAREN) 1 % Apply 2 g topically 4 (four) times a day For pain to affected area 100 g 0    lifitegrast (Xiidra) 5 % op solution Administer 1 drop to both eyes 2 (two) times a day PRN      loratadine (CLARITIN) 10 mg tablet Take 1 tablet (10 mg total) by mouth daily 100 tablet 0    Restasis 0.05 % ophthalmic emulsion Administer 1 drop to both eyes daily as needed      semaglutide, 1 mg/dose, (Ozempic) 4 mg/3 mL injection pen Inject 0.75 mL (1 mg total) under the skin every 7 days 12 mL 0    [DISCONTINUED] aspirin 81 mg chewable tablet Chew 1 tablet (81 mg total) daily Please buy over the counter 100 tablet 0    [DISCONTINUED] atorvastatin (LIPITOR) 40 mg tablet Take 1 tablet (40 mg total) by mouth daily 100 tablet 0    [DISCONTINUED] gabapentin (NEURONTIN) 100 mg capsule Take 1 capsule (100 mg total) by mouth daily with breakfast 100 capsule 0    [DISCONTINUED] gabapentin (Neurontin) 300 mg capsule Take 1 capsule (300 mg total) by mouth daily at bedtime 30 capsule 3    [DISCONTINUED] potassium chloride (Klor-Con M10) 10 mEq tablet Take 2 tablets (20 mEq total) by mouth daily 200 tablet 0    [DISCONTINUED] Synjardy 5-500 MG TABS Take 2 tablets by mouth 2 (two) times a day 400 tablet 0    [DISCONTINUED] valsartan-hydrochlorothiazide (DIOVAN-HCT) 160-25 MG per tablet Take 1 tablet by mouth daily 100 tablet 1     No current facility-administered medications on file prior to visit.      Social History     Tobacco Use    Smoking status: Never    Smokeless tobacco: Never   Vaping Use    Vaping status: Never Used   Substance and Sexual  "Activity    Alcohol use: Not Currently    Drug use: Never    Sexual activity: Never     Objective     /60 (BP Location: Left arm, Patient Position: Sitting, Cuff Size: Large)   Pulse 83   Temp (!) 97.3 °F (36.3 °C)   Ht 5' 1\" (1.549 m)   Wt 83 kg (183 lb)   SpO2 94%   BMI 34.58 kg/m²     Physical Exam  Vitals and nursing note reviewed.   Constitutional:       Comments: BMI 34.39   HENT:      Head: Normocephalic and atraumatic.   Eyes:      Extraocular Movements: Extraocular movements intact.   Cardiovascular:      Rate and Rhythm: Normal rate and regular rhythm.      Pulses: Normal pulses.      Heart sounds: Normal heart sounds.   Pulmonary:      Effort: Pulmonary effort is normal.      Breath sounds: Normal breath sounds.   Abdominal:      General: Bowel sounds are normal.      Palpations: Abdomen is soft.   Musculoskeletal:      Cervical back: Normal range of motion.      Right lower leg: Edema (trace) present.      Left lower leg: Edema (trace) present.   Skin:     Capillary Refill: Capillary refill takes less than 2 seconds.   Neurological:      Mental Status: She is alert and oriented to person, place, and time.      Motor: Weakness (slight left sided) present.      Coordination: Coordination abnormal.      Gait: Gait abnormal.   Psychiatric:         Mood and Affect: Mood normal.         Behavior: Behavior normal.         Basic metabolic panel  Order: [id number]   Status: Final result       Visible to patient: Yes (not seen)       Next appt: 09/04/2024 at 11:20 AM in Family Medicine (JOCELIN Toscano)       Dx: Type 2 diabetes mellitus without comp...    3 Result Notes       1 Patient Communication       1  Topic            Component  Ref Range & Units 8/22/24 11:18 AM 12/9/23  4:49 AM 12/8/23 10:06 AM 2/13/23  8:22 AM 7/11/22  8:44 AM 2/12/22  9:06 AM 5/3/21  9:16 AM   Sodium  135 - 147 mmol/L 141 137 139 139 R 140 R 139 R 141 R   Potassium  3.5 - 5.3 mmol/L 3.2 Low  3.7 4.3 4.0 R 3.3 Low  R " 3.6 R 4.4 R   Chloride  96 - 108 mmol/L 99 105 104 98 R 99 R 100 R 104 R   CO2  21 - 32 mmol/L 32 24 26 28 R 27 R 26 R 24 R   ANION GAP  4 - 13 mmol/L 10 8 R 9 R 13 R 14 R 13 R 13 R   BUN  5 - 25 mg/dL 11 16 11 9 R 14 R 15 R 12 R   Creatinine  0.60 - 1.30 mg/dL 0.80 0.78 CM 0.83 CM 0.9 R 0.7 R 0.8 R 0.9 R   Comment: Standardized to IDMS reference method   Glucose, Fasting  65 - 99 mg/dL 148 High  164 High         Calcium  8.4 - 10.2 mg/dL 9.5 8.9 9.4 9.9 9.7 8.9 9.9   eGFR  ml/min/1.73sq m 75 78 72 72 R, CM          Contains abnormal data Hemoglobin A1C  Order: [id number]   Status: Final result       Visible to patient: Yes (not seen)       Next appt: 09/04/2024 at 11:20 AM in Family Medicine (JOCELIN Toscano)       Dx: Type 2 diabetes mellitus without comp...    3 Result Notes       1 Patient Communication       1  Topic            Component  Ref Range & Units 8/22/24 11:18 AM 12/9/23  4:49 AM 2/13/23  8:22 AM 7/11/22  8:44 AM 2/12/22  9:06 AM 5/3/21  9:16 AM 10/8/20  7:24 AM   Hemoglobin A1C  Normal 4.0-5.6%; PreDiabetic 5.7-6.4%; Diabetic >=6.5%; Glycemic control for adults with diabetes <7.0% % 8.4 High  8.2 High  7.2 High  R, CM 8.8 High  R, CM 10.2 High  R, CM 7.6 High  R, CM 8.5 High  R, CM   EAG  mg/dl 194 189 160 High  R 206 High  R 246 High  R 171 High  R 197 High  R              Specimen Collected: 08/22/24 11:18 AM          Contains abnormal data Lipid panel  Order: [id number]   Status: Final result       Visible to patient: Yes (not seen)       Next appt: 09/04/2024 at 11:20 AM in Family Medicine (JOCELIN Toscano)       Dx: Mixed hyperlipidemia    3 Result Notes       1 Patient Communication       Component  Ref Range & Units 8/22/24 11:18 AM 12/9/23  4:49 AM   Cholesterol  See Comment mg/dL 166 225 High  CM   Comment: Cholesterol:        Pediatric <18 Years        Desirable          <170 mg/dL      Borderline High    170-199 mg/dL      High               >=200 mg/dL        Adult >=18  Years           Desirable         <200 mg/dL      Borderline High   200-239 mg/dL      High             >239 mg/dL   Triglycerides  See Comment mg/dL 89 165 High  CM   Comment: Triglyceride:     0-9Y            <75mg/dL     10Y-17Y         <90 mg/dL       >=18Y     Normal          <150 mg/dL     Borderline High 150-199 mg/dL     High            200-499 mg/dL       Very High       >499 mg/dL    Specimen collection should occur prior to Metamizole administration due to the potential for falsely depressed results.   HDL, Direct  >=50 mg/dL 39 Low  40 Low    LDL Calculated  0 - 100 mg/dL 109 High  152 High  CM   Comment: LDL Cholesterol:    Optimal           <100 mg/dl    Near Optimal      100-129 mg/dl    Above Optimal      Borderline High 130-159 mg/dl      High            160-189 mg/dl      Very High       >189 mg/dl         This screening LDL is a calculated result.  It does not have the accuracy of the Direct Measured LDL in the monitoring of patients with hyperlipidemia and/or statin therapy.  Direct Measure LDL (IPW671) must be ordered separately in these patients.   Non-HDL-Chol (CHOL-HDL)  mg/dl 127               Specimen Collected: 08/22/24 11:18 AM         CBC and differential  Order: [id number]   Status: Final result       Visible to patient: Yes (not seen)       Next appt: 09/04/2024 at 11:20 AM in Family Medicine (JOCELIN Toscano)       Dx: Other fatigue    3 Result Notes       1 Patient Communication        Component  Ref Range & Units 8/22/24 11:18 AM 12/9/23  4:49 AM 12/8/23 10:06 AM   WBC  4.31 - 10.16 Thousand/uL 7.07 7.03 6.82   RBC  3.81 - 5.12 Million/uL 4.98 4.83 5.52 High    Hemoglobin  11.5 - 15.4 g/dL 12.9 12.4 14.0   Hematocrit  34.8 - 46.1 % 43.8 39.9 45.8   MCV  82 - 98 fL 88 83 83   MCH  26.8 - 34.3 pg 25.9 Low  25.7 Low  25.4 Low    MCHC  31.4 - 37.4 g/dL 29.5 Low  31.1 Low  30.6 Low    RDW  11.6 - 15.1 % 14.7 14.1 14.1   MPV  8.9 - 12.7 fL 10.1 9.7 9.8   Platelets  149 - 390  Thousands/uL 323 297 353   nRBC  /100 WBCs 0 0    Segmented %  43 - 75 % 58 55    Immature Grans %  0 - 2 % 0 0    Lymphocytes %  14 - 44 % 31 34    Monocytes %  4 - 12 % 8 7    Eosinophils Relative  0 - 6 % 2 3    Basophils Relative  0 - 1 % 1 1    Absolute Neutrophils  1.85 - 7.62 Thousands/µL 4.07 3.85    Absolute Immature Grans  0.00 - 0.20 Thousand/uL 0.02 0.02    Absolute Lymphocytes  0.60 - 4.47 Thousands/µL 2.20 2.39    Absolute Monocytes  0.17 - 1.22 Thousand/µL 0.58 0.51    Eosinophils Absolute  0.00 - 0.61 Thousand/µL 0.14 0.19    Basophils Absolute  0.00 - 0.10 Thousands/µL 0.06 0.07               Specimen Collected: 08/22/24 11:1         Albumin / creatinine urine ratio  Order: [id number]   Status: Final result       Visible to patient: Yes (not seen)       Next appt: 09/04/2024 at 11:20 AM in Family Medicine (JOCELIN Toscano)       Dx: Type 2 diabetes mellitus without comp...    3 Result Notes       1 Patient Communication       1  Topic          Component  Ref Range & Units 8/22/24 11:18 AM 2/13/23  8:22 AM 7/11/22  8:44 AM 2/12/22  9:06 AM 5/3/21  9:16 AM   Creatinine, Ur  Reference range not established. mg/dL 68.0 59 R 84 R 55 R 20 R   Albumin,U,Random  <20.0 mg/L <7.0 <1.20 R <1.20 R 2.37 R <1.20 R   Albumin Creat Ratio  <20 R <14 R 43 High  R Uninterpretable Albumin/Creatinine ratio due to very low albumin and creatinine values. R   Comment: Unable to calculate.                Specimen Collected: 08/22/24 11:18 AM           Administrative Statements   I have spent a total time of 45  minutes in caring for this patient on the day of the visit/encounter including Diagnostic results, Prognosis, Risks and benefits of tx options, Instructions for management, Patient and family education, Importance of tx compliance, Risk factor reductions, Impressions, Counseling / Coordination of care, Documenting in the medical record, Reviewing / ordering tests, medicine, procedures  , Obtaining or reviewing  history  , and Communicating with other healthcare professionals .

## 2024-09-15 NOTE — PATIENT INSTRUCTIONS
"Patient Education     Type 2 diabetes   The Basics   Written by the doctors and editors at [facility]   What is type 2 diabetes? -- This is a disorder that disrupts the way the body uses sugar. It is sometimes called type 2 diabetes mellitus.  All of the cells in the body need sugar to work normally. Sugar gets into the cells with the help of a hormone called insulin. Insulin is made by the pancreas, an organ in the belly. If there is not enough insulin, or if cells in the body don't respond normally to insulin, sugar builds up in the blood. That is what happens to people with diabetes.  There are 2 different types of diabetes:   In type 1 diabetes, the pancreas makes little or no insulin.   In type 2 diabetes, the pancreas still makes some insulin, but the cells in the body stop responding normally. Eventually, the pancreas cannot make enough insulin to keep up.  Having excess body weight or obesity increases a person's risk of developing type 2 diabetes. But people without excess body weight can get diabetes, too.  What are the symptoms of type 2 diabetes? -- Type 2 diabetes usually causes no symptoms. When symptoms do happen, they include:   Needing to urinate often   Intense thirst   Blurry vision  Can diabetes lead to other health problems? -- Yes. Type 2 diabetes might not make you feel sick. But if it is not managed, it can lead to serious problems over time, such as:   Heart attacks   Strokes   Kidney disease   Vision problems (or even blindness)   Pain or loss of feeling in the hands and feet   Needing to have fingers, toes, or other body parts removed (amputated)  How do I know if I have type 2 diabetes? -- Your doctor or nurse can do a blood test. There are 2 tests that can be used for this. Both involve measuring the amount of sugar in your blood, called your \"blood sugar\" or \"blood glucose\":   One of the tests measures your blood sugar at the time the blood sample is taken. This test is done in the " "morning. You can't eat or drink anything except water for at least 8 hours before the test.   The other test shows what your average blood sugar has been for the past 2 to 3 months. This blood test is called \"hemoglobin A1C\" or just \"A1C.\" It can be checked at any time of the day, even if you have recently eaten.  How is type 2 diabetes treated? -- The goals of treatment are to manage your blood sugar and lower the risk of future problems that can happen in people with diabetes.  Treatment might include:   Lifestyle changes - This is an important part of managing diabetes. It includes eating healthy foods and getting plenty of physical activity.   Medicines - There are a few medicines that help lower blood sugar. Some people need to take pills that help the body make more insulin or that help insulin do its job. Others need insulin shots.  Depending on what medicines you take, you might need to check your blood sugar regularly at home. But not everyone with type 2 diabetes needs to do this. Your doctor or nurse will tell you if you should be checking your blood sugar, and when and how to do this.  Sometimes, people with type 2 diabetes also need medicines to help prevent problems caused by the disease. For instance, medicines used to lower blood pressure can reduce the chances of a heart attack or stroke.   General medical care - It's also important to take care of other areas of your health. This includes watching your blood pressure and cholesterol levels. You should also get certain vaccines, such as vaccines to protect against the flu and coronavirus disease 2019 (\"COVID-19\"). Some people also need a vaccine to prevent pneumonia.  Can type 2 diabetes be prevented? -- Yes. To lower your chances of getting type 2 diabetes, the most important thing you can do is eat a healthy diet and get plenty of physical activity. This can help you lose weight if you are overweight. But eating well and being active are also good " for your overall health. Even gentle activity, like walking, has benefits.  If you smoke, quitting can also lower your risk of type 2 diabetes. Quitting smoking can be difficult, but your doctor or nurse can help.  All topics are updated as new evidence becomes available and our peer review process is complete.  This topic retrieved from [employer] on: Apr 24, 2024.  Topic 00963 Version 23.0  Release: 32.3.2 - C32.113  © 2024 UpToDate, Inc. and/or its affiliates. All rights reserved.  Consumer Information Use and Disclaimer   Disclaimer: This generalized information is a limited summary of diagnosis, treatment, and/or medication information. It is not meant to be comprehensive and should be used as a tool to help the user understand and/or assess potential diagnostic and treatment options. It does NOT include all information about conditions, treatments, medications, side effects, or risks that may apply to a specific patient. It is not intended to be medical advice or a substitute for the medical advice, diagnosis, or treatment of a health care provider based on the health care provider's examination and assessment of a patient's specific and unique circumstances. Patients must speak with a health care provider for complete information about their health, medical questions, and treatment options, including any risks or benefits regarding use of medications. This information does not endorse any treatments or medications as safe, effective, or approved for treating a specific patient. UpToDate, Inc. and its affiliates disclaim any warranty or liability relating to this information or the use thereof.The use of this information is governed by the Terms of Use, available at https://www.wolterskluwer.com/en/know/clinical-effectiveness-terms. 2024© UpToDate, Inc. and its affiliates and/or licensors. All rights reserved.  Copyright   © 2024 UpToDate, Inc. and/or its affiliates. All rights reserved.

## 2024-09-17 ENCOUNTER — HOSPITAL ENCOUNTER (OUTPATIENT)
Dept: CT IMAGING | Facility: HOSPITAL | Age: 69
Discharge: HOME | End: 2024-09-17
Payer: COMMERCIAL

## 2024-09-17 DIAGNOSIS — R53.1 INCREASED WEAKNESS WHEN AMBULATING: ICD-10-CM

## 2024-09-17 DIAGNOSIS — Z86.73 HISTORY OF LACUNAR CEREBROVASCULAR ACCIDENT (CVA): ICD-10-CM

## 2024-09-17 DIAGNOSIS — M79.605 LEG PAIN, ANTERIOR, LEFT: ICD-10-CM

## 2024-09-17 DIAGNOSIS — R26.89 BALANCE PROBLEM: ICD-10-CM

## 2024-09-17 DIAGNOSIS — I63.50 RIGHT PONTINE STROKE (HCC): ICD-10-CM

## 2024-09-17 DIAGNOSIS — R53.83 OTHER FATIGUE: ICD-10-CM

## 2024-09-17 PROCEDURE — 70498 CT ANGIOGRAPHY NECK: CPT

## 2024-09-17 PROCEDURE — 70496 CT ANGIOGRAPHY HEAD: CPT

## 2024-09-17 RX ADMIN — IOHEXOL 85 ML: 350 INJECTION, SOLUTION INTRAVENOUS at 06:36

## 2024-09-17 NOTE — RESULT ENCOUNTER NOTE
Internal carotid  stenosis will need monitoring  and due to symptoms she will need to see Neurosurgery and neurology please   Adding the referrals

## 2024-09-20 ENCOUNTER — OFFICE VISIT (OUTPATIENT)
Dept: FAMILY MEDICINE CLINIC | Facility: CLINIC | Age: 69
End: 2024-09-20
Payer: COMMERCIAL

## 2024-09-20 VITALS
TEMPERATURE: 97.5 F | HEIGHT: 61 IN | WEIGHT: 184 LBS | BODY MASS INDEX: 34.74 KG/M2 | HEART RATE: 89 BPM | DIASTOLIC BLOOD PRESSURE: 70 MMHG | OXYGEN SATURATION: 96 % | SYSTOLIC BLOOD PRESSURE: 124 MMHG

## 2024-09-20 DIAGNOSIS — E11.9 DM TYPE 2, NOT AT GOAL (HCC): ICD-10-CM

## 2024-09-20 DIAGNOSIS — R53.1 LEFT-SIDED WEAKNESS: ICD-10-CM

## 2024-09-20 DIAGNOSIS — I65.21 INTERNAL CAROTID ARTERY STENOSIS, RIGHT: Primary | ICD-10-CM

## 2024-09-20 DIAGNOSIS — I63.50 RIGHT PONTINE STROKE (HCC): ICD-10-CM

## 2024-09-20 PROCEDURE — 99214 OFFICE O/P EST MOD 30 MIN: CPT | Performed by: NURSE PRACTITIONER

## 2024-09-20 PROCEDURE — G2211 COMPLEX E/M VISIT ADD ON: HCPCS | Performed by: NURSE PRACTITIONER

## 2024-09-20 NOTE — PROGRESS NOTES
Ambulatory Visit  Name: Theresa Bennett      : 1955      MRN: [id number]  Encounter Provider: JOCELIN Toscano  Encounter Date: 2024   Encounter department: [facility]      Pt is a 69 yr old female   Presents in office for follow up and discussion on work up   Was seen previously with concerns of continued left sided weakness but worsening from previous stroke. She had blood work done which showed low potassium at the time and she was supposed to repeat the electrolytes but has not done so   CTA reviewed will need to see neurosurgery- discussed in office     INTERNAL CAROTID ARTERIES: Moderate calcification of the intracranial internal carotid artery on the right most prominent within the proximal cavernous segment where there is moderate stenosis, in the range of 60 to 65%. Normal ophthalmic artery origin.   On the left the intracranial internal carotid artery is diminished in caliber and there is mild stenosis of the cavernous segment anteriorly. No occlusion.    Referred to neurosurgery to review and monitor and also follow up with neurology.     She would like to return to work   Discussed concerns with weakness and she did not consult with physical therapy   Feels well enough to return - needs to go back financially   Return note given as tolerated   I will see her back in 4 months after seen by neurology and neurosurgery. IF any worsening of  symptoms , weakness falls worsening headaches --> ER   Assessment & Plan  Internal carotid artery stenosis, right         DM type 2, not at goal (HCC)    Lab Results   Component Value Date    HGBA1C 8.4 (H) 2024            Right pontine stroke (HCC)         Left-sided weakness           Depression Screening and Follow-up Plan: Patient was screened for depression during today's encounter. They screened negative with a PHQ-2 score of 0.    Falls Plan of Care: balance, strength, and gait training instructions were  provided. Recommended assistive device to help with gait and balance. Medications that increase falls were reviewed. Vitamin D supplementation was recommended. Home safety education provided.       History of Present Illness     Pt is a 69 yr old female   Presents in office for follow up and discussion on work up   Was seen previously with concerns of continued left sided weakness but worsening from previous stroke. She had blood work done which showed low potassium at the time and she was supposed to repeat the electrolytes but has not done so   CTA reviewed will need to see neurosurgery- discussed in office     INTERNAL CAROTID ARTERIES: Moderate calcification of the intracranial internal carotid artery on the right most prominent within the proximal cavernous segment where there is moderate stenosis, in the range of 60 to 65%. Normal ophthalmic artery origin.   On the left the intracranial internal carotid artery is diminished in caliber and there is mild stenosis of the cavernous segment anteriorly. No occlusion.    Referred to neurosurgery to review and monitor and also follow up with neurology.         History obtained from : patient  Review of Systems   Constitutional:  Positive for fatigue. Negative for fever and unexpected weight change.   HENT:  Negative for congestion, nosebleeds and sinus pressure.    Eyes: Negative.    Respiratory:  Negative for cough and shortness of breath.    Cardiovascular:  Negative for chest pain and palpitations.        HTN    Gastrointestinal:  Negative for abdominal distention, abdominal pain, nausea and vomiting.   Endocrine:        Diabetes    Genitourinary:  Negative for difficulty urinating and flank pain.   Musculoskeletal:  Positive for arthralgias, gait problem and myalgias.        Gait abnormality  Balance issues and weakness  to left side   Did not get to see PT yet    Skin:  Negative for rash.   Allergic/Immunologic: Negative for environmental allergies.    Neurological:  Positive for weakness and light-headedness.        Here to discuss abnormal CTA    Hematological:  Negative for adenopathy.   Psychiatric/Behavioral:  Negative for sleep disturbance and suicidal ideas. The patient is nervous/anxious.      Pertinent Medical History     Reviewed       Medical History Reviewed by provider this encounter:  Tobacco  Allergies  Meds  Problems  Med Hx  Surg Hx  Fam Hx       Past Medical History   Past Medical History:   Diagnosis Date    Diabetes mellitus (HCC)     Hyperlipidemia     Hypertension      Past Surgical History:   Procedure Laterality Date     SECTION      HYSTERECTOMY      TONSILLECTOMY       History reviewed. No pertinent family history.  Current Outpatient Medications on File Prior to Visit   Medication Sig Dispense Refill    albuterol (PROVENTIL HFA,VENTOLIN HFA) 90 mcg/act inhaler Inhale 2 puffs 4 (four) times a day As needed 18 g 1    aspirin 81 mg chewable tablet Chew 1 tablet (81 mg total) daily Please buy over the counter 100 tablet 0    atorvastatin (LIPITOR) 40 mg tablet Take 1 tablet (40 mg total) by mouth daily 100 tablet 0    Continuous Glucose Sensor (FreeStyle Benjamín 3 Sensor) MISC Use 1 each every 14 (fourteen) days 6 each 3    Diclofenac Sodium (VOLTAREN) 1 % Apply 2 g topically 4 (four) times a day For pain to affected area 100 g 0    gabapentin (NEURONTIN) 100 mg capsule Take 1 capsule (100 mg total) by mouth daily with breakfast 100 capsule 0    gabapentin (Neurontin) 300 mg capsule Take 1 capsule (300 mg total) by mouth daily at bedtime 30 capsule 3    lifitegrast (Xiidra) 5 % op solution Administer 1 drop to both eyes 2 (two) times a day PRN      loratadine (CLARITIN) 10 mg tablet Take 1 tablet (10 mg total) by mouth daily 100 tablet 0    potassium chloride (Klor-Con M10) 10 mEq tablet Take 2 tablets (20 mEq total) by mouth daily 200 tablet 0    Restasis 0.05 % ophthalmic emulsion Administer 1 drop to both eyes daily as  needed      semaglutide, 1 mg/dose, (Ozempic) 4 mg/3 mL injection pen Inject 0.75 mL (1 mg total) under the skin every 7 days 12 mL 0    Synjardy 5-500 MG TABS Take 2 tablets by mouth 2 (two) times a day 400 tablet 0    valsartan-hydrochlorothiazide (DIOVAN-HCT) 160-25 MG per tablet Take 1 tablet by mouth daily 100 tablet 0     No current facility-administered medications on file prior to visit.   No Known Allergies   Current Outpatient Medications on File Prior to Visit   Medication Sig Dispense Refill    albuterol (PROVENTIL HFA,VENTOLIN HFA) 90 mcg/act inhaler Inhale 2 puffs 4 (four) times a day As needed 18 g 1    aspirin 81 mg chewable tablet Chew 1 tablet (81 mg total) daily Please buy over the counter 100 tablet 0    atorvastatin (LIPITOR) 40 mg tablet Take 1 tablet (40 mg total) by mouth daily 100 tablet 0    Continuous Glucose Sensor (FreeStyle Benjmaín 3 Sensor) MISC Use 1 each every 14 (fourteen) days 6 each 3    Diclofenac Sodium (VOLTAREN) 1 % Apply 2 g topically 4 (four) times a day For pain to affected area 100 g 0    gabapentin (NEURONTIN) 100 mg capsule Take 1 capsule (100 mg total) by mouth daily with breakfast 100 capsule 0    gabapentin (Neurontin) 300 mg capsule Take 1 capsule (300 mg total) by mouth daily at bedtime 30 capsule 3    lifitegrast (Xiidra) 5 % op solution Administer 1 drop to both eyes 2 (two) times a day PRN      loratadine (CLARITIN) 10 mg tablet Take 1 tablet (10 mg total) by mouth daily 100 tablet 0    potassium chloride (Klor-Con M10) 10 mEq tablet Take 2 tablets (20 mEq total) by mouth daily 200 tablet 0    Restasis 0.05 % ophthalmic emulsion Administer 1 drop to both eyes daily as needed      semaglutide, 1 mg/dose, (Ozempic) 4 mg/3 mL injection pen Inject 0.75 mL (1 mg total) under the skin every 7 days 12 mL 0    Synjardy 5-500 MG TABS Take 2 tablets by mouth 2 (two) times a day 400 tablet 0    valsartan-hydrochlorothiazide (DIOVAN-HCT) 160-25 MG per tablet Take 1 tablet by  "mouth daily 100 tablet 0     No current facility-administered medications on file prior to visit.      Social History     Tobacco Use    Smoking status: Never    Smokeless tobacco: Never   Vaping Use    Vaping status: Never Used   Substance and Sexual Activity    Alcohol use: Not Currently    Drug use: Never    Sexual activity: Never         Objective     /70 (BP Location: Left arm, Patient Position: Sitting, Cuff Size: Large)   Pulse 89   Temp 97.5 °F (36.4 °C)   Ht 5' 1\" (1.549 m)   Wt 83.5 kg (184 lb)   SpO2 96%   BMI 34.77 kg/m²     Physical Exam  Vitals and nursing note reviewed.   Constitutional:       Appearance: Normal appearance.   HENT:      Head: Atraumatic.   Eyes:      Extraocular Movements: Extraocular movements intact.   Cardiovascular:      Rate and Rhythm: Normal rate and regular rhythm.      Pulses: Normal pulses.      Heart sounds: Normal heart sounds.   Pulmonary:      Effort: Pulmonary effort is normal.      Breath sounds: Normal breath sounds.   Abdominal:      Palpations: Abdomen is soft.   Musculoskeletal:      Cervical back: Normal range of motion.      Right lower leg: No edema.      Left lower leg: No edema.   Skin:     General: Skin is warm.   Neurological:      Mental Status: She is alert and oriented to person, place, and time.      Sensory: Sensory deficit present.      Motor: Weakness present.      Coordination: Coordination abnormal.      Comments: Slight left sided weakness   Pt is a 69 yr old female   Presents in office for follow up and discussion on work up   Was seen previously with concerns of continued left sided weakness but worsening from previous stroke. She had blood work done which showed low potassium at the time and she was supposed to repeat the electrolytes but has not done so   CTA reviewed will need to see neurosurgery- discussed in office     INTERNAL CAROTID ARTERIES: Moderate calcification of the intracranial internal carotid artery on the right most " prominent within the proximal cavernous segment where there is moderate stenosis, in the range of 60 to 65%. Normal ophthalmic artery origin.   On the left the intracranial internal carotid artery is diminished in caliber and there is mild stenosis of the cavernous segment anteriorly. No occlusion.    Referred to neurosurgery to review and monitor and also follow up with neurology.        Psychiatric:         Mood and Affect: Mood normal.         Behavior: Behavior normal.       Administrative Statements   I have spent a total time of 40  minutes in caring for this patient on the day of the visit/encounter including Diagnostic results, Prognosis, Risks and benefits of tx options, Instructions for management, Patient and family education, Importance of tx compliance, Risk factor reductions, Impressions, Counseling / Coordination of care, Documenting in the medical record, Reviewing / ordering tests, medicine, procedures  , Obtaining or reviewing history  , and Communicating with other healthcare professionals .

## 2024-09-20 NOTE — LETTER
September 20, 2024     Patient: Theresa Bennett  YOB: 1955  Date of Visit: 9/20/2024      To Whom it May Concern:    Theresa Bennett is under my professional care. Theresa was seen in my office on 9/20/2024. Theresa may return to work on 09/24/2024 . We will go back to work as tolerated if any further issues she is to return.  Follow up with neurosurgery and neurology as discussed.     If you have any questions or concerns, please don't hesitate to call.         Sincerely,          JOCELIN Toscano        CC: No Recipients

## 2024-09-23 ENCOUNTER — CONSULT (OUTPATIENT)
Dept: NEUROSURGERY | Facility: CLINIC | Age: 69
End: 2024-09-23
Payer: COMMERCIAL

## 2024-09-23 VITALS
OXYGEN SATURATION: 97 % | SYSTOLIC BLOOD PRESSURE: 130 MMHG | TEMPERATURE: 96.9 F | HEIGHT: 61 IN | DIASTOLIC BLOOD PRESSURE: 68 MMHG | HEART RATE: 84 BPM | RESPIRATION RATE: 16 BRPM | WEIGHT: 182 LBS | BODY MASS INDEX: 34.36 KG/M2

## 2024-09-23 DIAGNOSIS — M79.605 LEG PAIN, ANTERIOR, LEFT: ICD-10-CM

## 2024-09-23 DIAGNOSIS — R26.89 BALANCE PROBLEM: ICD-10-CM

## 2024-09-23 DIAGNOSIS — I65.23 CAROTID STENOSIS, BILATERAL: ICD-10-CM

## 2024-09-23 DIAGNOSIS — I63.239 CAROTID ARTERY STENOSIS WITH CEREBRAL INFARCTION (HCC): ICD-10-CM

## 2024-09-23 DIAGNOSIS — Z86.73 HISTORY OF LACUNAR CEREBROVASCULAR ACCIDENT (CVA): ICD-10-CM

## 2024-09-23 DIAGNOSIS — R53.1 INCREASED WEAKNESS WHEN AMBULATING: ICD-10-CM

## 2024-09-23 PROCEDURE — 99203 OFFICE O/P NEW LOW 30 MIN: CPT | Performed by: PHYSICIAN ASSISTANT

## 2024-09-23 NOTE — PROGRESS NOTES
Ambulatory Visit  Name: Theresa Bennett      : 1955      MRN: [id number]  Encounter Provider: Dagoberto Keenan PA-C  Encounter Date: 2024   Encounter department: [facility] NEUROSURGICAL [facility]    Assessment & Plan  Carotid artery stenosis with cerebral infarction (HCC)  Patient is a 69 years old pleasant woman with PMHx of HTN, obesity, Hyperlipidemia, DM, s/p small right pontine infarction with residual dysthesia, today with her daughter for evaluation of abnormal CTA head and neck.  Patient has history of right hemipons infarction in , with left side body weakness and numbness.  She reports this weakness and numbness resolved after she did extensive physical therapy but came back after she stopped physical therapy, she feels slight weakness and numbness in her left side of her body.  Patient has also memory issues, specifically difficulty with recalling short-term events.  She reports mild headache, otherwise denies any blurry vision, diplopia, nausea, vomiting, seizures, speech or swallowing problem.  Has occasional wobbly gait otherwise denies history of falls or bowel/bladder dysfunction.    CTA head and neck reported stable bilateral left more than right internal carotid artery stenosis. And anatomical variations of the origins of Carotid arteries. Overall, CTA findings are stable when compared to CTA in .    US  of the carotid arteries shows less than 50% stenosis.    Exam-Patient is A&OX3, PERRL, EOMI 3mm conjugate bilaterally. SF. Maxine. Finger to nose test shows slight past pointing dysmetria, Left  strength 4+/5 including left elbow flexion and extension. RUE and Bilat LEs strength 5/5, Sensation to LT decreased in the left Upper and Left Lower extremities. DTR 2+, no clonus bilaterally. Gait unstable.    Hx, Exam and images reviewed with the patient. Mx plan discussed. Dr Rolon reviewed the images. He recommends Patient needs to F/U with neurology, continue ASA and  "statin. No follow up images are required with NSX. Questions and concerns were answered to patient's satisfaction. Both Patient and her daughter understand the instructions and agreed with the plan.    Plan:  From NSX perspective, no procedure or regular follow up images is required  Advised to continue baby ASA and statin and F/U with Neurology  F/U on PRN basis  Call with questions or concerns.    Orders:    Ambulatory Referral to Neurosurgery      History of Present Illness     C/C: \" Patient is referred by her PCP for abnormal CTA neck and head\"      HPI    See detailed discussion above    Review of Systems   HENT:  Positive for hearing loss (both ears) and tinnitus (right ear).    Eyes:         Wears glasses   Gastrointestinal: Negative.    Genitourinary: Negative.    Musculoskeletal:  Positive for arthralgias, back pain, gait problem (off balance at times), myalgias and neck pain.   Neurological:  Positive for tremors (slightly in both hands), light-headedness (constant), numbness (N/T whole left side) and headaches (constant).   Psychiatric/Behavioral:  Positive for sleep disturbance (trouble falling asleep).    All other systems reviewed and are negative.    I have personally reviewed the MA's review of systems and made changes as necessary.      Objective     /68 (BP Location: Left arm, Patient Position: Sitting, Cuff Size: Adult)   Pulse 84   Temp (!) 96.9 °F (36.1 °C) (Tympanic)   Resp 16   Ht 5' 1\" (1.549 m)   Wt 82.6 kg (182 lb)   SpO2 97%   BMI 34.39 kg/m²     Physical Exam  Constitutional:       Appearance: She is obese.   Eyes:      Extraocular Movements: Extraocular movements intact.      Pupils: Pupils are equal, round, and reactive to light.   Musculoskeletal:         General: Normal range of motion.      Cervical back: Normal range of motion. No tenderness.   Neurological:      Mental Status: She is alert and oriented to person, place, and time.      Sensory: Sensory deficit present. "      Motor: Weakness present.      Gait: Gait abnormal.      Deep Tendon Reflexes:      Reflex Scores:       Tricep reflexes are 2+ on the right side and 2+ on the left side.       Bicep reflexes are 2+ on the right side and 2+ on the left side.       Brachioradialis reflexes are 2+ on the right side and 2+ on the left side.       Patellar reflexes are 2+ on the right side and 2+ on the left side.       Achilles reflexes are 2+ on the right side and 2+ on the left side.  Psychiatric:         Speech: Speech normal.   Neurologic Exam     Mental Status   Oriented to person, place, and time.   Speech: speech is normal   Level of consciousness: alert    Cranial Nerves     CN II   Visual acuity: normal  Right visual field deficit: none  Left visual field deficit: none     CN III, IV, VI   Pupils are equal, round, and reactive to light.  Right pupil: Size: 3 mm. Shape: regular. Reactivity: brisk.   Left pupil: Size: 3 mm. Shape: regular. Reactivity: brisk.   Nystagmus: none     CN XI   CN XI normal.     Motor Exam   Muscle bulk: normal  Overall muscle tone: normal  Right arm tone: normal  Left arm tone: normal  Right arm pronator drift: absent  Left arm pronator drift: absent  Right leg tone: normal  Left leg tone: normal    Sensory Exam   Light touch normal.     Gait, Coordination, and Reflexes     Reflexes   Right brachioradialis: 2+  Left brachioradialis: 2+  Right biceps: 2+  Left biceps: 2+  Right triceps: 2+  Left triceps: 2+  Right patellar: 2+  Left patellar: 2+  Right achilles: 2+  Left achilles: 2+  Right : 2+  Left : 2+  Right Palmer: absent  Left Palmer: absent  Right ankle clonus: absent  Left ankle clonus: absent    I personally reviewed the following image studies in PACS and associated radiology reports: CT A. My interpretation of the radiology images/reports is: see detailed discussion above.    Administrative Statements   I have spent a total time of 35 minutes in caring for this patient on the  day of the visit/encounter including Diagnostic results, Prognosis, Risks and benefits of tx options, Instructions for management, Patient and family education, Importance of tx compliance, Risk factor reductions, Impressions, Counseling / Coordination of care, Documenting in the medical record, Reviewing / ordering tests, medicine, procedures  , and Obtaining or reviewing history  .

## 2024-10-04 DIAGNOSIS — E11.9 DM TYPE 2, NOT AT GOAL (HCC): ICD-10-CM

## 2024-10-15 ENCOUNTER — VBI (OUTPATIENT)
Dept: ADMINISTRATIVE | Facility: OTHER | Age: 69
End: 2024-10-15

## 2024-10-15 ENCOUNTER — HOSPITAL ENCOUNTER (OUTPATIENT)
Dept: VASCULAR ULTRASOUND | Facility: HOSPITAL | Age: 69
Discharge: HOME | End: 2024-10-15
Payer: COMMERCIAL

## 2024-10-15 DIAGNOSIS — M79.605 LEG PAIN, ANTERIOR, LEFT: ICD-10-CM

## 2024-10-15 DIAGNOSIS — Z86.79 HISTORY OF ARTERIAL DISEASE OF LOWER EXTREMITY: Primary | ICD-10-CM

## 2024-10-15 DIAGNOSIS — I77.9 ARTERIAL DISEASE (HCC): ICD-10-CM

## 2024-10-15 PROCEDURE — 93922 UPR/L XTREMITY ART 2 LEVELS: CPT | Performed by: SURGERY

## 2024-10-15 PROCEDURE — 93925 LOWER EXTREMITY STUDY: CPT | Performed by: SURGERY

## 2024-10-15 PROCEDURE — 93925 LOWER EXTREMITY STUDY: CPT

## 2024-10-15 PROCEDURE — 93923 UPR/LXTR ART STDY 3+ LVLS: CPT

## 2024-10-15 NOTE — TELEPHONE ENCOUNTER
10/15/24 2:23 PM     Chart reviewed for Hemoglobin A1c and Microalbumin Creatinine Urine Ratio OR Albumin Creatinine Urine Ratio  was/were submitted to the patient's insurance.     Tete Macedo   [facility]

## 2024-10-15 NOTE — RESULT ENCOUNTER NOTE
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

## 2024-10-16 ENCOUNTER — TELEPHONE (OUTPATIENT)
Age: 69
End: 2024-10-16

## 2024-10-16 NOTE — TELEPHONE ENCOUNTER
Caller: Theresa     Doctor: Danny/Antonette    Reason for call: Patients 12/9/24 appointment is scheduled with a NP and it needs to be rescheduled with a VS. Dr. Delgado doesn't have any available slots. Patient is willing to be seen sooner.    I did add her to the wait list.    RR- 10/15/24; CTA- 9/17/24; yann; medi; DANNY       Call back#: [phone]

## 2024-10-18 ENCOUNTER — TELEPHONE (OUTPATIENT)
Age: 69
End: 2024-10-18

## 2024-12-06 NOTE — PROGRESS NOTES
"Name: Theresa Bennett      : 1955      MRN: [id number]  Encounter Provider: JOCELIN Vasquez  Encounter Date: 2024   Encounter department: [facility]  :  Assessment & Plan  PAD (peripheral artery disease) (HCC)  69-year-old female with HTN, obesity, uncontrolled DM type II, HLD, OA, and h/o right pontine CVA with residual LLE numbness presents to review lower extremity arterial duplex imaging with complaints of LLE numbness.    -Patient complains of LLE numbness \"in patches\", up and down left leg also occasional left hand numbness and left back flank numbness.  -Denies short distance claudication, no ischemic rest pain.  No wounds  -Reports symptoms have been ongoing since CVA approximately 1 year ago.  -Symptoms improved with physical therapy  -Patient has bilateral neuropathy.  -A1c 8.4.  -Maintained on aspirin, statin and Neurontin    LEAD 10/15/2024  RIGHT: Diffuse disease fem-pop arteries with > 75%  mSFA stenosis.  TRACIE 0.74/90/69  LEFT: Diffuse disease fem-popliteal arteries with 50-75% stenosis in proximal and mid SFA.  TRACIE 0.71/108/77    Plan:  -No vascular intervention indicated at this time  -LEAD -yearly, due 2025  -Return to office after imaging  -Continue daily aspirin and statin  -Follow-up with neurology/PCP as scheduled.  Return to PT?  -Strict diabetic control, daily foot checks  Call return to office with new or worsening symptoms, questions or concerns      Orders:    VAS ARTERIAL DUPLEX- LOWER LIMB BILATERAL; Future    History of arterial disease of lower extremity  - PAD  Orders:    Ambulatory Referral to Vascular Surgery    Arterial disease (HCC)  -PAD  Orders:    Ambulatory Referral to Vascular Surgery        History of Present Illness     Patient referred for PAD and presents today for further evaluation. CAREY done 10/15/24. Denies claudication. Admits to numbness in legs and feet that is constant.     HPI  Theresa Bennett is a 69 " "y.o. female who presents to review lower extremity arterial duplex imaging and establish care.    Patient with history of CVA approximately 1 year ago with left-sided numbness and weakness.  Since that time she has had intermittent LLE numbness \"in patches\".  She also reports occasional left hand and left flank numbness.  Patient reports LLE numbness improved with physical therapy.    She has no short distance claudication, ischemic rest pain or foot wounds.    Reviewed with evidence of PAD however symptoms experiencing are not associated with PAD.  More likely neuropathic in nature.  Patient denies low back pain.  No indication for intervention at this time.    Recommend continued Optimal medical management with aspirin and statin which she is already on.  Advise follow-up with neurology and PCP as scheduled  Strict diabetic control and daily foot checks      History obtained from: patient    Review of Systems   Constitutional: Negative.    HENT: Negative.     Eyes: Negative.    Respiratory: Negative.     Cardiovascular: Negative.    Gastrointestinal: Negative.    Endocrine: Negative.    Genitourinary: Negative.    Musculoskeletal: Negative.    Skin: Negative.    Allergic/Immunologic: Negative.    Neurological:  Positive for numbness.   Hematological: Negative.    Psychiatric/Behavioral: Negative.       Medical History Reviewed by provider this encounter:  Tobacco  Allergies  Meds  Problems  Med Hx  Surg Hx  Fam Hx     .  Past Medical History   Past Medical History:   Diagnosis Date    Diabetes mellitus (HCC)     Hyperlipidemia     Hypertension      Past Surgical History:   Procedure Laterality Date     SECTION      HYSTERECTOMY      TONSILLECTOMY       History reviewed. No pertinent family history.   reports that she has never smoked. She has never used smokeless tobacco. She reports that she does not currently use alcohol. She reports that she does not use drugs.  Current Outpatient Medications on " File Prior to Visit   Medication Sig Dispense Refill    aspirin 81 mg chewable tablet Chew 1 tablet (81 mg total) daily Please buy over the counter 100 tablet 0    atorvastatin (LIPITOR) 40 mg tablet Take 1 tablet (40 mg total) by mouth daily 100 tablet 0    Continuous Glucose Sensor (FreeStyle Benjamín 3 Sensor) [facility] [place] Use 1 each every 14 (fourteen) days 6 each 3    Diclofenac Sodium (VOLTAREN) 1 % Apply 2 g topically 4 (four) times a day For pain to affected area 100 g 0    gabapentin (NEURONTIN) 100 mg capsule Take 1 capsule (100 mg total) by mouth daily with breakfast 100 capsule 0    gabapentin (Neurontin) 300 mg capsule Take 1 capsule (300 mg total) by mouth daily at bedtime 30 capsule 3    lifitegrast (Xiidra) 5 % op solution Administer 1 drop to both eyes 2 (two) times a day PRN      loratadine (CLARITIN) 10 mg tablet Take 1 tablet (10 mg total) by mouth daily 100 tablet 0    Restasis 0.05 % ophthalmic emulsion Administer 1 drop to both eyes daily as needed      semaglutide, 1 mg/dose, (Ozempic, 1 MG/DOSE,) 4 mg/3 mL injection pen Inject 0.75 mL (1 mg total) under the skin every 7 days 9 mL 0    Synjardy 5-500 MG TABS Take 2 tablets by mouth 2 (two) times a day 400 tablet 0    valsartan-hydrochlorothiazide (DIOVAN-HCT) 160-25 MG per tablet Take 1 tablet by mouth daily 100 tablet 0    potassium chloride (Klor-Con M10) 10 mEq tablet Take 2 tablets (20 mEq total) by mouth daily (Patient not taking: Reported on 12/9/2024) 200 tablet 0     No current facility-administered medications on file prior to visit.   No Known Allergies   Current Outpatient Medications on File Prior to Visit   Medication Sig Dispense Refill    aspirin 81 mg chewable tablet Chew 1 tablet (81 mg total) daily Please buy over the counter 100 tablet 0    atorvastatin (LIPITOR) 40 mg tablet Take 1 tablet (40 mg total) by mouth daily 100 tablet 0    Continuous Glucose Sensor (FreeStyle Benjamín 3 Sensor) List of hospitals in the [place] Use 1 each every 14 (fourteen) days 6 each 3  "   Diclofenac Sodium (VOLTAREN) 1 % Apply 2 g topically 4 (four) times a day For pain to affected area 100 g 0    gabapentin (NEURONTIN) 100 mg capsule Take 1 capsule (100 mg total) by mouth daily with breakfast 100 capsule 0    gabapentin (Neurontin) 300 mg capsule Take 1 capsule (300 mg total) by mouth daily at bedtime 30 capsule 3    lifitegrast (Xiidra) 5 % op solution Administer 1 drop to both eyes 2 (two) times a day PRN      loratadine (CLARITIN) 10 mg tablet Take 1 tablet (10 mg total) by mouth daily 100 tablet 0    Restasis 0.05 % ophthalmic emulsion Administer 1 drop to both eyes daily as needed      semaglutide, 1 mg/dose, (Ozempic, 1 MG/DOSE,) 4 mg/3 mL injection pen Inject 0.75 mL (1 mg total) under the skin every 7 days 9 mL 0    Synjardy 5-500 MG TABS Take 2 tablets by mouth 2 (two) times a day 400 tablet 0    valsartan-hydrochlorothiazide (DIOVAN-HCT) 160-25 MG per tablet Take 1 tablet by mouth daily 100 tablet 0    potassium chloride (Klor-Con M10) 10 mEq tablet Take 2 tablets (20 mEq total) by mouth daily (Patient not taking: Reported on 12/9/2024) 200 tablet 0     No current facility-administered medications on file prior to visit.      Social History     Tobacco Use    Smoking status: Never    Smokeless tobacco: Never   Vaping Use    Vaping status: Never Used   Substance and Sexual Activity    Alcohol use: Not Currently    Drug use: Never    Sexual activity: Never        Objective   /68 (BP Location: Left arm, Patient Position: Sitting)   Pulse 90   Ht 5' 1\" (1.549 m)   Wt 83.5 kg (184 lb)   BMI 34.77 kg/m²      Physical Exam  Vitals reviewed.   Constitutional:       General: She is not in acute distress.     Appearance: She is obese.   HENT:      Head: Normocephalic and atraumatic.   Neck:      Vascular: No carotid bruit.   Cardiovascular:      Rate and Rhythm: Normal rate and regular rhythm.      Pulses:           Carotid pulses are 2+ on the right side and 2+ on the left side.       " "Radial pulses are 2+ on the right side and 2+ on the left side.        Dorsalis pedis pulses are 1+ on the right side and detected w/ Doppler on the left side.        Posterior tibial pulses are detected w/ Doppler on the right side and detected w/ Doppler on the left side.      Heart sounds: Normal heart sounds. No murmur heard.  Pulmonary:      Effort: Pulmonary effort is normal. No respiratory distress.      Breath sounds: Normal breath sounds.   Musculoskeletal:      Cervical back: Normal range of motion and neck supple.      Right lower leg: No edema.      Left lower leg: No edema.   Skin:     General: Skin is warm.   Neurological:      General: No focal deficit present.      Mental Status: She is alert and oriented to person, place, and time.      Cranial Nerves: No cranial nerve deficit.      Sensory: Sensory deficit present.      Motor: No weakness.      Comments: Reported LLE numbness in \"patches\"   Psychiatric:         Mood and Affect: Mood normal.         Behavior: Behavior normal.         Administrative Statements   I have spent a total time of 35 minutes in caring for this patient on the day of the visit/encounter including Diagnostic results, Risks and benefits of tx options, Instructions for management, Patient and family education, Importance of tx compliance, Risk factor reductions, Impressions, Documenting in the medical record, Reviewing / ordering tests, medicine, procedures  , and Obtaining or reviewing history  .   "

## 2024-12-09 ENCOUNTER — OFFICE VISIT (OUTPATIENT)
Dept: VASCULAR SURGERY | Facility: CLINIC | Age: 69
End: 2024-12-09
Payer: COMMERCIAL

## 2024-12-09 VITALS
SYSTOLIC BLOOD PRESSURE: 128 MMHG | HEART RATE: 90 BPM | DIASTOLIC BLOOD PRESSURE: 68 MMHG | HEIGHT: 61 IN | WEIGHT: 184 LBS | BODY MASS INDEX: 34.74 KG/M2

## 2024-12-09 DIAGNOSIS — I73.9 PAD (PERIPHERAL ARTERY DISEASE) (HCC): Primary | ICD-10-CM

## 2024-12-09 DIAGNOSIS — Z86.79 HISTORY OF ARTERIAL DISEASE OF LOWER EXTREMITY: ICD-10-CM

## 2024-12-09 DIAGNOSIS — I77.9 ARTERIAL DISEASE (HCC): ICD-10-CM

## 2024-12-09 PROCEDURE — 99214 OFFICE O/P EST MOD 30 MIN: CPT | Performed by: NURSE PRACTITIONER

## 2024-12-09 NOTE — ASSESSMENT & PLAN NOTE
"69-year-old female with HTN, obesity, uncontrolled DM type II, HLD, OA, and h/o right pontine CVA with residual LLE numbness presents to review lower extremity arterial duplex imaging with complaints of LLE numbness.    -Patient complains of LLE numbness \"in patches\", up and down left leg also occasional left hand numbness and left back flank numbness.  -Denies short distance claudication, no ischemic rest pain.  No wounds  -Reports symptoms have been ongoing since CVA approximately 1 year ago.  -Symptoms improved with physical therapy  -Patient has bilateral neuropathy.  -A1c 8.4.  -Maintained on aspirin, statin and Neurontin    LEAD 10/15/2024  RIGHT: Diffuse disease fem-pop arteries with > 75%  mSFA stenosis.  TRACIE 0.74/90/69  LEFT: Diffuse disease fem-popliteal arteries with 50-75% stenosis in proximal and mid SFA.  TRACIE 0.71/108/77    Plan:  -No vascular intervention indicated at this time  -LEAD -yearly, due October 2025  -Return to office after imaging  -Continue daily aspirin and statin  -Follow-up with neurology/PCP as scheduled.  Return to PT?  -Strict diabetic control, daily foot checks  Call return to office with new or worsening symptoms, questions or concerns      Orders:    VAS ARTERIAL DUPLEX- LOWER LIMB BILATERAL; Future    "

## 2024-12-09 NOTE — PATIENT INSTRUCTIONS
Lower extremity arterial duplex yearly due October 2025  Continue daily aspirin 81 mg  Continue statin  No further vascular imaging or intervention indicated at this time    Follow-up with neurology and PCP as scheduled  Consider return to PT ?    Strict diabetic control  Daily foot checks  Call return to office with new or worsening symptoms, questions or concerns

## 2025-01-17 ENCOUNTER — TELEPHONE (OUTPATIENT)
Age: 70
End: 2025-01-17

## 2025-01-20 ENCOUNTER — APPOINTMENT (OUTPATIENT)
Dept: LAB | Facility: CLINIC | Age: 70
End: 2025-01-20
Payer: COMMERCIAL

## 2025-01-20 DIAGNOSIS — E87.6 HYPOKALEMIA: ICD-10-CM

## 2025-01-20 PROCEDURE — 36415 COLL VENOUS BLD VENIPUNCTURE: CPT

## 2025-01-20 PROCEDURE — 80051 ELECTROLYTE PANEL: CPT

## 2025-01-21 ENCOUNTER — OFFICE VISIT (OUTPATIENT)
Dept: FAMILY MEDICINE CLINIC | Facility: CLINIC | Age: 70
End: 2025-01-21
Payer: COMMERCIAL

## 2025-01-21 VITALS
OXYGEN SATURATION: 97 % | SYSTOLIC BLOOD PRESSURE: 118 MMHG | HEART RATE: 84 BPM | HEIGHT: 61 IN | DIASTOLIC BLOOD PRESSURE: 68 MMHG | WEIGHT: 180 LBS | TEMPERATURE: 97.8 F | BODY MASS INDEX: 33.99 KG/M2

## 2025-01-21 DIAGNOSIS — I10 HYPERTENSION GOAL BP (BLOOD PRESSURE) < 130/80: ICD-10-CM

## 2025-01-21 DIAGNOSIS — E11.9 DM TYPE 2, NOT AT GOAL (HCC): ICD-10-CM

## 2025-01-21 DIAGNOSIS — J30.2 SEASONAL ALLERGIES: ICD-10-CM

## 2025-01-21 DIAGNOSIS — Z53.20 COLON CANCER SCREENING DECLINED: ICD-10-CM

## 2025-01-21 DIAGNOSIS — I65.23 CAROTID STENOSIS, BILATERAL: ICD-10-CM

## 2025-01-21 DIAGNOSIS — Z53.20 OSTEOPOROSIS SCREENING DECLINED: ICD-10-CM

## 2025-01-21 DIAGNOSIS — E66.9 OBESITY (BMI 30-39.9): ICD-10-CM

## 2025-01-21 DIAGNOSIS — E87.6 HYPOKALEMIA: ICD-10-CM

## 2025-01-21 DIAGNOSIS — I73.9 PAD (PERIPHERAL ARTERY DISEASE) (HCC): ICD-10-CM

## 2025-01-21 DIAGNOSIS — Z53.20 MAMMOGRAM DECLINED: ICD-10-CM

## 2025-01-21 DIAGNOSIS — E11.9 TYPE 2 DIABETES MELLITUS WITHOUT COMPLICATION, WITHOUT LONG-TERM CURRENT USE OF INSULIN (HCC): ICD-10-CM

## 2025-01-21 DIAGNOSIS — Z59.86 FINANCIAL INSECURITY: ICD-10-CM

## 2025-01-21 DIAGNOSIS — E78.2 MIXED HYPERLIPIDEMIA: ICD-10-CM

## 2025-01-21 DIAGNOSIS — E55.9 VITAMIN D DEFICIENCY: ICD-10-CM

## 2025-01-21 DIAGNOSIS — I10 BENIGN ESSENTIAL HYPERTENSION: Primary | ICD-10-CM

## 2025-01-21 DIAGNOSIS — Z86.73 HISTORY OF LACUNAR CEREBROVASCULAR ACCIDENT (CVA): ICD-10-CM

## 2025-01-21 LAB
ANION GAP SERPL CALCULATED.3IONS-SCNC: 12 MMOL/L (ref 4–13)
CHLORIDE SERPL-SCNC: 98 MMOL/L (ref 96–108)
CO2 SERPL-SCNC: 30 MMOL/L (ref 21–32)
POTASSIUM SERPL-SCNC: 3.7 MMOL/L (ref 3.5–5.3)
SL AMB POCT HEMOGLOBIN AIC: 7.8 (ref ?–6.5)
SODIUM SERPL-SCNC: 140 MMOL/L (ref 135–147)

## 2025-01-21 PROCEDURE — 99214 OFFICE O/P EST MOD 30 MIN: CPT | Performed by: NURSE PRACTITIONER

## 2025-01-21 PROCEDURE — G2211 COMPLEX E/M VISIT ADD ON: HCPCS | Performed by: NURSE PRACTITIONER

## 2025-01-21 PROCEDURE — 83036 HEMOGLOBIN GLYCOSYLATED A1C: CPT | Performed by: NURSE PRACTITIONER

## 2025-01-21 RX ORDER — POTASSIUM CHLORIDE 750 MG/1
20 TABLET, EXTENDED RELEASE ORAL DAILY
Qty: 200 TABLET | Refills: 0 | Status: SHIPPED | OUTPATIENT
Start: 2025-01-21 | End: 2025-05-01

## 2025-01-21 RX ORDER — ATORVASTATIN CALCIUM 40 MG/1
40 TABLET, FILM COATED ORAL DAILY
Qty: 100 TABLET | Refills: 0 | Status: SHIPPED | OUTPATIENT
Start: 2025-01-21 | End: 2025-05-01

## 2025-01-21 RX ORDER — VALSARTAN AND HYDROCHLOROTHIAZIDE 160; 25 MG/1; MG/1
1 TABLET ORAL DAILY
Qty: 100 TABLET | Refills: 0 | Status: SHIPPED | OUTPATIENT
Start: 2025-01-21 | End: 2025-05-01

## 2025-01-21 RX ORDER — LORATADINE 10 MG/1
10 TABLET ORAL DAILY
Qty: 100 TABLET | Refills: 0 | Status: SHIPPED | OUTPATIENT
Start: 2025-01-21 | End: 2025-05-01

## 2025-01-21 RX ORDER — EMPAGLIFLOZIN AND METFORMIN HYDROCHLORIDE 5; 500 MG/1; MG/1
2 TABLET ORAL 2 TIMES DAILY
Qty: 400 TABLET | Refills: 0 | Status: SHIPPED | OUTPATIENT
Start: 2025-01-21 | End: 2025-05-01

## 2025-01-21 RX ORDER — GABAPENTIN 300 MG/1
300 CAPSULE ORAL
Qty: 90 CAPSULE | Refills: 0 | Status: SHIPPED | OUTPATIENT
Start: 2025-01-21 | End: 2025-04-21

## 2025-01-21 RX ORDER — ASPIRIN 81 MG/1
81 TABLET, CHEWABLE ORAL DAILY
Qty: 100 TABLET | Refills: 0 | Status: SHIPPED | OUTPATIENT
Start: 2025-01-21 | End: 2025-05-01

## 2025-01-21 RX ORDER — ALBUTEROL SULFATE 90 UG/1
INHALANT RESPIRATORY (INHALATION)
COMMUNITY
Start: 2025-01-04

## 2025-01-21 RX ORDER — GABAPENTIN 100 MG/1
100 CAPSULE ORAL
Qty: 100 CAPSULE | Refills: 0 | Status: SHIPPED | OUTPATIENT
Start: 2025-01-21 | End: 2025-05-01

## 2025-01-21 RX ORDER — SEMAGLUTIDE 1.34 MG/ML
1 INJECTION, SOLUTION SUBCUTANEOUS
COMMUNITY
Start: 2024-11-30 | End: 2025-01-21

## 2025-01-21 SDOH — ECONOMIC STABILITY - INCOME SECURITY: FINANCIAL INSECURITY: Z59.86

## 2025-01-21 NOTE — ASSESSMENT & PLAN NOTE
Continue Lipitor and aspirin   Orders:    Comprehensive metabolic panel    CBC and differential    TSH, 3rd generation with Free T4 reflex    Lipid panel    UA/M w/rflx Culture, Routine    Albumin / creatinine urine ratio

## 2025-01-21 NOTE — ASSESSMENT & PLAN NOTE
Reviewed meds reinforced diet and exercise   Hydrate well   Her diet has not been the best   Lab Results   Component Value Date    HGBA1C 7.8 (A) 01/21/2025       Orders:    Comprehensive metabolic panel    CBC and differential    TSH, 3rd generation with Free T4 reflex    Lipid panel    UA/M w/rflx Culture, Routine    Albumin / creatinine urine ratio    gabapentin (NEURONTIN) 100 mg capsule; Take 1 capsule (100 mg total) by mouth daily with breakfast    Synjardy 5-500 MG TABS; Take 2 tablets by mouth 2 (two) times a day    semaglutide, 1 mg/dose, (Ozempic) 4 mg/3 mL injection pen; Inject 0.75 mL (1 mg total) under the skin once a week

## 2025-01-21 NOTE — ASSESSMENT & PLAN NOTE
Lab Results   Component Value Date    HGBA1C 7.8 (A) 01/21/2025       Orders:    Comprehensive metabolic panel    CBC and differential    TSH, 3rd generation with Free T4 reflex    Lipid panel    UA/M w/rflx Culture, Routine    Albumin / creatinine urine ratio    gabapentin (NEURONTIN) 100 mg capsule; Take 1 capsule (100 mg total) by mouth daily with breakfast    Synjardy 5-500 MG TABS; Take 2 tablets by mouth 2 (two) times a day    semaglutide, 1 mg/dose, (Ozempic) 4 mg/3 mL injection pen; Inject 0.75 mL (1 mg total) under the skin once a week

## 2025-01-21 NOTE — ASSESSMENT & PLAN NOTE
Continue Lipitor stable   Hyperlipidemia diagnoses assessed and discussed   Reviewed medications and plan of care   Labs reviewed   08/22/2024   Discussed low fat low cholesterol diet   Discussed exercise and adequate hydration   Will continue to monitor every 4 months       Orders:    Comprehensive metabolic panel    CBC and differential    TSH, 3rd generation with Free T4 reflex    Lipid panel    UA/M w/rflx Culture, Routine    atorvastatin (LIPITOR) 40 mg tablet; Take 1 tablet (40 mg total) by mouth daily

## 2025-01-21 NOTE — PROGRESS NOTES
Name: Theresa Bennett      : 1955      MRN: [id number]  Encounter Provider: JOCELIN Toscano  Encounter Date: 2025   Encounter department: [facility]  :  Assessment & Plan  Benign essential hypertension  Stable much improved   HTN assessed for stability and discussed plan of care   CrCl cannot be calculated (Patient's most recent lab result is older than the maximum 7 days allowed.).   Reviewed labs - CONTINUE DIOVAN/ HCTZ   No changes   We will continue to monitor    Orders:    Comprehensive metabolic panel    CBC and differential    TSH, 3rd generation with Free T4 reflex    Lipid panel    UA/M w/rflx Culture, Routine    Albumin / creatinine urine ratio    Mixed hyperlipidemia  Continue Lipitor stable   Hyperlipidemia diagnoses assessed and discussed   Reviewed medications and plan of care   Labs reviewed   2024   Discussed low fat low cholesterol diet   Discussed exercise and adequate hydration   Will continue to monitor every 4 months       Orders:    Comprehensive metabolic panel    CBC and differential    TSH, 3rd generation with Free T4 reflex    Lipid panel    UA/M w/rflx Culture, Routine    atorvastatin (LIPITOR) 40 mg tablet; Take 1 tablet (40 mg total) by mouth daily    Type 2 diabetes mellitus without complication, without long-term current use of insulin (HCC)  Stable   Diabetes assessed for stability and discussed plan of care   Reviewed medications and changes as needed   Reviewed labs and medications   Diet and adequate hydration reviewed   We will continue 4 month follow up surveillance  Reviewed Podiatry follow up   Reviewed  DM eye exam    Lab Results   Component Value Date    HGBA1C 7.8 (A) 2025       Orders:    Comprehensive metabolic panel    CBC and differential    TSH, 3rd generation with Free T4 reflex    Lipid panel    UA/M w/rflx Culture, Routine    Albumin / creatinine urine ratio    POCT hemoglobin A1c    DM type 2, not at  goal ([facility])  Reviewed meds reinforced diet and exercise   Hydrate well   Her diet has not been the best   Lab Results   Component Value Date    HGBA1C 7.8 (A) 01/21/2025       Orders:    Comprehensive metabolic panel    CBC and differential    TSH, 3rd generation with Free T4 reflex    Lipid panel    UA/M w/rflx Culture, Routine    Albumin / creatinine urine ratio    gabapentin (NEURONTIN) 100 mg capsule; Take 1 capsule (100 mg total) by mouth daily with breakfast    Synjardy 5-500 MG TABS; Take 2 tablets by mouth 2 (two) times a day    semaglutide, 1 mg/dose, (Ozempic) 4 mg/3 mL injection pen; Inject 0.75 mL (1 mg total) under the skin once a week    PAD (peripheral artery disease) ([facility])  Continue Lipitor and aspirin   Orders:    Comprehensive metabolic panel    CBC and differential    TSH, 3rd generation with Free T4 reflex    Lipid panel    UA/M w/rflx Culture, Routine    Albumin / creatinine urine ratio    Obesity (BMI 30-39.9)  Low fat diet   Ozempic discussed   Hydrate well   Increase activity as tolerated   Will need updated labs   We only did hba1c in office   Orders:    Comprehensive metabolic panel    CBC and differential    TSH, 3rd generation with Free T4 reflex    Lipid panel    UA/M w/rflx Culture, Routine    Albumin / creatinine urine ratio    Vitamin D deficiency  Continues supplement   Orders:    Vitamin D 25 hydroxy    Carotid stenosis, bilateral  Under care of Vascular   Continue lipitor and aspirin   Orders:    aspirin 81 mg chewable tablet; Chew 1 tablet (81 mg total) daily Please buy over the counter    History of lacunar cerebrovascular accident (CVA)  Stable does see Neurology last visit 7/2024 annual follow up   Orders:    gabapentin (Neurontin) 300 mg capsule; Take 1 capsule (300 mg total) by mouth daily at bedtime    Hypertension goal BP (blood pressure) < 130/80  Much improved   Orders:    valsartan-hydrochlorothiazide (DIOVAN-HCT) 160-25 MG per tablet; Take 1 tablet by mouth  daily    Hypokalemia  Stable   Orders:    potassium chloride (Klor-Con M10) 10 mEq tablet; Take 2 tablets (20 mEq total) by mouth daily    Seasonal allergies  Stable   Orders:    loratadine (CLARITIN) 10 mg tablet; Take 1 tablet (10 mg total) by mouth daily    Financial insecurity  Discussed difficulties with copay's and deductibles   Orders:    Ambulatory Referral to Financial Counseling Program; Future    Mammogram declined         Osteoporosis screening declined         Colon cancer screening declined               Depression Screening and Follow-up Plan: Patient was screened for depression during today's encounter. They screened negative with a PHQ-2 score of 0.    Falls Plan of Care: balance, strength, and gait training instructions were provided. Recommended assistive device to help with gait and balance. Medications that increase falls were reviewed. Assessed feet and footwear. Vitamin D supplementation was recommended. Home safety education provided.       History of Present Illness   Pt is a 69 yr old female   Presents in office 4 month follow up   CTA reviewed will need to see neurosurgery- discussed in office   - continues follow up with Neurology and vascular surgery   INTERNAL CAROTID ARTERIES: Moderate calcification of the intracranial internal carotid artery on the right most prominent within the proximal cavernous segment where there is moderate stenosis, in the range of 60 to 65%. Normal ophthalmic artery origin.   On the left the intracranial internal carotid artery is diminished in caliber and there is mild stenosis of the cavernous segment anteriorly. No occlusion.  HTN stable - much improved   But will need follow up labs - she did not have them done for today   Headaches are better   Overall feels well   DECLINES PREVENTATIVE MEDICINE   Discussed risks and benefits         Review of Systems   Constitutional:  Positive for fatigue. Negative for fever and unexpected weight change.   HENT:   "Negative for congestion, nosebleeds and sinus pressure.    Eyes: Negative.    Respiratory:  Negative for cough and shortness of breath.    Cardiovascular:  Negative for chest pain and palpitations.        HTN   Carotid stenosis- monitored by vascular     Gastrointestinal:  Negative for abdominal distention, abdominal pain, nausea and vomiting.   Endocrine:        Diabetes    Genitourinary:  Negative for difficulty urinating and flank pain.   Musculoskeletal:  Positive for myalgias. Negative for arthralgias and gait problem.        Gait abnormality  Balance issues and weakness  to left side   Did not get to see PT yet    Skin:  Negative for rash.   Allergic/Immunologic: Negative for environmental allergies.   Neurological:  Positive for weakness and light-headedness.        Here to discuss abnormal CTA - was seen by neurosurgery medical management - continues follow up with Neurology   Does have history of stroke   Hematological:  Negative for adenopathy.   Psychiatric/Behavioral:  Negative for sleep disturbance and suicidal ideas. The patient is nervous/anxious (improved).        Objective   /68 (BP Location: Left arm, Patient Position: Sitting, Cuff Size: Large)   Pulse 84   Temp 97.8 °F (36.6 °C)   Ht 5' 1\" (1.549 m)   Wt 81.6 kg (180 lb) Comment: as per patient  SpO2 97%   BMI 34.01 kg/m²      Physical Exam  Vitals and nursing note reviewed.   Constitutional:       Appearance: Normal appearance.   HENT:      Head: Atraumatic.      Nose: No congestion or rhinorrhea.   Eyes:      Extraocular Movements: Extraocular movements intact.   Cardiovascular:      Rate and Rhythm: Normal rate and regular rhythm.      Pulses: Normal pulses.      Heart sounds: Normal heart sounds.   Pulmonary:      Effort: Pulmonary effort is normal.      Breath sounds: Normal breath sounds.   Abdominal:      General: Bowel sounds are normal.      Palpations: Abdomen is soft.   Musculoskeletal:      Cervical back: Normal range of " motion.      Right lower leg: No edema.      Left lower leg: No edema.   Skin:     General: Skin is warm.      Capillary Refill: Capillary refill takes less than 2 seconds.   Neurological:      Mental Status: She is alert and oriented to person, place, and time.      Sensory: Sensory deficit present.      Motor: Weakness present.      Coordination: Coordination abnormal.      Comments: Slight left sided weakness   Seen by neurosurgery about abnormal Ct scan and was seen by neurology and vascular - no surgical interventions at this point    Psychiatric:         Mood and Affect: Mood normal.         Behavior: Behavior normal.       Administrative Statements   I have spent a total time of 45  minutes in caring for this patient on the day of the visit/encounter including Diagnostic results, Prognosis, Risks and benefits of tx options, Instructions for management, Patient and family education, Importance of tx compliance, Risk factor reductions, Impressions, Counseling / Coordination of care, Documenting in the medical record, Reviewing / ordering tests, medicine, procedures  , Obtaining or reviewing history  , and Communicating with other healthcare professionals . Topics discussed with the patient / family include symptom assessment and management, medication review, medication adjustment, and goals of care.

## 2025-01-21 NOTE — ASSESSMENT & PLAN NOTE
Much improved   Orders:    valsartan-hydrochlorothiazide (DIOVAN-HCT) 160-25 MG per tablet; Take 1 tablet by mouth daily

## 2025-01-21 NOTE — ASSESSMENT & PLAN NOTE
Stable   Diabetes assessed for stability and discussed plan of care   Reviewed medications and changes as needed   Reviewed labs and medications   Diet and adequate hydration reviewed   We will continue 4 month follow up surveillance  Reviewed Podiatry follow up   Reviewed  DM eye exam    Lab Results   Component Value Date    HGBA1C 7.8 (A) 01/21/2025       Orders:    Comprehensive metabolic panel    CBC and differential    TSH, 3rd generation with Free T4 reflex    Lipid panel    UA/M w/rflx Culture, Routine    Albumin / creatinine urine ratio    POCT hemoglobin A1c

## 2025-01-21 NOTE — ASSESSMENT & PLAN NOTE
Stable much improved   HTN assessed for stability and discussed plan of care   CrCl cannot be calculated (Patient's most recent lab result is older than the maximum 7 days allowed.).   Reviewed labs - CONTINUE DIOVAN/ HCTZ   No changes   We will continue to monitor    Orders:    Comprehensive metabolic panel    CBC and differential    TSH, 3rd generation with Free T4 reflex    Lipid panel    UA/M w/rflx Culture, Routine    Albumin / creatinine urine ratio

## 2025-01-22 ENCOUNTER — TELEPHONE (OUTPATIENT)
Dept: FAMILY MEDICINE CLINIC | Facility: CLINIC | Age: 70
End: 2025-01-22

## 2025-01-22 ENCOUNTER — RESULTS FOLLOW-UP (OUTPATIENT)
Dept: FAMILY MEDICINE CLINIC | Facility: CLINIC | Age: 70
End: 2025-01-22

## 2025-01-22 NOTE — TELEPHONE ENCOUNTER
PA started for : semaglutide, 1 mg/dose, (Ozempic) 4 mg/3 mL injection pen   Scan attached       Key : [id number]

## 2025-01-23 NOTE — TELEPHONE ENCOUNTER
PA for Ozempic 1 mg SUBMITTED to     via    [x]M-KEY: [id number]  []Surescripts-Case ID #   []Availity-Auth ID # NDC #   []Faxed to plan   []Other website   []Phone call Case ID #     [x]PA sent as URGENT    All office notes, labs and other pertaining documents and studies sent. Clinical questions answered. Awaiting determination from insurance company.     Turnaround time for your insurance to make a decision on your Prior Authorization can take 7-21 business days.

## 2025-04-22 ENCOUNTER — APPOINTMENT (OUTPATIENT)
Dept: LAB | Facility: CLINIC | Age: 70
End: 2025-04-22
Payer: COMMERCIAL

## 2025-04-22 ENCOUNTER — RESULTS FOLLOW-UP (OUTPATIENT)
Dept: FAMILY MEDICINE CLINIC | Facility: CLINIC | Age: 70
End: 2025-04-22

## 2025-04-22 DIAGNOSIS — R39.9 UTI SYMPTOMS: Primary | ICD-10-CM

## 2025-04-22 DIAGNOSIS — I10 BENIGN ESSENTIAL HYPERTENSION: Primary | ICD-10-CM

## 2025-04-22 LAB
25(OH)D3 SERPL-MCNC: 24.9 NG/ML (ref 30–100)
ALBUMIN SERPL BCG-MCNC: 4.1 G/DL (ref 3.5–5)
ALP SERPL-CCNC: 102 U/L (ref 34–104)
ALT SERPL W P-5'-P-CCNC: 27 U/L (ref 7–52)
ANION GAP SERPL CALCULATED.3IONS-SCNC: 13 MMOL/L (ref 4–13)
AST SERPL W P-5'-P-CCNC: 20 U/L (ref 13–39)
BACTERIA UR QL AUTO: ABNORMAL /HPF
BASOPHILS # BLD AUTO: 0.05 THOUSANDS/ÂΜL (ref 0–0.1)
BASOPHILS NFR BLD AUTO: 1 % (ref 0–1)
BILIRUB SERPL-MCNC: 0.74 MG/DL (ref 0.2–1)
BILIRUB UR QL STRIP: NEGATIVE
BUN SERPL-MCNC: 16 MG/DL (ref 5–25)
CALCIUM SERPL-MCNC: 9.8 MG/DL (ref 8.4–10.2)
CHLORIDE SERPL-SCNC: 99 MMOL/L (ref 96–108)
CHOLEST SERPL-MCNC: 280 MG/DL (ref ?–200)
CLARITY UR: CLEAR
CO2 SERPL-SCNC: 28 MMOL/L (ref 21–32)
COLOR UR: ABNORMAL
CREAT SERPL-MCNC: 0.94 MG/DL (ref 0.6–1.3)
CREAT UR-MCNC: 68.5 MG/DL
EOSINOPHIL # BLD AUTO: 0.16 THOUSAND/ÂΜL (ref 0–0.61)
EOSINOPHIL NFR BLD AUTO: 3 % (ref 0–6)
ERYTHROCYTE [DISTWIDTH] IN BLOOD BY AUTOMATED COUNT: 15.6 % (ref 11.6–15.1)
GFR SERPL CREATININE-BSD FRML MDRD: 62 ML/MIN/1.73SQ M
GLUCOSE P FAST SERPL-MCNC: 169 MG/DL (ref 65–99)
GLUCOSE UR STRIP-MCNC: ABNORMAL MG/DL
HCT VFR BLD AUTO: 44.4 % (ref 34.8–46.1)
HDLC SERPL-MCNC: 42 MG/DL
HGB BLD-MCNC: 13.4 G/DL (ref 11.5–15.4)
HGB UR QL STRIP.AUTO: NEGATIVE
IMM GRANULOCYTES # BLD AUTO: 0.02 THOUSAND/UL (ref 0–0.2)
IMM GRANULOCYTES NFR BLD AUTO: 0 % (ref 0–2)
KETONES UR STRIP-MCNC: NEGATIVE MG/DL
LDLC SERPL CALC-MCNC: 210 MG/DL (ref 0–100)
LEUKOCYTE ESTERASE UR QL STRIP: ABNORMAL
LYMPHOCYTES # BLD AUTO: 2.18 THOUSANDS/ÂΜL (ref 0.6–4.47)
LYMPHOCYTES NFR BLD AUTO: 36 % (ref 14–44)
MCH RBC QN AUTO: 25.9 PG (ref 26.8–34.3)
MCHC RBC AUTO-ENTMCNC: 30.2 G/DL (ref 31.4–37.4)
MCV RBC AUTO: 86 FL (ref 82–98)
MICROALBUMIN UR-MCNC: 11.4 MG/L
MICROALBUMIN/CREAT 24H UR: 17 MG/G CREATININE (ref 0–30)
MONOCYTES # BLD AUTO: 0.61 THOUSAND/ÂΜL (ref 0.17–1.22)
MONOCYTES NFR BLD AUTO: 10 % (ref 4–12)
NEUTROPHILS # BLD AUTO: 2.98 THOUSANDS/ÂΜL (ref 1.85–7.62)
NEUTS SEG NFR BLD AUTO: 50 % (ref 43–75)
NITRITE UR QL STRIP: POSITIVE
NON-SQ EPI CELLS URNS QL MICRO: ABNORMAL /HPF
NONHDLC SERPL-MCNC: 238 MG/DL
NRBC BLD AUTO-RTO: 0 /100 WBCS
PH UR STRIP.AUTO: 5.5 [PH]
PLATELET # BLD AUTO: 299 THOUSANDS/UL (ref 149–390)
PMV BLD AUTO: 9.9 FL (ref 8.9–12.7)
POTASSIUM SERPL-SCNC: 3.2 MMOL/L (ref 3.5–5.3)
PROT SERPL-MCNC: 7.1 G/DL (ref 6.4–8.4)
PROT UR STRIP-MCNC: NEGATIVE MG/DL
RBC # BLD AUTO: 5.17 MILLION/UL (ref 3.81–5.12)
RBC #/AREA URNS AUTO: ABNORMAL /HPF
SODIUM SERPL-SCNC: 140 MMOL/L (ref 135–147)
SP GR UR STRIP.AUTO: 1.01 (ref 1–1.03)
TRIGL SERPL-MCNC: 139 MG/DL (ref ?–150)
TSH SERPL DL<=0.05 MIU/L-ACNC: 2.61 UIU/ML (ref 0.45–4.5)
UROBILINOGEN UR STRIP-ACNC: <2 MG/DL
WBC # BLD AUTO: 6 THOUSAND/UL (ref 4.31–10.16)
WBC #/AREA URNS AUTO: ABNORMAL /HPF

## 2025-04-22 PROCEDURE — 87077 CULTURE AEROBIC IDENTIFY: CPT

## 2025-04-22 PROCEDURE — 81001 URINALYSIS AUTO W/SCOPE: CPT

## 2025-04-22 PROCEDURE — 87186 SC STD MICRODIL/AGAR DIL: CPT

## 2025-04-22 PROCEDURE — 87086 URINE CULTURE/COLONY COUNT: CPT

## 2025-04-22 RX ORDER — CEPHALEXIN 500 MG/1
500 CAPSULE ORAL EVERY 6 HOURS SCHEDULED
Qty: 20 CAPSULE | Refills: 0 | Status: SHIPPED | OUTPATIENT
Start: 2025-04-22 | End: 2025-04-23 | Stop reason: SDUPTHER

## 2025-04-23 ENCOUNTER — OFFICE VISIT (OUTPATIENT)
Dept: FAMILY MEDICINE CLINIC | Facility: CLINIC | Age: 70
End: 2025-04-23
Payer: COMMERCIAL

## 2025-04-23 VITALS
HEIGHT: 61 IN | WEIGHT: 180 LBS | OXYGEN SATURATION: 97 % | BODY MASS INDEX: 33.99 KG/M2 | TEMPERATURE: 97.5 F | DIASTOLIC BLOOD PRESSURE: 60 MMHG | SYSTOLIC BLOOD PRESSURE: 110 MMHG | HEART RATE: 92 BPM

## 2025-04-23 DIAGNOSIS — J30.2 SEASONAL ALLERGIES: ICD-10-CM

## 2025-04-23 DIAGNOSIS — E11.36 TYPE 2 DIABETES MELLITUS WITH DIABETIC CATARACT, WITHOUT LONG-TERM CURRENT USE OF INSULIN (HCC): ICD-10-CM

## 2025-04-23 DIAGNOSIS — E11.9 DM TYPE 2, NOT AT GOAL (HCC): Primary | ICD-10-CM

## 2025-04-23 DIAGNOSIS — E78.2 MIXED HYPERLIPIDEMIA: ICD-10-CM

## 2025-04-23 DIAGNOSIS — Z86.73 HISTORY OF LACUNAR CEREBROVASCULAR ACCIDENT (CVA): ICD-10-CM

## 2025-04-23 DIAGNOSIS — E87.6 HYPOKALEMIA: ICD-10-CM

## 2025-04-23 DIAGNOSIS — Z53.20 COLON CANCER SCREENING DECLINED: ICD-10-CM

## 2025-04-23 DIAGNOSIS — I65.23 CAROTID STENOSIS, BILATERAL: ICD-10-CM

## 2025-04-23 DIAGNOSIS — Z53.20 OSTEOPOROSIS SCREENING DECLINED: ICD-10-CM

## 2025-04-23 DIAGNOSIS — Z53.20 BREAST CANCER SCREENING DECLINED: ICD-10-CM

## 2025-04-23 DIAGNOSIS — R39.9 UTI SYMPTOMS: ICD-10-CM

## 2025-04-23 DIAGNOSIS — E55.9 VITAMIN D DEFICIENCY: ICD-10-CM

## 2025-04-23 PROBLEM — E11.69 HYPERLIPIDEMIA DUE TO TYPE 2 DIABETES MELLITUS  (HCC): Status: ACTIVE | Noted: 2025-04-17

## 2025-04-23 PROBLEM — E11.65 TYPE 2 DIABETES MELLITUS WITH HYPERGLYCEMIA, WITHOUT LONG-TERM CURRENT USE OF INSULIN (HCC): Status: ACTIVE | Noted: 2025-04-17

## 2025-04-23 PROBLEM — E78.5 HYPERLIPIDEMIA DUE TO TYPE 2 DIABETES MELLITUS  (HCC): Status: ACTIVE | Noted: 2025-04-17

## 2025-04-23 PROCEDURE — 99214 OFFICE O/P EST MOD 30 MIN: CPT | Performed by: NURSE PRACTITIONER

## 2025-04-23 RX ORDER — EMPAGLIFLOZIN AND METFORMIN HYDROCHLORIDE 12.5; 1 MG/1; MG/1
1 TABLET ORAL 2 TIMES DAILY
Qty: 180 TABLET | Refills: 0 | Status: SHIPPED | OUTPATIENT
Start: 2025-04-23 | End: 2025-07-22

## 2025-04-23 RX ORDER — CEPHALEXIN 500 MG/1
500 CAPSULE ORAL EVERY 6 HOURS SCHEDULED
Qty: 20 CAPSULE | Refills: 0 | Status: SHIPPED | OUTPATIENT
Start: 2025-04-23 | End: 2025-04-25 | Stop reason: SDUPTHER

## 2025-04-23 RX ORDER — ASPIRIN 81 MG/1
81 TABLET, CHEWABLE ORAL DAILY
Qty: 100 TABLET | Refills: 0 | Status: SHIPPED | OUTPATIENT
Start: 2025-04-23 | End: 2025-08-01

## 2025-04-23 RX ORDER — ATORVASTATIN CALCIUM 40 MG/1
40 TABLET, FILM COATED ORAL DAILY
Qty: 100 TABLET | Refills: 0 | Status: SHIPPED | OUTPATIENT
Start: 2025-04-23 | End: 2025-08-01

## 2025-04-23 RX ORDER — GABAPENTIN 100 MG/1
100 CAPSULE ORAL
Qty: 100 CAPSULE | Refills: 0 | Status: SHIPPED | OUTPATIENT
Start: 2025-04-23 | End: 2025-08-01

## 2025-04-23 RX ORDER — POTASSIUM CHLORIDE 750 MG/1
10 TABLET, EXTENDED RELEASE ORAL 2 TIMES DAILY
Qty: 200 TABLET | Refills: 0 | Status: SHIPPED | OUTPATIENT
Start: 2025-04-23 | End: 2025-08-01

## 2025-04-23 RX ORDER — GABAPENTIN 300 MG/1
300 CAPSULE ORAL
Qty: 90 CAPSULE | Refills: 0 | Status: SHIPPED | OUTPATIENT
Start: 2025-04-23 | End: 2025-07-22

## 2025-04-23 RX ORDER — LORATADINE 10 MG/1
10 TABLET ORAL DAILY
Qty: 100 TABLET | Refills: 0 | Status: SHIPPED | OUTPATIENT
Start: 2025-04-23 | End: 2025-08-01

## 2025-04-23 RX ORDER — EMPAGLIFLOZIN AND METFORMIN HYDROCHLORIDE 5; 500 MG/1; MG/1
2 TABLET ORAL 2 TIMES DAILY
Qty: 400 TABLET | Refills: 0 | Status: SHIPPED | OUTPATIENT
Start: 2025-04-23 | End: 2025-04-23

## 2025-04-23 NOTE — RESULT ENCOUNTER NOTE
Has a UTI   Sending over antibiotic  that also can cause low potassium as bacteria love the potassium   So I will be sending over and antibiotic

## 2025-04-23 NOTE — PROGRESS NOTES
Name: Theresa Bennett      : 1955      MRN: [id number]  Encounter Provider: JOCELIN Toscano  Encounter Date: 2025   Encounter department: [facility]  :  Assessment & Plan  DM type 2, not at goal (HCC)  Under care of endocrinology  Reviewed medications and adjustments noted  Discussed low-fat diabetic diet  Follow-up with labs in 3 months  Gabapentin for neuropathy secondary to diabetes and recent history of stroke  Lab Results   Component Value Date    HGBA1C 7.8 (A) 2025       Orders:    gabapentin (NEURONTIN) 100 mg capsule; Take 1 capsule (100 mg total) by mouth daily with breakfast    Comprehensive metabolic panel    CBC and differential    Lipid panel    Vitamin D 25 hydroxy    Hemoglobin A1C    UA (URINE) with reflex to Scope    Cytology, urine    Empagliflozin-metFORMIN HCl (Synjardy) 12.5-1000 MG TABS; Take 1 tablet by mouth 2 (two) times a day    Carotid stenosis, bilateral  Was seen by neurosurgery vascular specialist  Will continue to monitor at this point  Continue aspirin 81 mg  Continue cholesterol statin management  And blood pressure management  Orders:    aspirin 81 mg chewable tablet; Chew 1 tablet (81 mg total) daily Please buy over the counter    Cytology, urine    Hypokalemia  Recurrent issue with hypokalemia  She has been taking her potassium 1 tablet daily instead of 2 so I changed it to 1 tablet twice a day  She also has a UTI which could cause hypokalemia  Will adjust the potassium pills to treat the urinary tract infection  Discussed reportable signs and symptoms of hyper and hypokalemia  Follow-up labs in 3 months  Orders:    potassium chloride (Klor-Con M10) 10 mEq tablet; Take 1 tablet (10 mEq total) by mouth 2 (two) times a day    Comprehensive metabolic panel    CBC and differential    Lipid panel    Vitamin D 25 hydroxy    Hemoglobin A1C    UA (URINE) with reflex to Scope    Cytology, urine    Mixed  hyperlipidemia  Hyperlipidemia not at control  She has not been taking her statin daily as she should  Discuss concerns with history of diabetes history of strokes and hypertension her cholesterol should be well-managed and right now it is not.  She will resume medications low-fat low-cholesterol diet  Orders:    atorvastatin (LIPITOR) 40 mg tablet; Take 1 tablet (40 mg total) by mouth daily    Comprehensive metabolic panel    CBC and differential    Lipid panel    Vitamin D 25 hydroxy    Hemoglobin A1C    UA (URINE) with reflex to Scope    Cytology, urine    Seasonal allergies  Stable   Orders:    loratadine (CLARITIN) 10 mg tablet; Take 1 tablet (10 mg total) by mouth daily    History of lacunar cerebrovascular accident (CVA)  Follow-up with neurology in July  Neuropathy continues to be an issue but better controlled with gabapentin  Orders:    gabapentin (Neurontin) 300 mg capsule; Take 1 capsule (300 mg total) by mouth daily at bedtime    Vitamin D deficiency    Orders:    Vitamin D 25 hydroxy    UTI symptoms  Discussed treatment options / discussed reportable s/s   Urinate after sexual activity. Stay well hydrated. Take showers instead of baths. Minimize douching, sprays or powders in the genital area. 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    If continued fevers chills rigors flank pain or blood in urine -> ER    Orders:    cephalexin (KEFLEX) 500 mg capsule; Take 1 capsule (500 mg total) by mouth every 6 (six) hours for 5 days    Colon cancer screening declined  Discussed risk versus benefits       Breast cancer screening declined  Discussed risk versus benefits       Osteoporosis screening declined  Discussed risk versus benefits       Type 2 diabetes mellitus with diabetic cataract, without long-term current use of insulin (HCC)    Lab Results   Component Value Date    HGBA1C 7.8 (A) 01/21/2025                  Depression Screening and Follow-up Plan: Patient was screened for depression  during today's encounter. They screened negative with a PHQ-2 score of 0.      Falls Plan of Care: balance, strength, and gait training instructions were provided. Recommended assistive device to help with gait and balance. Medications that increase falls were reviewed. Assessed feet and footwear. Vitamin D supplementation was recommended. Home safety education provided.       History of Present Illness   Pt is a 69 yr old female   Presents in office 4 month follow up   CTA reviewed will need to see neurosurgery- discussed in office   - continues follow up with Neurology and vascular surgery   INTERNAL CAROTID ARTERIES: Moderate calcification of the intracranial internal carotid artery on the right most prominent within the proximal cavernous segment where there is moderate stenosis, in the range of 60 to 65%. Normal ophthalmic artery origin.   Hyperlipidemia not at goal compliance issues with meds   On the left the intracranial internal carotid artery is diminished in caliber and there is mild stenosis of the cavernous segment anteriorly. No occlusion.  HTN stable - much improved   Headaches are better   Overall feels well   DECLINES PREVENTATIVE MEDICINE   Discussed risks and benefits         Review of Systems   Constitutional:  Positive for fatigue. Negative for fever and unexpected weight change.   HENT:  Negative for congestion, nosebleeds and sinus pressure.    Eyes: Negative.    Respiratory:  Negative for cough, shortness of breath and wheezing.    Cardiovascular:  Negative for chest pain and palpitations.        HTN   Carotid stenosis- monitored by vascular    Hyperlipidemia not at goal  She was not she has not been compliant with her medications daily     Gastrointestinal:  Negative for abdominal distention, abdominal pain, nausea and vomiting.   Endocrine:        Diabetes    Genitourinary:  Negative for difficulty urinating and flank pain.   Musculoskeletal:  Positive for myalgias. Negative for arthralgias  "and gait problem.        Gait abnormality  Balance issues and weakness  to left side   Did not get to see PT yet    Skin:  Negative for rash.   Allergic/Immunologic: Negative for environmental allergies.   Neurological:  Positive for weakness and light-headedness.        Here to discuss abnormal CTA - was seen by neurosurgery medical management - continues follow up with Neurology   Does have history of stroke   Hematological:  Negative for adenopathy.   Psychiatric/Behavioral:  Negative for sleep disturbance and suicidal ideas. The patient is nervous/anxious (improved).        Objective   /60 (BP Location: Left arm, Patient Position: Sitting, Cuff Size: Large)   Pulse 92   Temp 97.5 °F (36.4 °C)   Ht 5' 1\" (1.549 m)   Wt 81.6 kg (180 lb)   SpO2 97%   BMI 34.01 kg/m²      Physical Exam  Vitals and nursing note reviewed.   Constitutional:       Appearance: Normal appearance.   HENT:      Head: Atraumatic.      Nose: No congestion or rhinorrhea.   Eyes:      Extraocular Movements: Extraocular movements intact.   Cardiovascular:      Rate and Rhythm: Normal rate and regular rhythm.      Pulses: Normal pulses.      Heart sounds: Normal heart sounds.   Pulmonary:      Effort: Pulmonary effort is normal.      Breath sounds: Normal breath sounds.   Abdominal:      General: Bowel sounds are normal.      Palpations: Abdomen is soft.   Musculoskeletal:      Cervical back: Normal range of motion.      Right lower leg: No edema.      Left lower leg: No edema.   Skin:     General: Skin is warm.      Capillary Refill: Capillary refill takes less than 2 seconds.   Neurological:      Mental Status: She is alert and oriented to person, place, and time.      Sensory: Sensory deficit present.      Motor: Weakness present.      Coordination: Coordination abnormal.      Comments: Slight left sided weakness   Seen by neurosurgery about abnormal Ct scan and was seen by neurology and vascular - no surgical interventions at this " point    Psychiatric:         Mood and Affect: Mood normal.         Behavior: Behavior normal.       Component  Ref Range & Units (hover) 4/22/25  7:18 AM 1/20/25 10:08 AM 8/22/24 11:18 AM 12/9/23  4:49 AM 12/8/23 10:06 AM 2/13/23  8:22 AM 7/11/22  8:44 AM   Sodium 140 140 141 137 139 139 R 140 R   Potassium 3.2 Low  3.7 3.2 Low  3.7 4.3 4.0 R 3.3 Low  R   Chloride 99 98 99 105 104 98 R 99 R   CO2 28 30 32 24 26 28 R 27 R   ANION GAP 13 12 10 8 R 9 R 13 R 14 R   BUN 16  11 16 11 9 R 14 R   Creatinine 0.94  0.80 CM 0.78 CM 0.83 CM 0.9 R 0.7 R   Comment: Standardized to IDMS reference method   Glucose, Fasting 169 High   148 High  164 High       Calcium 9.8  9.5 8.9 9.4 9.9 9.7   AST 20     13 R 14 R   ALT 27     14 R 22 R   Comment: Specimen collection should occur prior to Sulfasalazine administration due to the potential for falsely depressed results.   Alkaline Phosphatase 102     94 R 73 R   Total Protein 7.1     7.0 R 6.9 R   Albumin 4.1     4.7 R 4.4 R   Total Bilirubin 0.74     0.6 R 0.6 R   Comment: Use of this assay is not recommended for patients undergoing treatment with eltrombopag due to the potential for falsely elevated results.  N-acetyl-p-benzoquinone imine (metabolite of Acetaminophen) will generate erroneously low results in samples for patients that have taken an overdose of Acetaminophen.   eGFR 62  75 78 72 72 R, CM 89 R, CM     Component  Ref Range & Units (hover) 4/22/25  7:18 AM 8/22/24 11:18 AM 12/9/23  4:49 AM 12/8/23 10:06 AM   WBC 6.00 7.07 7.03 6.82   RBC 5.17 High  4.98 4.83 5.52 High    Hemoglobin 13.4 12.9 12.4 14.0   Hematocrit 44.4 43.8 39.9 45.8   MCV 86 88 83 83   MCH 25.9 Low  25.9 Low  25.7 Low  25.4 Low    MCHC 30.2 Low  29.5 Low  31.1 Low  30.6 Low    RDW 15.6 High  14.7 14.1 14.1   MPV 9.9 10.1 9.7 9.8   Platelets 299 323 297 353   nRBC 0 0 0    Segmented % 50 58 55    Immature Grans % 0 0 0    Lymphocytes % 36 31 34    Monocytes % 10 8 7    Eosinophils Relative 3 2 3     Basophils Relative 1 1 1    Absolute Neutrophils 2.98 4.07 3.85    Absolute Immature Grans 0.02 0.02 0.02    Absolute Lymphocytes 2.18 2.20 2.39    Absolute Monocytes 0.61 0.58 0.51    Eosinophils Absolute 0.16 0.14 0.19    Basophils Absolute 0.05 0.06 0.07              Specimen Collected: 04/22/25  7:18 AM   TSH, 3rd generation with Free T4 reflex  Order: [id number]   Status: Final result       Next appt: 05/23/2025 at 01:00 PM in Family Medicine (JOCELIN Toscano)       Dx: Benign essential hypertension; Mixed ...    Test Result Released: Yes (not seen)    0 Result Notes       View Follow-Up Encounter      Component  Ref Range & Units (hover) 4/22/25  7:18 AM   TSH 3RD GENERATON 2.611   Comment: The recommended reference ranges for TSH during pregnancy are as follows:  First trimester 0.100 to 2.500 uIU/mL  Second trimester  0.200 to 3.000 uIU/mL  Third trimester 0.300 to 3.000 uIU/m    Note: Normal ranges may not apply to patients who are transgender, non-binary, or whose legal sex, sex at birth, and gender identity differ.  Adult TSH (3rd generation) reference range follows the recommended guidelines of the American Thyroid Association, January, 2020.             Specimen Collected: 04/22/25  7:18 AM Last Resulted: 04/22/25  7:17 PM     Component  Ref Range & Units (hover) 4/22/25  7:18 AM 8/22/24 11:18 AM 12/9/23  4:49 AM   Cholesterol 280 High  166  High  CM   Comment: Cholesterol:        Pediatric <18 Years        Desirable          <170 mg/dL      Borderline High    170-199 mg/dL      High               >=200 mg/dL        Adult >=18 Years           Desirable         <200 mg/dL      Borderline High   200-239 mg/dL      High             >239 mg/dL   Triglycerides 139 89  High  CM   Comment: Triglyceride:     0-9Y            <75mg/dL     10Y-17Y         <90 mg/dL       >=18Y     Normal          <150 mg/dL     Borderline High 150-199 mg/dL     High            200-499 mg/dL       Very High        >499 mg/dL    Specimen collection should occur prior to Metamizole administration due to the potential for falsely depressed results.   HDL, Direct 42 Low  39 Low  40 Low    LDL Calculated 210 High  109 High   High  CM   Comment: LDL Cholesterol:    Optimal           <100 mg/dl    Near Optimal      100-129 mg/dl    Above Optimal      Borderline High 130-159 mg/dl      High            160-189 mg/dl      Very High       >189 mg/dl         This screening LDL is a calculated result.  It does not have the accuracy of the Direct Measured LDL in the monitoring of patients with hyperlipidemia and/or statin therapy.  Direct Measure LDL ([id number]) must be ordered separately in these patients.   Non-HDL-Chol (CHOL-HDL) 238 127              Specimen Collected: 04/22/25  7:18 AM Last Resulted: 04/22/25  8:25 PM         Component  Ref Range & Units (hover) 4/22/25  7:18 AM   Vit D, 25-Hydroxy 24.9 Low    Comment: Vitamin D guidelines established by Clinical Guidelines Subcommittee  of the Endocrine Society Task Force, 2011    Deficiency <20ng/ml  Insufficiency 20-30ng/ml  Sufficient  ng/ml             Specimen Collected: 04/22/25  7:18 AM          Component  Ref Range & Units (hover) 4/22/25  7:18 AM 4/25/23  3:26 PM   Color, UA Light Yellow Yellow   Clarity, UA Clear Turbid   Specific Gravity, UA 1.013 1.015 R   Comment: High concentrations of glucose or protein may affect the specific gravity   pH, UA 5.5 5.0 R   Leukocytes, UA Moderate Abnormal  Negative R   Nitrite, UA Positive Abnormal  Positive R   Protein, UA Negative Negative R   Glucose, UA >=1000 (1%) Abnormal  2,000 R   Comment: Elevated glucose may cause false negative leukocyte esterase   Ketones, UA Negative 15 R   Urobilinogen, UA <2.0    Bilirubin, UA Negative    Occult Blood, UA Negative Negative R             Specimen Collected: 04/22/25  7:18 AM Last Resulted: 04/22/25  8:11 PM     Administrative Statements   I have spent a total time of 45  minutes  "in caring for this patient on the day of the visit/encounter including Diagnostic results, Prognosis, Risks and benefits of tx options, Instructions for management, Patient and family education, Importance of tx compliance, Risk factor reductions, Impressions, Counseling / Coordination of care, Documenting in the medical record, Reviewing/placing orders in the medical record (including tests, medications, and/or procedures), Obtaining or reviewing history  , and Communicating with other healthcare professionals .    Portions of the record may have been created with voice recognition software.  Occasional wrong word or \"sound a like\" substitutions may have occurred due to the inherent limitations of voice recognition software.  Read the chart carefully and recognize, using context, where substitutions have occurred   "

## 2025-04-23 NOTE — RESULT ENCOUNTER NOTE
Potassium is low has she been taking her potassium as discussed in the past twice a day ? If not she should start and if she is taking it let me know we will have to increase the dose to 20 meq twice a day   Cholesterol is elevated  low fat low cholesterol diet   She is to keep appt for follow up to discuss

## 2025-04-23 NOTE — ASSESSMENT & PLAN NOTE
Hyperlipidemia not at control  She has not been taking her statin daily as she should  Discuss concerns with history of diabetes history of strokes and hypertension her cholesterol should be well-managed and right now it is not.  She will resume medications low-fat low-cholesterol diet  Orders:    atorvastatin (LIPITOR) 40 mg tablet; Take 1 tablet (40 mg total) by mouth daily    Comprehensive metabolic panel    CBC and differential    Lipid panel    Vitamin D 25 hydroxy    Hemoglobin A1C    UA (URINE) with reflex to Scope    Cytology, urine

## 2025-04-24 ENCOUNTER — TELEPHONE (OUTPATIENT)
Age: 70
End: 2025-04-24

## 2025-04-24 DIAGNOSIS — R39.9 UTI SYMPTOMS: ICD-10-CM

## 2025-04-24 NOTE — TELEPHONE ENCOUNTER
Patient says that Lucinda was supposed to send in an antibiotic to treat her UTI but it was never sent to her pharmacy.  Please advise.  Thank you.

## 2025-04-25 LAB
BACTERIA UR CULT: ABNORMAL
BACTERIA UR CULT: ABNORMAL

## 2025-04-25 RX ORDER — CEPHALEXIN 500 MG/1
500 CAPSULE ORAL EVERY 6 HOURS SCHEDULED
Qty: 20 CAPSULE | Refills: 0 | Status: SHIPPED | OUTPATIENT
Start: 2025-04-25 | End: 2025-04-30

## 2025-04-29 NOTE — TELEPHONE ENCOUNTER
Patient wants to know if you want her to have another UA done after abx to see if infection is gone

## 2025-05-05 ENCOUNTER — APPOINTMENT (EMERGENCY)
Dept: VASCULAR ULTRASOUND | Facility: HOSPITAL | Age: 70
End: 2025-05-05
Payer: COMMERCIAL

## 2025-05-05 ENCOUNTER — APPOINTMENT (EMERGENCY)
Dept: CT IMAGING | Facility: HOSPITAL | Age: 70
End: 2025-05-05
Payer: COMMERCIAL

## 2025-05-05 ENCOUNTER — APPOINTMENT (EMERGENCY)
Dept: RADIOLOGY | Facility: HOSPITAL | Age: 70
End: 2025-05-05
Payer: COMMERCIAL

## 2025-05-05 ENCOUNTER — OFFICE VISIT (OUTPATIENT)
Dept: URGENT CARE | Facility: CLINIC | Age: 70
End: 2025-05-05
Payer: COMMERCIAL

## 2025-05-05 ENCOUNTER — HOSPITAL ENCOUNTER (EMERGENCY)
Facility: HOSPITAL | Age: 70
Discharge: HOME | End: 2025-05-05
Payer: COMMERCIAL

## 2025-05-05 VITALS
OXYGEN SATURATION: 95 % | WEIGHT: 186 LBS | BODY MASS INDEX: 35.14 KG/M2 | TEMPERATURE: 98.3 F | SYSTOLIC BLOOD PRESSURE: 132 MMHG | DIASTOLIC BLOOD PRESSURE: 82 MMHG | HEART RATE: 87 BPM | RESPIRATION RATE: 19 BRPM

## 2025-05-05 VITALS
HEART RATE: 85 BPM | OXYGEN SATURATION: 96 % | RESPIRATION RATE: 18 BRPM | TEMPERATURE: 98 F | DIASTOLIC BLOOD PRESSURE: 67 MMHG | SYSTOLIC BLOOD PRESSURE: 155 MMHG

## 2025-05-05 DIAGNOSIS — R20.2 PARESTHESIA: Primary | ICD-10-CM

## 2025-05-05 DIAGNOSIS — M79.89 LEFT LEG SWELLING: Primary | ICD-10-CM

## 2025-05-05 DIAGNOSIS — R68.84 JAW PAIN: ICD-10-CM

## 2025-05-05 LAB
2HR DELTA HS TROPONIN: 0 NG/L
ALBUMIN SERPL BCG-MCNC: 4 G/DL (ref 3.5–5)
ALP SERPL-CCNC: 99 U/L (ref 34–104)
ALT SERPL W P-5'-P-CCNC: 13 U/L (ref 7–52)
ANION GAP SERPL CALCULATED.3IONS-SCNC: 10 MMOL/L (ref 4–13)
AST SERPL W P-5'-P-CCNC: 11 U/L (ref 13–39)
ATRIAL RATE: 74 BPM
ATRIAL RATE: 83 BPM
BASOPHILS # BLD AUTO: 0.09 THOUSANDS/ÂΜL (ref 0–0.1)
BASOPHILS NFR BLD AUTO: 1 % (ref 0–1)
BILIRUB SERPL-MCNC: 0.68 MG/DL (ref 0.2–1)
BNP SERPL-MCNC: 24 PG/ML (ref 0–100)
BUN SERPL-MCNC: 14 MG/DL (ref 5–25)
CALCIUM SERPL-MCNC: 9.5 MG/DL (ref 8.4–10.2)
CARDIAC TROPONIN I PNL SERPL HS: 3 NG/L (ref ?–50)
CARDIAC TROPONIN I PNL SERPL HS: 3 NG/L (ref ?–50)
CHLORIDE SERPL-SCNC: 102 MMOL/L (ref 96–108)
CO2 SERPL-SCNC: 27 MMOL/L (ref 21–32)
CREAT SERPL-MCNC: 0.82 MG/DL (ref 0.6–1.3)
D DIMER PPP FEU-MCNC: <0.27 UG/ML FEU
EOSINOPHIL # BLD AUTO: 0.15 THOUSAND/ÂΜL (ref 0–0.61)
EOSINOPHIL NFR BLD AUTO: 2 % (ref 0–6)
ERYTHROCYTE [DISTWIDTH] IN BLOOD BY AUTOMATED COUNT: 15 % (ref 11.6–15.1)
GFR SERPL CREATININE-BSD FRML MDRD: 73 ML/MIN/1.73SQ M
GLUCOSE SERPL-MCNC: 210 MG/DL (ref 65–140)
HCT VFR BLD AUTO: 41.6 % (ref 34.8–46.1)
HGB BLD-MCNC: 13 G/DL (ref 11.5–15.4)
IMM GRANULOCYTES # BLD AUTO: 0.03 THOUSAND/UL (ref 0–0.2)
IMM GRANULOCYTES NFR BLD AUTO: 0 % (ref 0–2)
LYMPHOCYTES # BLD AUTO: 2.24 THOUSANDS/ÂΜL (ref 0.6–4.47)
LYMPHOCYTES NFR BLD AUTO: 25 % (ref 14–44)
MCH RBC QN AUTO: 25.8 PG (ref 26.8–34.3)
MCHC RBC AUTO-ENTMCNC: 31.3 G/DL (ref 31.4–37.4)
MCV RBC AUTO: 83 FL (ref 82–98)
MONOCYTES # BLD AUTO: 0.53 THOUSAND/ÂΜL (ref 0.17–1.22)
MONOCYTES NFR BLD AUTO: 6 % (ref 4–12)
NEUTROPHILS # BLD AUTO: 5.97 THOUSANDS/ÂΜL (ref 1.85–7.62)
NEUTS SEG NFR BLD AUTO: 66 % (ref 43–75)
NRBC BLD AUTO-RTO: 0 /100 WBCS
P AXIS: 52 DEGREES
P AXIS: 65 DEGREES
PLATELET # BLD AUTO: 303 THOUSANDS/UL (ref 149–390)
PMV BLD AUTO: 9.6 FL (ref 8.9–12.7)
POTASSIUM SERPL-SCNC: 3.7 MMOL/L (ref 3.5–5.3)
PR INTERVAL: 196 MS
PR INTERVAL: 202 MS
PROT SERPL-MCNC: 7.2 G/DL (ref 6.4–8.4)
QRS AXIS: 74 DEGREES
QRS AXIS: 95 DEGREES
QRSD INTERVAL: 82 MS
QRSD INTERVAL: 82 MS
QT INTERVAL: 370 MS
QT INTERVAL: 408 MS
QTC INTERVAL: 434 MS
QTC INTERVAL: 453 MS
RBC # BLD AUTO: 5.03 MILLION/UL (ref 3.81–5.12)
SODIUM SERPL-SCNC: 139 MMOL/L (ref 135–147)
T WAVE AXIS: 64 DEGREES
T WAVE AXIS: 76 DEGREES
VENTRICULAR RATE: 74 BPM
VENTRICULAR RATE: 83 BPM
WBC # BLD AUTO: 9.01 THOUSAND/UL (ref 4.31–10.16)

## 2025-05-05 PROCEDURE — 93971 EXTREMITY STUDY: CPT

## 2025-05-05 PROCEDURE — 99214 OFFICE O/P EST MOD 30 MIN: CPT | Performed by: PHYSICIAN ASSISTANT

## 2025-05-05 PROCEDURE — 93010 ELECTROCARDIOGRAM REPORT: CPT | Performed by: INTERNAL MEDICINE

## 2025-05-05 PROCEDURE — 83880 ASSAY OF NATRIURETIC PEPTIDE: CPT

## 2025-05-05 PROCEDURE — 84484 ASSAY OF TROPONIN QUANT: CPT

## 2025-05-05 PROCEDURE — 71046 X-RAY EXAM CHEST 2 VIEWS: CPT

## 2025-05-05 PROCEDURE — 93005 ELECTROCARDIOGRAM TRACING: CPT | Performed by: PHYSICIAN ASSISTANT

## 2025-05-05 PROCEDURE — 85025 COMPLETE CBC W/AUTO DIFF WBC: CPT

## 2025-05-05 PROCEDURE — 36415 COLL VENOUS BLD VENIPUNCTURE: CPT

## 2025-05-05 PROCEDURE — 85379 FIBRIN DEGRADATION QUANT: CPT

## 2025-05-05 PROCEDURE — 93971 EXTREMITY STUDY: CPT | Performed by: SURGERY

## 2025-05-05 PROCEDURE — 80053 COMPREHEN METABOLIC PANEL: CPT

## 2025-05-05 PROCEDURE — 70498 CT ANGIOGRAPHY NECK: CPT

## 2025-05-05 PROCEDURE — 99285 EMERGENCY DEPT VISIT HI MDM: CPT

## 2025-05-05 PROCEDURE — 70496 CT ANGIOGRAPHY HEAD: CPT

## 2025-05-05 PROCEDURE — 93005 ELECTROCARDIOGRAM TRACING: CPT

## 2025-05-05 RX ADMIN — IOHEXOL 85 ML: 350 INJECTION, SOLUTION INTRAVENOUS at 16:20

## 2025-05-05 NOTE — DISCHARGE INSTRUCTIONS
Please follow up with neurologist as discussed.     Return to the ED with any new/concerning issues.

## 2025-05-05 NOTE — PROGRESS NOTES
[facility]        NAME: Theresa Bennett is a 69 y.o. female  : 1955    MRN: [id number]  DATE: May 5, 2025  TIME: 11:30 AM      Assessment and Plan     Left leg swelling [M79.89]  1. Left leg swelling  Transfer to other facility      2. Jaw pain  ECG 12 lead    Transfer to other facility          POC Testing Results    ECG -- Normal sinus rhythm, septal infarct age undetermined (unchanged since 2023)    Medical Decision Making Note:   Suggested patient have further workup for possible heart failure, DVT, other cardiac issue, etc.  Suggested patient seen in the ER or her primary care either today or tomorrow for further evaluation.  Will put transfer order in computer in case patient has to go to the ER    Patient Instructions   There are no Patient Instructions on file for this visit.     Follow up with primary care provider.   Go to ER if symptoms worsen.    Chief Complaint     Chief Complaint   Patient presents with    Edema     Pt c/o whole left side of body swelling started this morning. Also states right side of body is cold sometimes. Took no OTC meds.          History of Present Illness     Patient states that last night the left side of her body was cold last night. Then, this morning, she noticed her left leg was swollen and the left side of her jaw hurts as well. She has not taken anything OTC for symptoms. She denies chest pain and shortness of breath.         Review of Systems     Review of Systems   Constitutional:  Positive for chills. Negative for fatigue and fever.   HENT:  Negative for congestion, ear pain, postnasal drip, rhinorrhea, sinus pressure, sinus pain and sore throat.    Eyes:  Negative for pain and visual disturbance.   Respiratory:  Negative for cough, chest tightness and shortness of breath.    Cardiovascular:  Positive for leg swelling. Negative for chest pain and palpitations.   Gastrointestinal:  Negative for abdominal pain, diarrhea, nausea and vomiting.    Genitourinary:  Negative for dysuria and hematuria.   Musculoskeletal:  Positive for arthralgias. Negative for back pain and myalgias.   Skin:  Negative for rash.   Neurological:  Negative for dizziness, seizures, syncope, numbness and headaches.   All other systems reviewed and are negative.        Current Medications       Current Outpatient Medications:     albuterol (PROVENTIL HFA,VENTOLIN HFA) 90 mcg/act inhaler, inhale 2 puffs by mouth 4 times a day, Disp: , Rfl:     aspirin 81 mg chewable tablet, Chew 1 tablet (81 mg total) daily Please buy over the counter, Disp: 100 tablet, Rfl: 0    atorvastatin (LIPITOR) 40 mg tablet, Take 1 tablet (40 mg total) by mouth daily, Disp: 100 tablet, Rfl: 0    Continuous Glucose Sensor (FreeStyle Benjamín 3 Sensor) MISC, Use 1 each every 14 (fourteen) days, Disp: 6 each, Rfl: 3    Empagliflozin-metFORMIN HCl (Synjardy) 12.5-1000 MG TABS, Take 1 tablet by mouth 2 (two) times a day, Disp: 180 tablet, Rfl: 0    gabapentin (NEURONTIN) 100 mg capsule, Take 1 capsule (100 mg total) by mouth daily with breakfast, Disp: 100 capsule, Rfl: 0    gabapentin (Neurontin) 300 mg capsule, Take 1 capsule (300 mg total) by mouth daily at bedtime, Disp: 90 capsule, Rfl: 0    lifitegrast (Xiidra) 5 % op solution, Administer 1 drop to both eyes 2 (two) times a day PRN, Disp: , Rfl:     loratadine (CLARITIN) 10 mg tablet, Take 1 tablet (10 mg total) by mouth daily, Disp: 100 tablet, Rfl: 0    potassium chloride (Klor-Con M10) 10 mEq tablet, Take 1 tablet (10 mEq total) by mouth 2 (two) times a day, Disp: 200 tablet, Rfl: 0    Restasis 0.05 % ophthalmic emulsion, Administer 1 drop to both eyes daily as needed, Disp: , Rfl:     valsartan-hydrochlorothiazide (DIOVAN-HCT) 160-25 MG per tablet, Take 1 tablet by mouth daily, Disp: 100 tablet, Rfl: 0    Current Allergies     Allergies as of 05/05/2025    (No Known Allergies)              The following portions of the patient's history were reviewed and  updated as appropriate: allergies, current medications, past family history, past medical history, past social history, past surgical history, and problem list.     Past Medical History:   Diagnosis Date    Diabetes mellitus (HCC)     Hyperlipidemia     Hypertension        Past Surgical History:   Procedure Laterality Date     SECTION      HYSTERECTOMY      TONSILLECTOMY         History reviewed. No pertinent family history.      Medications have been verified.        Objective     /82   Pulse 87   Temp 98.3 °F (36.8 °C)   Resp 19   Wt 84.4 kg (186 lb)   SpO2 95%   BMI 35.14 kg/m²   No LMP recorded. Patient has had a hysterectomy.         Physical Exam     Physical Exam  Vitals and nursing note reviewed.   Constitutional:       Appearance: Normal appearance. She is normal weight.   HENT:      Head: Normocephalic and atraumatic.      Nose: Nose normal.      Mouth/Throat:      Mouth: Mucous membranes are moist.      Pharynx: Oropharynx is clear.   Cardiovascular:      Rate and Rhythm: Normal rate and regular rhythm.      Heart sounds: Normal heart sounds.   Pulmonary:      Effort: Pulmonary effort is normal.      Breath sounds: Normal breath sounds.   Musculoskeletal:      Right lower leg: No swelling. No edema.      Left lower le+ Pitting Edema present.   Skin:     General: Skin is warm and dry.   Neurological:      General: No focal deficit present.      Mental Status: She is alert and oriented to person, place, and time.   Psychiatric:         Mood and Affect: Mood normal.         Behavior: Behavior normal.

## 2025-05-06 ENCOUNTER — TELEPHONE (OUTPATIENT)
Dept: FAMILY MEDICINE CLINIC | Facility: CLINIC | Age: 70
End: 2025-05-06

## 2025-05-06 NOTE — ED PROVIDER NOTES
Time reflects when diagnosis was documented in both MDM as applicable and the Disposition within this note       Time User Action Codes Description Comment    5/5/2025  5:39 PM Jay Jay Mcadams [R20.2] Paresthesia           ED Disposition       ED Disposition   Discharge    Condition   Stable    Date/Time   Mon May 5, 2025  5:39 PM    Comment   Theresa Bennett discharge to home/self care.                   Assessment & Plan       Medical Decision Making  Patient is a 69-year-old female with past medical history of hypertension, HLD, DM, history of prior CVA, presenting to the ED for evaluation of perceived swelling in the left side of her face, left upper extremity, and left lower extremity.  Patient states that she woke up at 815 this morning, and felt as if the left side of her face and jaw were swollen, which extended to the left upper extremity, as well as the left lower extremity.  Patient adamantly denies any numbness and tingling in these areas, and denies any focal weakness of her left side.  She denies headache, neck pain, neck stiffness, acute trauma, prolonged immobility or shortness of breath.  She denies abdominal pain, nausea, vomiting, difficulty ambulating today.  Denies any difficulty speaking or swallowing.    On exam, there is no evidence of pitting edema or neurovascular deficit on the patient's left side.  Do not believe that patient's symptoms are representative of acute CVA.  Considering diagnosis of SVC syndrome, though this is unlikely with involvement of the left lower extremity and patient does not have any objective evidence of swelling, distended neck veins.  Will also consider electrolyte abnormalities, as well as DVT.    Labs ordered, including vascular studies of the left upper extremity and left lower extremity, as well as a CTA of the head and neck.  D-dimer screening was ordered to rule out PE, which was negative.  Also completed a cardiac workup given patient's complaint of  left sided jaw perceived swelling.    Workup reviewed.  EKG is nonischemic.  Troponin is negative.  CTA head and neck is negative for any acute intracranial or vascular pathology in the neck.  Patient's DVT studies of the left upper extremity and the left lower extremity are negative.  Again, there is no objective evidence of left-sided swelling.  Patient states that she has not followed up with her neurologist in quite some time.  I discussed that she should probably follow-up with her neurologist given these unilateral complaints.  No indication for admission at this time and patient remained neurologically intact without any acute deficits.  Patient is agreeable with the plan.    I reviewed all testing with the patient:   I gave oral return precautions for what to return for in addition to the written return precautions.   The patient (and any family present: ) verbalized understanding of the discharge instructions and warnings that would necessitate return to the Emergency Department.  I specifically highlighted areas of special concern regarding the written and verbal discharge instructions and return precautions.    All questions were answered prior to discharge.      Amount and/or Complexity of Data Reviewed  Labs: ordered. Decision-making details documented in ED Course.  Radiology: ordered.    Risk  Prescription drug management.        ED Course as of 05/06/25 0138   Mon May 05, 2025   1500 EKG: NSR at 83 bpm, RAD, normal intervals, no acute ST elevations or depressions as interpreted by me     1520 D-Dimer, Quant: <0.27  Reassuring against PE     1616 Per vascular tech, negative for DVT in the upper or lower extremity.   1657 Delta 2hr hsTnI: 0   1657 BNP: 24  WNL      1708 IMPRESSION:  CT Brain: No acute intracranial abnormality.     CTA head: Negative for large vessel intracranial occlusion. Stable moderate stenosis along the right cavernous ICA segment. Mid basilar fenestration, anatomical  variant. Absent left WILLARD A1 segment.     CTA neck: No extracranial carotid stenosis. The cervical vertebral arteries are patent.         Medications   iohexol (OMNIPAQUE) 350 MG/ML injection (MULTI-DOSE) 85 mL (85 mL Intravenous Given 25 1620)       ED Risk Strat Scores                    No data recorded        SBIRT 20yo+      Flowsheet Row Most Recent Value   Initial Alcohol Screen: US AUDIT-C     1. How often do you have a drink containing alcohol? 0 Filed at: 2025 1502   2. How many drinks containing alcohol do you have on a typical day you are drinking?  0 Filed at: 2025 1502   3a. Male UNDER 65: How often do you have five or more drinks on one occasion? 0 Filed at: 2025 1502   3b. FEMALE Any Age, or MALE 65+: How often do you have 4 or more drinks on one occassion? 0 Filed at: 2025 1502   Audit-C Score 0 Filed at: 2025 1502   JESSICA: How many times in the past year have you...    Used an illegal drug or used a prescription medication for non-medical reasons? Never Filed at: 2025 1502                            History of Present Illness       Chief Complaint   Patient presents with    Edema     States L sided swelling, and headache, was at  earlier today  denies SOB/CP       Past Medical History:   Diagnosis Date    Diabetes mellitus (HCC)     Hyperlipidemia     Hypertension       Past Surgical History:   Procedure Laterality Date     SECTION      HYSTERECTOMY      TONSILLECTOMY        History reviewed. No pertinent family history.   Social History     Tobacco Use    Smoking status: Never    Smokeless tobacco: Never   Vaping Use    Vaping status: Never Used   Substance Use Topics    Alcohol use: Not Currently    Drug use: Never      E-Cigarette/Vaping    E-Cigarette Use Never User       E-Cigarette/Vaping Substances    Nicotine No     THC No     CBD No     Flavoring No     Other No     Unknown No       I have reviewed and agree with the history as documented.      HPI    Patient is a 69-year-old female with past medical history of hypertension, HLD, DM, history of prior CVA, presenting to the ED for evaluation of perceived swelling in the left side of her face, left upper extremity, and left lower extremity.  Patient states that she woke up at 815 this morning, and felt as if the left side of her face and jaw were swollen, which extended to the left upper extremity, as well as the left lower extremity.  Patient adamantly denies any numbness and tingling in these areas, and denies any focal weakness of her left side.  She denies headache, neck pain, neck stiffness, acute trauma, prolonged immobility or shortness of breath.  She denies abdominal pain, nausea, vomiting, difficulty ambulating today.  Denies any difficulty speaking or swallowing.    Review of Systems   All other systems reviewed and are negative.          Objective       ED Triage Vitals [05/05/25 1353]   Temperature Pulse Blood Pressure Respirations SpO2 Patient Position - Orthostatic VS   98 °F (36.7 °C) 85 155/67 18 96 % --      Temp src Heart Rate Source BP Location FiO2 (%) Pain Score    -- -- -- -- --      Vitals      Date and Time Temp Pulse SpO2 Resp BP Pain Score FACES Pain Rating User   05/05/25 1353 98 °F (36.7 °C) 85 96 % 18 155/67 -- -- AM            Physical Exam  Vitals and nursing note reviewed.   Constitutional:       General: She is not in acute distress.     Appearance: Normal appearance. She is well-developed and normal weight. She is not ill-appearing, toxic-appearing or diaphoretic.   HENT:      Head: Normocephalic and atraumatic.      Comments: No facial swelling or tenderness. No facial rashes       Right Ear: External ear normal.      Left Ear: External ear normal.      Nose: Nose normal.      Mouth/Throat:      Mouth: Mucous membranes are moist.      Pharynx: Oropharynx is clear.   Eyes:      Extraocular Movements: Extraocular movements intact.      Conjunctiva/sclera: Conjunctivae  normal.      Pupils: Pupils are equal, round, and reactive to light.   Neck:      Vascular: No carotid bruit.      Comments: No soft tissue swelling    Cardiovascular:      Rate and Rhythm: Normal rate and regular rhythm.      Pulses: Normal pulses.      Heart sounds: Normal heart sounds. No murmur heard.     Comments: 2+ radial pulse bilaterally    Pulmonary:      Effort: Pulmonary effort is normal. No respiratory distress.      Breath sounds: Normal breath sounds. No wheezing, rhonchi or rales.   Abdominal:      General: Abdomen is flat.      Palpations: Abdomen is soft.      Tenderness: There is no abdominal tenderness. There is no guarding or rebound.   Musculoskeletal:         General: No swelling. Normal range of motion.      Cervical back: Normal range of motion and neck supple. No rigidity or tenderness.      Right lower leg: No edema.      Left lower leg: No edema.      Comments: No evidence of pitting edema in the extremities. Neurovascularly intact bilateral upper and lower extremities.     Lymphadenopathy:      Cervical: No cervical adenopathy.   Skin:     General: Skin is warm and dry.      Capillary Refill: Capillary refill takes less than 2 seconds.      Findings: No bruising, erythema or rash.   Neurological:      General: No focal deficit present.      Mental Status: She is alert and oriented to person, place, and time.      GCS: GCS eye subscore is 4. GCS verbal subscore is 5. GCS motor subscore is 6.      Cranial Nerves: Cranial nerves 2-12 are intact. No cranial nerve deficit, dysarthria or facial asymmetry.      Sensory: No sensory deficit.      Motor: No weakness or pronator drift.      Coordination: Coordination is intact. Coordination normal.      Gait: Gait normal.   Psychiatric:         Mood and Affect: Mood normal.         Results Reviewed       Procedure Component Value Units Date/Time    HS Troponin I 2hr [[id number]]  (Normal) Collected: 05/05/25 1601    Lab Status: Final result  Specimen: Blood from Arm, Right Updated: 05/05/25 1627     hs TnI 2hr 3 ng/L      Delta 2hr hsTnI 0 ng/L     D-dimer, quantitative [[id number]]  (Normal) Collected: 05/05/25 1401    Lab Status: Final result Specimen: Blood from Arm, Right Updated: 05/05/25 1515     D-Dimer, Quant <0.27 ug/ml FEU     Narrative:      In the evaluation for possible pulmonary embolism, in the appropriate (Well's Score of 4 or less) patient, the age adjusted d-dimer cutoff for this patient can be calculated as:    Age x 0.01 (in ug/mL) for Age-adjusted D-dimer exclusion threshold for a patient over 50 years.    B-Type Natriuretic Peptide(BNP) [[id number]]  (Normal) Collected: 05/05/25 1401    Lab Status: Final result Specimen: Blood from Arm, Right Updated: 05/05/25 1452     BNP 24 pg/mL     HS Troponin 0hr (reflex protocol) [[id number]]  (Normal) Collected: 05/05/25 1401    Lab Status: Final result Specimen: Blood from Arm, Right Updated: 05/05/25 1430     hs TnI 0hr 3 ng/L     Comprehensive metabolic panel [[id number]]  (Abnormal) Collected: 05/05/25 1401    Lab Status: Final result Specimen: Blood from Arm, Right Updated: 05/05/25 1422     Sodium 139 mmol/L      Potassium 3.7 mmol/L      Chloride 102 mmol/L      CO2 27 mmol/L      ANION GAP 10 mmol/L      BUN 14 mg/dL      Creatinine 0.82 mg/dL      Glucose 210 mg/dL      Calcium 9.5 mg/dL      AST 11 U/L      ALT 13 U/L      Alkaline Phosphatase 99 U/L      Total Protein 7.2 g/dL      Albumin 4.0 g/dL      Total Bilirubin 0.68 mg/dL      eGFR 73 ml/min/1.73sq m     Narrative:      [employer] guidelines for Chronic Kidney Disease (CKD):     Stage 1 with normal or high GFR (GFR > 90 mL/min/1.73 square meters)    Stage 2 Mild CKD (GFR = 60-89 mL/min/1.73 square meters)    Stage 3A Moderate CKD (GFR = 45-59 mL/min/1.73 square meters)    Stage 3B Moderate CKD (GFR = 30-44 mL/min/1.73 square meters)    Stage 4 Severe CKD (GFR = 15-29 mL/min/1.73 square meters)    Stage  5 End Stage CKD (GFR <15 mL/min/1.73 square meters)  Note: GFR calculation is accurate only with a steady state creatinine    CBC and differential [[id number]]  (Abnormal) Collected: 05/05/25 1401    Lab Status: Final result Specimen: Blood from Arm, Right Updated: 05/05/25 1406     WBC 9.01 Thousand/uL      RBC 5.03 Million/uL      Hemoglobin 13.0 g/dL      Hematocrit 41.6 %      MCV 83 fL      MCH 25.8 pg      MCHC 31.3 g/dL      RDW 15.0 %      MPV 9.6 fL      Platelets 303 Thousands/uL      nRBC 0 /100 WBCs      Segmented % 66 %      Immature Grans % 0 %      Lymphocytes % 25 %      Monocytes % 6 %      Eosinophils Relative 2 %      Basophils Relative 1 %      Absolute Neutrophils 5.97 Thousands/µL      Absolute Immature Grans 0.03 Thousand/uL      Absolute Lymphocytes 2.24 Thousands/µL      Absolute Monocytes 0.53 Thousand/µL      Eosinophils Absolute 0.15 Thousand/µL      Basophils Absolute 0.09 Thousands/µL             CTA head and neck with and without contrast   Final Interpretation by Prosper Carvajal MD (05/05 6714)   CT Brain: No acute intracranial abnormality.      CTA head: Negative for large vessel intracranial occlusion. Stable moderate stenosis along the right cavernous ICA segment. Mid basilar fenestration, anatomical variant. Absent left WILLARD A1 segment.      CTA neck: No extracranial carotid stenosis. The cervical vertebral arteries are patent.                     Workstation performed: [id number]         XR chest 2 views   Final Interpretation by Jay Jay Mays MD (05/05 4836)      No acute cardiopulmonary disease.            Workstation performed: [id number]         VAS upper limb venous duplex scan, unilateral/limited   Final Interpretation by Chi Delgado MD (05/05 3402)      VAS VENOUS DUPLEX -LOWER LIMB UNILATERAL   Final Interpretation by Chi Delgado MD (05/05 8582)          Procedures    ED Medication and Procedure Management   Prior to Admission  Medications   Prescriptions Last Dose Informant Patient Reported? Taking?   Continuous Glucose Sensor (FreeStyle Benjamín 3 Sensor) MISC  Self No No   Sig: Use 1 each every 14 (fourteen) days   Empagliflozin-metFORMIN HCl (Synjardy) 12.5-1000 MG TABS   No No   Sig: Take 1 tablet by mouth 2 (two) times a day   Restasis 0.05 % ophthalmic emulsion  Self Yes No   Sig: Administer 1 drop to both eyes daily as needed   albuterol (PROVENTIL HFA,VENTOLIN HFA) 90 mcg/act inhaler   Yes No   Sig: inhale 2 puffs by mouth 4 times a day   aspirin 81 mg chewable tablet   No No   Sig: Chew 1 tablet (81 mg total) daily Please buy over the counter   atorvastatin (LIPITOR) 40 mg tablet   No No   Sig: Take 1 tablet (40 mg total) by mouth daily   gabapentin (NEURONTIN) 100 mg capsule   No No   Sig: Take 1 capsule (100 mg total) by mouth daily with breakfast   gabapentin (Neurontin) 300 mg capsule   No No   Sig: Take 1 capsule (300 mg total) by mouth daily at bedtime   lifitegrast (Xiidra) 5 % op solution  Self Yes No   Sig: Administer 1 drop to both eyes 2 (two) times a day PRN   loratadine (CLARITIN) 10 mg tablet   No No   Sig: Take 1 tablet (10 mg total) by mouth daily   potassium chloride (Klor-Con M10) 10 mEq tablet   No No   Sig: Take 1 tablet (10 mEq total) by mouth 2 (two) times a day   valsartan-hydrochlorothiazide (DIOVAN-HCT) 160-25 MG per tablet   No No   Sig: Take 1 tablet by mouth daily      Facility-Administered Medications: None     Discharge Medication List as of 5/5/2025  5:39 PM        CONTINUE these medications which have NOT CHANGED    Details   albuterol (PROVENTIL HFA,VENTOLIN HFA) 90 mcg/act inhaler inhale 2 puffs by mouth 4 times a day, Historical Med      aspirin 81 mg chewable tablet Chew 1 tablet (81 mg total) daily Please buy over the counter, Starting Wed 4/23/2025, Until Fri 8/1/2025, Normal      atorvastatin (LIPITOR) 40 mg tablet Take 1 tablet (40 mg total) by mouth daily, Starting Wed 4/23/2025, Until Fri  8/1/2025, Normal      Continuous Glucose Sensor (FreeStyle Benjamín 3 Sensor) MISC Use 1 each every 14 (fourteen) days, Starting Mon 8/26/2024, Normal      Empagliflozin-metFORMIN HCl (Synjardy) 12.5-1000 MG TABS Take 1 tablet by mouth 2 (two) times a day, Starting Wed 4/23/2025, Until Tue 7/22/2025, Normal      !! gabapentin (NEURONTIN) 100 mg capsule Take 1 capsule (100 mg total) by mouth daily with breakfast, Starting Wed 4/23/2025, Until Fri 8/1/2025, Normal      !! gabapentin (Neurontin) 300 mg capsule Take 1 capsule (300 mg total) by mouth daily at bedtime, Starting Wed 4/23/2025, Until Tue 7/22/2025, Normal      lifitegrast (Xiidra) 5 % op solution Administer 1 drop to both eyes 2 (two) times a day PRN, Starting Mon 2/13/2023, Historical Med      loratadine (CLARITIN) 10 mg tablet Take 1 tablet (10 mg total) by mouth daily, Starting Wed 4/23/2025, Until Fri 8/1/2025, Normal      potassium chloride (Klor-Con M10) 10 mEq tablet Take 1 tablet (10 mEq total) by mouth 2 (two) times a day, Starting Wed 4/23/2025, Until Fri 8/1/2025, Normal      Restasis 0.05 % ophthalmic emulsion Administer 1 drop to both eyes daily as needed, Starting Mon 4/1/2024, Historical Med      valsartan-hydrochlorothiazide (DIOVAN-HCT) 160-25 MG per tablet Take 1 tablet by mouth daily, Starting Tue 1/21/2025, Until Thu 5/1/2025, Normal       !! - Potential duplicate medications found. Please discuss with provider.        No discharge procedures on file.  ED SEPSIS DOCUMENTATION   Time reflects when diagnosis was documented in both MDM as applicable and the Disposition within this note       Time User Action Codes Description Comment    5/5/2025  5:39 PM Jay Jay Mcadams Add [R20.2] Paresthesia                  Jay Jay Mcadams, DO  05/06/25 0138

## 2025-05-06 NOTE — TELEPHONE ENCOUNTER
Spoke w pt/offered earlier appt, 5/23/25 rescheduled for 5/9/2025. Pt very appreciative.     Vera Filipovska, CRNP  [facility]  See if follow up needed

## 2025-05-09 ENCOUNTER — OFFICE VISIT (OUTPATIENT)
Dept: FAMILY MEDICINE CLINIC | Facility: CLINIC | Age: 70
End: 2025-05-09
Payer: COMMERCIAL

## 2025-05-09 VITALS
HEART RATE: 82 BPM | TEMPERATURE: 97.6 F | WEIGHT: 183 LBS | OXYGEN SATURATION: 96 % | SYSTOLIC BLOOD PRESSURE: 110 MMHG | DIASTOLIC BLOOD PRESSURE: 58 MMHG | HEIGHT: 61 IN | BODY MASS INDEX: 34.55 KG/M2

## 2025-05-09 DIAGNOSIS — E11.9 DM TYPE 2, NOT AT GOAL (HCC): ICD-10-CM

## 2025-05-09 DIAGNOSIS — I63.50 RIGHT PONTINE STROKE (HCC): ICD-10-CM

## 2025-05-09 DIAGNOSIS — G57.92 NEUROPATHY OF LEFT LOWER EXTREMITY: Primary | ICD-10-CM

## 2025-05-09 DIAGNOSIS — I10 HYPERTENSION GOAL BP (BLOOD PRESSURE) < 130/80: ICD-10-CM

## 2025-05-09 DIAGNOSIS — Z86.73 HISTORY OF LACUNAR CEREBROVASCULAR ACCIDENT (CVA): ICD-10-CM

## 2025-05-09 DIAGNOSIS — Z12.11 SCREENING FOR COLON CANCER: ICD-10-CM

## 2025-05-09 PROCEDURE — 99214 OFFICE O/P EST MOD 30 MIN: CPT | Performed by: NURSE PRACTITIONER

## 2025-05-09 RX ORDER — GABAPENTIN 300 MG/1
300 CAPSULE ORAL
Start: 2025-05-09 | End: 2025-08-07

## 2025-05-09 NOTE — ASSESSMENT & PLAN NOTE
Diabetes much improved but still not at goal  Diabetes assessed for stability and discussed plan of care   Reviewed medications and changes as needed   Reviewed labs and medications   Diet and adequate hydration reviewed   We will continue 4 month follow up surveillance  Reviewed Podiatry follow up   Reviewed  DM eye exam    Lab Results   Component Value Date    HGBA1C 7.8 (A) 01/21/2025            
Hypertension has been normal below target  HTN assessed for stability and discussed plan of care   Estimated Creatinine Clearance: 63.3 mL/min (by C-G formula based on SCr of 0.82 mg/dL).   Reviewed labs   No changes   We will continue to monitor         
Under care of neurology  No major changes on the CTA did discuss that some of her sensations are related to the stroke       
(4) walks frequently

## 2025-05-09 NOTE — PROGRESS NOTES
Name: Theresa Bennett      : 1955      MRN: [id number]  Encounter Provider: JOCELIN Toscano  Encounter Date: 2025   Encounter department: [facility]  :  Assessment & Plan  Neuropathy of left lower extremity  Patient is a 69-year-old female presents in office for ER follow-up  Secondary to feeling of heaviness and swelling to the left lower extremity intermittent hot and cold feelings on the left side of her body.  Was sent to the ER for evaluation as she does have history of strokes.  Reviewed workup CTA and lower extremity vascular studies were all normal CTA head no worsening than prior  Blood work was done in the ER also reviewed without any major abnormalities  Discussed with patient that this could be neurological in nature secondary to neuropathy she has had multiple strokes and she has had left-sided affected -discussed that some of the sensations could be related to of normal function of the nerves on that side.   Gabapentin was increased to 300 mg at bedtime as during the day makes her sleepy  Discussed adding compression stockings to her left lower extremity especially now that she is working she is more active and she is moving muscles that she has not moved for prolonged period of time        Screening for colon cancer  Discussed follow-up with GI for colon cancer screening  Orders:    Ambulatory Referral to Gastroenterology; Future    Hypertension goal BP (blood pressure) < 130/80  Hypertension has been normal below target  HTN assessed for stability and discussed plan of care   Estimated Creatinine Clearance: 63.3 mL/min (by C-G formula based on SCr of 0.82 mg/dL).   Reviewed labs   No changes   We will continue to monitor         DM type 2, not at goal (HCC)  Diabetes much improved but still not at goal  Diabetes assessed for stability and discussed plan of care   Reviewed medications and changes as needed   Reviewed labs and medications   Diet and  adequate hydration reviewed   We will continue 4 month follow up surveillance  Reviewed Podiatry follow up   Reviewed  DM eye exam    Lab Results   Component Value Date    HGBA1C 7.8 (A) 01/21/2025            Small right pontine stroke with residual dysesthesias  Under care of neurology  No major changes on the CTA did discuss that some of her sensations are related to the stroke       History of lacunar cerebrovascular accident (CVA)  Continue follow-up with neurology  Increase the gabapentin to 300 mg at night  Orders:    gabapentin (Neurontin) 300 mg capsule; Take 1 capsule (300 mg total) by mouth daily at bedtime          Depression Screening and Follow-up Plan: Patient was screened for depression during today's encounter. They screened negative with a PHQ-2 score of 0.        History of Present Illness   Patient is a 69-year-old female presents in office for ER follow-up  Secondary to feeling of heaviness and swelling to the left lower extremity intermittent hot and cold feelings on the left side of her body.  Was sent to the ER for evaluation as she does have history of strokes.  Reviewed workup CTA and lower extremity vascular studies were all normal CTA head no worsening than prior  Blood work was done in the ER also reviewed without any major abnormalities  Discussed with patient that this could be neurological in nature secondary to neuropathy she has had multiple strokes and she has had left-sided affected -discussed that some of the sensations could be related to of normal function of the nerves on that side.         Review of Systems   Constitutional:  Positive for fatigue. Negative for fever and unexpected weight change.   HENT:  Negative for congestion, nosebleeds and sinus pressure.    Eyes: Negative.    Respiratory:  Negative for cough, shortness of breath and wheezing.    Cardiovascular:  Negative for chest pain and palpitations.        HTN   Carotid stenosis- monitored by vascular   "  Hyperlipidemia not at goal  She was not she has not been compliant with her medications daily     Gastrointestinal:  Negative for abdominal distention, abdominal pain, nausea and vomiting.   Endocrine:        Diabetes    Genitourinary:  Negative for difficulty urinating and flank pain.   Musculoskeletal:  Positive for myalgias. Negative for arthralgias and gait problem.        Gait abnormality  Balance issues and weakness  to left side   Did not get to see PT yet    Skin:  Negative for rash.   Allergic/Immunologic: Negative for environmental allergies.   Neurological:  Positive for weakness, light-headedness and numbness.        Here to discuss abnormal CTA - was seen by neurosurgery medical management - continues follow up with Neurology   Does have history of stroke    Left sided neuropathy and sensory abnormalities   Here for ER FOLLOW UP    Hematological:  Negative for adenopathy.   Psychiatric/Behavioral:  Negative for sleep disturbance and suicidal ideas. The patient is nervous/anxious (improved).        Objective   /58 (BP Location: Left arm, Patient Position: Sitting, Cuff Size: Large)   Pulse 82   Temp 97.6 °F (36.4 °C)   Ht 5' 1\" (1.549 m)   Wt 83 kg (183 lb)   SpO2 96%   BMI 34.58 kg/m²      Physical Exam  Vitals and nursing note reviewed.   Constitutional:       Appearance: Normal appearance.   HENT:      Head: Atraumatic.      Nose: No congestion or rhinorrhea.   Eyes:      Extraocular Movements: Extraocular movements intact.   Cardiovascular:      Rate and Rhythm: Normal rate and regular rhythm.      Pulses: Normal pulses.      Heart sounds: Normal heart sounds.   Pulmonary:      Effort: Pulmonary effort is normal.      Breath sounds: Normal breath sounds.   Abdominal:      General: Bowel sounds are normal.      Palpations: Abdomen is soft.   Musculoskeletal:      Cervical back: Normal range of motion.      Right lower leg: No edema.      Left lower leg: No edema.   Skin:     General: " Skin is warm.      Capillary Refill: Capillary refill takes less than 2 seconds.   Neurological:      Mental Status: She is alert and oriented to person, place, and time.      Sensory: Sensory deficit present.      Motor: Weakness present.      Coordination: Coordination abnormal.      Comments: Slight left sided weakness / neuropathy and sensory abnormalities   Seen by neurosurgery about abnormal Ct scan and was seen by neurology and vascular - no surgical interventions at this point    Psychiatric:         Mood and Affect: Mood normal.         Behavior: Behavior normal.         CTA NECK AND BRAIN WITH AND WITHOUT CONTRAST     INDICATION: patient with feeling of L sided facial swelling, L arm swelling, and L leg swelling; hx previous CVA. no objective swelling or paresthesia on exam; r/o CVA     COMPARISON:   CTA head and neck 9/17/2024     TECHNIQUE:  Routine CT imaging of the Brain without contrast.Post contrast imaging was performed after administration of iodinated contrast through the neck and brain. Post contrast axial 0.625 mm images timed to opacify the arterial system.  3D   rendering was performed on an independent workstation.   MIP reconstructions performed. Coronal and sagittal reconstructions were performed of the non contrast portion of the brain.     Radiation dose length product (DLP) for this visit:  1300 mGy-cm. .  This examination, like all CT scans performed in the [facility], was performed utilizing techniques to minimize radiation dose exposure, including the use of iterative   reconstruction and automated exposure control.     IV Contrast:  85 mL of iohexol     IMAGE QUALITY:   Diagnostic     FINDINGS:  NONCONTRAST BRAIN  PARENCHYMA:No intracranial mass, mass effect or midline shift. No CT signs of acute infarction.  No acute parenchymal hemorrhage.     VENTRICLES AND EXTRA-AXIAL SPACES:Normal for the patient's age.     VISUALIZED ORBITS: Normal.     PARANASAL SINUSES:  Normal.     CTA NECK     ARCH AND GREAT VESSELS: Visualized arch and great vessels are patent. The left common carotid and internal carotid arteries arise separately from the aortic arch.  VERTEBRAL ARTERIES: Patent extracranial segments.  RIGHT CAROTID: Motion and beam hardening artifacts limit evaluation of the bifurcation. No stenosis.    No dissection.  LEFT CAROTID: No stenosis.    No dissection.  NASCET criteria was used to determine the degree of internal carotid artery diameter stenosis.        CTA BRAIN:  INTERNAL CAROTID ARTERIES: Calcified atheromatous plaque along the bilateral cavernous and supraclinoid ICA segments. Stable moderate stenosis along the right cavernous ICA segment.  ANTERIOR CEREBRAL ARTERY CIRCULATION:  No stenosis or occlusion. Absent left WILLARD A1 segment, anatomical variant.  MIDDLE CEREBRAL ARTERY CIRCULATION:  No stenosis or occlusion.  DISTAL VERTEBRAL ARTERIES:  No stenosis or occlusion. The PICA branches are patent.  BASILAR ARTERY:  No stenosis or occlusion. Mid basilar fenestration, anatomical variant.  POSTERIOR CEREBRAL ARTERIES: No stenosis or occlusion.  VENOUS STRUCTURES:  Normal.     NON VASCULAR ANATOMY  BONY STRUCTURES: Multilevel cervical degenerative disc disease with diffuse disc osteophyte complexes at the C4-5 through the C7-T1 levels. Multilevel facet arthrosis     SOFT TISSUES OF THE NECK: Heterogeneity of the right thyroid lobe. Absence of the left thyroid lobe.     THORACIC INLET:  Unremarkable.        IMPRESSION:  CT Brain: No acute intracranial abnormality.     CTA head: Negative for large vessel intracranial occlusion. Stable moderate stenosis along the right cavernous ICA segment. Mid basilar fenestration, anatomical variant. Absent left WILLARD A1 segment.     CTA neck: No extracranial carotid stenosis. The cervical vertebral arteries are patent.                    Workstation performed: [id number]     Study Result    Narrative & Impression   XR CHEST PA AND  LATERAL     INDICATION: Chest Pain.     COMPARISON: None     FINDINGS:     No focal consolidation. No pneumothorax or pleural effusion.     Normal cardiomediastinal silhouette.     Bones are unremarkable for age.     Normal upper abdomen.     IMPRESSION:     No acute cardiopulmonary disease.           Workstation performed: [id number]        Imaging    XR chest 2 views (Order: [id number]) - 5/5/2025    VAS upper limb venous duplex scan, unilateral/limited  Status: Final result     PACS Images - GE     Show images for VAS upper limb venous duplex scan, unilateral/limited  PACS Images - Sectra     Show images for VAS upper limb venous duplex scan, unilateral/limited  Study Result    Result Text   [facility] REPORT  ____________  DORI BECK is a 69 year old F who was referred by LUIS FERNANDO GA.        Indications:   Patient presents with left lower extremity edema x 1-2 days.   Operative History:   no prior cardiovascular surgeries      Risk Factors:   The patient has history of hypertension, diabetes and PAD.           CONCLUSION:     RIGHT UPPER LIMB LIMITED:   Evaluation shows no evidence of thrombus in the internal jugular vein, subclavian vein, and the innominate vein.       LEFT UPPER LIMB:   No evidence of acute or chronic deep vein thrombosis.    No evidence of superficial thrombophlebitis noted.   Doppler evaluation shows a normal response to augmentation maneuvers.      Technical findings were given to Luis Fernando Velazquez.         Study Result    Result Text   [facility] REPORT  .  DORI BECK is a 69 year old F who was referred by LUIS FERNANDO GA.        Indications:   Patient presents with left lower extremity edema x 1-2 days.   Operative History:   no prior cardiovascular surgeries      Risk Factors:   The patient has history of hypertension, diabetes and PAD.           CONCLUSION:     RIGHT LOWER LIMB LIMITED:   Evaluation shows no evidence of thrombus in the common femoral vein.     Doppler evaluation shows a normal response to augmentation maneuvers.        LEFT LOWER LIMB:   No evidence of acute or chronic deep vein thrombosis    No evidence of superficial thrombophlebitis noted.   Doppler evaluation shows a normal response to augmentation maneuvers.   Popliteal, posterior tibial and anterior tibial arterial Doppler waveform's are biphasic.       Technical findings were given to Jay Jay Velazquez      Component  Ref Range & Units (hover) 5/5/25  2:01 PM 4/22/25  7:18 AM 8/22/24 11:18 AM 12/9/23  4:49 AM 12/8/23 10:06 AM   WBC 9.01 6.00 7.07 7.03 6.82   RBC 5.03 5.17 High  4.98 4.83 5.52 High    Hemoglobin 13.0 13.4 12.9 12.4 14.0   Hematocrit 41.6 44.4 43.8 39.9 45.8   MCV 83 86 88 83 83   MCH 25.8 Low  25.9 Low  25.9 Low  25.7 Low  25.4 Low    MCHC 31.3 Low  30.2 Low  29.5 Low  31.1 Low  30.6 Low    RDW 15.0 15.6 High  14.7 14.1 14.1   MPV 9.6 9.9 10.1 9.7 9.8   Platelets 303 299 323 297 353   nRBC 0 0 0 0    Segmented % 66 50 58 55    Immature Grans % 0 0 0 0    Lymphocytes % 25 36 31 34    Monocytes % 6 10 8 7    Eosinophils Relative 2 3 2 3    Basophils Relative 1 1 1 1    Absolute Neutrophils 5.97 2.98 4.07 3.85    Absolute Immature Grans 0.03 0.02 0.02 0.02    Absolute Lymphocytes 2.24 2.18 2.20 2.39    Absolute Monocytes 0.53 0.61 0.58 0.51    Eosinophils Absolute 0.15 0.16 0.14 0.19    Basophils Absolute 0.09 0.05 0.06 0.07              Specimen Collected: 05/05/25  2:01 PM     Component  Ref Range & Units (hover) 5/5/25  2:01 PM 4/22/25  7:18 AM 1/20/25 10:08 AM 8/22/24 11:18 AM 12/9/23  4:49 AM 12/8/23 10:06 AM 2/13/23  8:22 AM   Sodium 139 140 140 141 137 139 139 R   Potassium 3.7 3.2 Low  3.7 3.2 Low  3.7 4.3 4.0 R   Chloride 102 99 98 99 105 104 98 R   CO2 27 28 30 32 24 26 28 R   ANION GAP 10 13 12 10 8 R 9 R 13 R   BUN 14 16  11 16 11 9 R   Creatinine 0.82 0.94 CM  0.80 CM 0.78 CM 0.83 CM 0.9 R   Comment: Standardized to IDMS reference method   Glucose 210 High     164 High  CM  184 High   High  R   Comment: If the patient is fasting, the ADA then defines impaired fasting glucose as > 100 mg/dL and diabetes as > or equal to 123 mg/dL.   Calcium 9.5 9.8  9.5 8.9 9.4 9.9   AST 11 Low  20     13 R   ALT 13 27 CM     14 R   Comment: Specimen collection should occur prior to Sulfasalazine administration due to the potential for falsely depressed results.   Alkaline Phosphatase 99 102     94 R   Total Protein 7.2 7.1     7.0 R   Albumin 4.0 4.1     4.7 R   Total Bilirubin 0.68 0.74 CM     0.6 R   Comment: Use of this assay is not recommended for patients undergoing treatment with eltrombopag due to the potential for falsely elevated results.  N-acetyl-p-benzoquinone imine (metabolite of Acetaminophen) will generate erroneously low results in samples for patients that have taken an overdose of Acetaminophen.   eGFR 73 62  75 78 72      Administrative Statements   I have spent a total time of 45  minutes in caring for this patient on the day of the visit/encounter including Diagnostic results, Prognosis, Risks and benefits of tx options, Instructions for management, Patient and family education, Importance of tx compliance, Risk factor reductions, Impressions, Counseling / Coordination of care, Documenting in the medical record, Reviewing/placing orders in the medical record (including tests, medications, and/or procedures), Obtaining or reviewing history  , Communicating with other healthcare professionals , and ER FOLLOW UP AND WORK UP REVIEWED  .

## 2025-06-22 DIAGNOSIS — I63.50 RIGHT PONTINE STROKE (HCC): ICD-10-CM

## 2025-06-22 DIAGNOSIS — G62.9 NEUROPATHY: ICD-10-CM

## 2025-06-23 RX ORDER — GABAPENTIN 100 MG/1
100 CAPSULE ORAL
Qty: 30 CAPSULE | Refills: 2 | OUTPATIENT
Start: 2025-06-23

## 2025-07-19 DIAGNOSIS — Z86.73 HISTORY OF LACUNAR CEREBROVASCULAR ACCIDENT (CVA): ICD-10-CM

## 2025-07-21 RX ORDER — GABAPENTIN 300 MG/1
300 CAPSULE ORAL
Qty: 90 CAPSULE | Refills: 1 | Status: SHIPPED | OUTPATIENT
Start: 2025-07-21